# Patient Record
Sex: FEMALE | Race: WHITE | Employment: OTHER | URBAN - METROPOLITAN AREA
[De-identification: names, ages, dates, MRNs, and addresses within clinical notes are randomized per-mention and may not be internally consistent; named-entity substitution may affect disease eponyms.]

---

## 2021-02-22 RX ORDER — MULTIVIT-MIN/IRON/FOLIC ACID/K 18-600-40
CAPSULE ORAL
COMMUNITY

## 2021-02-22 RX ORDER — SERTRALINE HYDROCHLORIDE 100 MG/1
100 TABLET, FILM COATED ORAL DAILY
COMMUNITY

## 2021-02-22 RX ORDER — OXYBUTYNIN CHLORIDE 5 MG/1
5 TABLET ORAL NIGHTLY
COMMUNITY

## 2021-02-22 RX ORDER — ALPRAZOLAM 0.25 MG/1
0.25 TABLET ORAL NIGHTLY PRN
COMMUNITY

## 2021-02-22 RX ORDER — ATORVASTATIN CALCIUM 40 MG/1
40 TABLET, FILM COATED ORAL DAILY
COMMUNITY

## 2021-02-22 RX ORDER — BENAZEPRIL HYDROCHLORIDE 40 MG/1
40 TABLET, FILM COATED ORAL DAILY
COMMUNITY

## 2021-02-22 RX ORDER — BUPROPION HYDROCHLORIDE 300 MG/1
300 TABLET ORAL EVERY MORNING
COMMUNITY

## 2021-02-22 RX ORDER — AMITRIPTYLINE HYDROCHLORIDE 25 MG/1
25 TABLET, FILM COATED ORAL NIGHTLY
COMMUNITY

## 2021-02-22 RX ORDER — LAMOTRIGINE 25 MG/1
25 TABLET ORAL 2 TIMES DAILY
COMMUNITY

## 2021-02-22 RX ORDER — LANSOPRAZOLE 30 MG/1
30 CAPSULE, DELAYED RELEASE ORAL DAILY
COMMUNITY

## 2021-02-22 RX ORDER — TOPIRAMATE 25 MG/1
25 TABLET ORAL 2 TIMES DAILY
COMMUNITY

## 2021-02-22 RX ORDER — WARFARIN SODIUM 5 MG/1
5 TABLET ORAL
COMMUNITY

## 2021-02-22 NOTE — PROGRESS NOTES
[address] [facility] time___0720_________        Surgery time____0850________    Take the following medications with a sip of water: Benazepril, Prevacid     Do not eat or drink anything after 12:00 midnight prior to your surgery. This includes water chewing gum, mints and ice chips. You may brush your teeth and gargle the morning of your surgery, but do not swallow the water     Please see your family doctor/pediatrician for a history and physical and/or concerning medications. H&P Dr. Malorie Calero 2/25   Bring any test results/reports from your physicians office. If you are under the care of a heart doctor or specialist doctor, please be aware that you may be asked to them for clearance    You may be asked to stop blood thinners such as Coumadin, Plavix, Fragmin, Lovenox, etc., or any anti-inflammatories such as:  Aspirin, Ibuprofen, Advil, Naproxen prior to your surgery. We also ask that you stop any OTC medications such as fish oil, vitamin E, glucosamine, garlic, Multivitamins, COQ 10, etc.    We ask that you do not smoke 24 hours prior to surgery  We ask that you do not  drink any alcoholic beverages 24 hours prior to surgery     You must make arrangements for a responsible adult to take you home after your surgery. For your safety you will not be allowed to leave alone or drive yourself home. Your surgery will be cancelled if you do not have a ride home. Also for your safety, it is strongly suggested that someone stay with you the first 24 hours after your surgery. A parent or legal guardian must accompany a child scheduled for surgery and plan to stay at the hospital until the child is discharged. Please do not bring other children with you. For your comfort, please wear simple loose fitting clothing to the hospital.  Please do not bring valuables. Wear short sleeve button down shirt or loose fitting shirt. Bring eye drops or ointment. Do not wear any make-up or nail polish on your fingers or toes      For your safety, please do not wear any jewelry or body piercing's on the day of surgery. All jewelry must be removed. If you have dentures, they will be removed before going to operating room. For your convenience, we will provide you with a container. If you wear contact lenses or glasses, they will be removed, please bring a case for them. If you have a living will and a durable power of  for healthcare, please bring in a copy. As part of our patient safety program to minimize surgical site infections, we ask you to do the following:    · Please notify your surgeon if you develop any illness between         now and the  day of your surgery. · This includes a cough, cold, fever, sore throat, nausea,         or vomiting, and diarrhea, etc.  ·  Please notify your surgeon if you experience dizziness, shortness         of breath or blurred vision between now and the time of your surgery. Do not shave your operative site 96 hours prior to surgery. For face and neck surgery, men may use an electric razor 48 hours   prior to surgery. You may shower the night before surgery or the morning of   your surgery with an antibacterial soap. You will need to bring a photo ID and insurance card    [facility] has an onsite pharmacy, would you like to utilize our pharmacy     If you will be staying overnight and use a C-pap machine, please bring   your C-pap to hospital     Our goal is to provide you with excellent care, therefore, visitors will be limited to two(2) in the room at a time so that we may focus on providing this care for you. Please contact pre-admission testing if you have any further questions. [facility] phone number:  [phone]    Please note these are generalized instructions for all surgical cases, you may be provided with more specific instructions according to your surgery.

## 2021-02-26 ENCOUNTER — ANESTHESIA EVENT (OUTPATIENT)
Dept: SURGERY | Age: 76
End: 2021-02-26
Payer: MEDICARE

## 2021-02-26 NOTE — PROGRESS NOTES
Dr. Clarance Canavan aware of INR 4.5 and PT 42.6 states surgery should be okay as long as patient stops coumadin today. I spoke to the patient she has stopped the coumadin today.

## 2021-02-26 NOTE — PROGRESS NOTES
After reviewing patient's history  states it is okay for patient to have surgery at the surgery center. Dr. Steve Bernal made aware of INR 4.5 and protime 42.6 results.

## 2021-03-01 ENCOUNTER — ANESTHESIA (OUTPATIENT)
Dept: SURGERY | Age: 76
End: 2021-03-01
Payer: MEDICARE

## 2021-03-01 ENCOUNTER — HOSPITAL ENCOUNTER (OUTPATIENT)
Age: 76
Setting detail: OUTPATIENT SURGERY
Discharge: HOME | End: 2021-03-01
Attending: OPHTHALMOLOGY | Admitting: OPHTHALMOLOGY
Payer: MEDICARE

## 2021-03-01 VITALS — DIASTOLIC BLOOD PRESSURE: 64 MMHG | OXYGEN SATURATION: 93 % | SYSTOLIC BLOOD PRESSURE: 112 MMHG

## 2021-03-01 VITALS
WEIGHT: 178 LBS | DIASTOLIC BLOOD PRESSURE: 65 MMHG | SYSTOLIC BLOOD PRESSURE: 137 MMHG | RESPIRATION RATE: 19 BRPM | HEART RATE: 78 BPM | OXYGEN SATURATION: 98 % | TEMPERATURE: 96.4 F | HEIGHT: 67 IN | BODY MASS INDEX: 27.94 KG/M2

## 2021-03-01 LAB
GLUCOSE BLD-MCNC: 109 MG/DL (ref 70–99)
GLUCOSE BLD-MCNC: 128 MG/DL (ref 70–99)
PERFORMED ON: ABNORMAL
PERFORMED ON: ABNORMAL

## 2021-03-01 PROCEDURE — 3700000001 HC ADD 15 MINUTES (ANESTHESIA): Performed by: OPHTHALMOLOGY

## 2021-03-01 PROCEDURE — 2500000003 HC RX 250 WO HCPCS: Performed by: OPHTHALMOLOGY

## 2021-03-01 PROCEDURE — 6370000000 HC RX 637 (ALT 250 FOR IP): Performed by: OPHTHALMOLOGY

## 2021-03-01 PROCEDURE — 3700000000 HC ANESTHESIA ATTENDED CARE: Performed by: OPHTHALMOLOGY

## 2021-03-01 PROCEDURE — 3600000002 HC SURGERY LEVEL 2 BASE: Performed by: OPHTHALMOLOGY

## 2021-03-01 PROCEDURE — 2580000003 HC RX 258: Performed by: ANESTHESIOLOGY

## 2021-03-01 PROCEDURE — 3600000012 HC SURGERY LEVEL 2 ADDTL 15MIN: Performed by: OPHTHALMOLOGY

## 2021-03-01 PROCEDURE — 2709999900 HC NON-CHARGEABLE SUPPLY: Performed by: OPHTHALMOLOGY

## 2021-03-01 PROCEDURE — 6360000002 HC RX W HCPCS: Performed by: NURSE ANESTHETIST, CERTIFIED REGISTERED

## 2021-03-01 PROCEDURE — 7100000010 HC PHASE II RECOVERY - FIRST 15 MIN: Performed by: OPHTHALMOLOGY

## 2021-03-01 RX ORDER — SODIUM CHLORIDE 9 MG/ML
INJECTION, SOLUTION INTRAVENOUS CONTINUOUS
Status: CANCELLED | OUTPATIENT
Start: 2021-03-01

## 2021-03-01 RX ORDER — PROPOFOL 10 MG/ML
INJECTION, EMULSION INTRAVENOUS PRN
Status: DISCONTINUED | OUTPATIENT
Start: 2021-03-01 | End: 2021-03-01 | Stop reason: SDUPTHER

## 2021-03-01 RX ORDER — SODIUM CHLORIDE 0.9 % (FLUSH) 0.9 %
10 SYRINGE (ML) INJECTION EVERY 12 HOURS SCHEDULED
Status: CANCELLED | OUTPATIENT
Start: 2021-03-01

## 2021-03-01 RX ORDER — MIDAZOLAM HYDROCHLORIDE 1 MG/ML
INJECTION INTRAMUSCULAR; INTRAVENOUS PRN
Status: DISCONTINUED | OUTPATIENT
Start: 2021-03-01 | End: 2021-03-01 | Stop reason: SDUPTHER

## 2021-03-01 RX ORDER — SODIUM CHLORIDE 9 MG/ML
INJECTION, SOLUTION INTRAVENOUS CONTINUOUS
Status: DISCONTINUED | OUTPATIENT
Start: 2021-03-01 | End: 2021-03-01 | Stop reason: HOSPADM

## 2021-03-01 RX ORDER — SODIUM CHLORIDE 0.9 % (FLUSH) 0.9 %
10 SYRINGE (ML) INJECTION EVERY 12 HOURS SCHEDULED
Status: DISCONTINUED | OUTPATIENT
Start: 2021-03-01 | End: 2021-03-01 | Stop reason: HOSPADM

## 2021-03-01 RX ORDER — SODIUM CHLORIDE 0.9 % (FLUSH) 0.9 %
10 SYRINGE (ML) INJECTION PRN
Status: CANCELLED | OUTPATIENT
Start: 2021-03-01

## 2021-03-01 RX ORDER — TETRACAINE HYDROCHLORIDE 5 MG/ML
SOLUTION OPHTHALMIC
Status: COMPLETED | OUTPATIENT
Start: 2021-03-01 | End: 2021-03-01

## 2021-03-01 RX ORDER — SODIUM CHLORIDE 0.9 % (FLUSH) 0.9 %
10 SYRINGE (ML) INJECTION PRN
Status: DISCONTINUED | OUTPATIENT
Start: 2021-03-01 | End: 2021-03-01 | Stop reason: HOSPADM

## 2021-03-01 RX ADMIN — SODIUM CHLORIDE: 9 INJECTION, SOLUTION INTRAVENOUS at 07:48

## 2021-03-01 RX ADMIN — PROPOFOL 60 MG: 10 INJECTION, EMULSION INTRAVENOUS at 08:50

## 2021-03-01 RX ADMIN — PROPOFOL 20 MG: 10 INJECTION, EMULSION INTRAVENOUS at 09:11

## 2021-03-01 RX ADMIN — MIDAZOLAM 1 MG: 1 INJECTION INTRAMUSCULAR; INTRAVENOUS at 08:46

## 2021-03-01 RX ADMIN — PROPOFOL 20 MG: 10 INJECTION, EMULSION INTRAVENOUS at 09:06

## 2021-03-01 RX ADMIN — PROPOFOL 20 MG: 10 INJECTION, EMULSION INTRAVENOUS at 08:52

## 2021-03-01 RX ADMIN — PROPOFOL 40 MG: 10 INJECTION, EMULSION INTRAVENOUS at 09:01

## 2021-03-01 RX ADMIN — MIDAZOLAM 1 MG: 1 INJECTION INTRAMUSCULAR; INTRAVENOUS at 08:53

## 2021-03-01 ASSESSMENT — PAIN SCALES - GENERAL: PAINLEVEL_OUTOF10: 0

## 2021-03-01 ASSESSMENT — LIFESTYLE VARIABLES: SMOKING_STATUS: 0

## 2021-03-01 NOTE — ANESTHESIA POSTPROCEDURE EVALUATION
Department of Anesthesiology  Postprocedure Note    Patient: Mark John  MRN: [id number]  [place]: 1945  Date of evaluation: 3/1/2021  Time:  10:49 AM     Procedure Summary     Date: 03/01/21 Room / Location: [facility]    Anesthesia Start: 7211 Anesthesia Stop: 6622    Procedure: BILATERAL UPPER LID BLEPHAROPLASTY (Bilateral Eye) Diagnosis:       Dermatochalasis of both upper eyelids      (Dermatochalasis of both upper eyelids)    Surgeons: Ricky Dorado MD Responsible Provider: Jody Matt MD    Anesthesia Type: MAC ASA Status: 3          Anesthesia Type: MAC    Gretchen Phase I: Gretchen Score: 10    Gretchen Phase II: Gretchen Score: 10    Last vitals: Reviewed and per EMR flowsheets.        Anesthesia Post Evaluation    Patient location during evaluation: PACU  Patient participation: complete - patient participated  Level of consciousness: awake and alert  Pain score: 0  Airway patency: patent  Nausea & Vomiting: no nausea and no vomiting  Complications: no  Cardiovascular status: blood pressure returned to baseline  Respiratory status: acceptable  Hydration status: euvolemic

## 2021-03-01 NOTE — ANESTHESIA PRE PROCEDURE
[facility] Department of Anesthesiology  Pre-Anesthesia Evaluation/Consultation       Name:  Paula Lacey  :   Age:  77 y.o. MRN:  [id number]  Date: 3/1/2021           Surgeon: Surgeon(s):  Dot Red MD    Procedure: Procedure(s):  BILATERAL UPPER LID BLEPHAROPLASTY     Allergies   Allergen Reactions    Cymbalta [Duloxetine Hcl] Nausea Only    Escitalopram      Acts goofy    Fioricet [Butalbital-Apap-Caffeine]     Fluoxetine     Hydrochlorothiazide      sweat    Hydrocodone      goofy    Iron      Upset stomach    Lyrica [Pregabalin]      goofy    Metoprolol Nausea Only    Tramadol      Confusion     Valacyclovir      fever    Wasp Venom Swelling    Abilify [Aripiprazole] Nausea And Vomiting    Neosporin Original [Bacitracin-Neomycin-Polymyxin] Rash     There is no problem list on file for this patient. Past Medical History:   Diagnosis Date    Bell's palsy     CKD (chronic kidney disease)     Claustrophobia     Diabetes mellitus (HCC)     Esophageal reflux     Hx of blood clots     X 14    Hyperlipidemia     Hypertension     Kidney stone     Nervous     Pulmonary embolism ([facility] [address]     Stuttering      Past Surgical History:   Procedure Laterality Date    APPENDECTOMY      CARDIAC CATHETERIZATION      CHOLECYSTECTOMY      COLONOSCOPY      HYSTERECTOMY      KNEE SURGERY Right     TONSILLECTOMY       Social History     Tobacco Use    Smoking status: Former Smoker    Smokeless tobacco: Never Used    Tobacco comment: 10 yrs ago quit   Substance Use Topics    Alcohol use: Yes    Drug use: Never     Medications  No current facility-administered medications on file prior to encounter. Current Outpatient Medications on File Prior to Encounter   Medication Sig Dispense Refill    ALPRAZolam (XANAX) 0.25 MG tablet Take 0.25 mg by mouth nightly as needed for Sleep.  buPROPion (WELLBUTRIN XL) 300 MG extended release tablet Take 300 mg by mouth every morning      lamoTRIgine (LAMICTAL) 25 MG tablet Take 25 mg by mouth 2 times daily      sertraline (ZOLOFT) 100 MG tablet Take 100 mg by mouth daily      atorvastatin (LIPITOR) 40 MG tablet Take 40 mg by mouth daily      lansoprazole (PREVACID) 30 MG delayed release capsule Take 30 mg by mouth daily      amitriptyline (ELAVIL) 25 MG tablet Take 25 mg by mouth nightly Indications: 2 tablets at bedtime      topiramate (TOPAMAX) 25 MG tablet Take 25 mg by mouth 2 times daily      warfarin (COUMADIN) 5 MG tablet Take 5 mg by mouth Indications: 1.t tablets  5 days a week 2 tablets 2 days a week per INR      benazepril (LOTENSIN) 40 MG tablet Take 40 mg by mouth daily      oxybutynin (DITROPAN) 5 MG tablet Take 5 mg by mouth nightly      Cholecalciferol (VITAMIN D) 50 MCG ( UT) CAPS capsule Take by mouth       Current Facility-Administered Medications   Medication Dose Route Frequency Provider Last Rate Last Admin    0.9 % sodium chloride infusion   Intravenous Continuous Gerardo Pacheco MD        sodium chloride flush 0.9 % injection 10 mL  10 mL Intravenous 2 times per day Gerardo Pacheco MD        sodium chloride flush 0.9 % injection 10 mL  10 mL Intravenous PRN Gerardo Pacheco MD         Vital Signs (Current)   Vitals:    21 1528 21   BP:  (!) 150/68   Pulse:  80   Resp:  17   Temp:  98.3 °F (36.8 °C)   TempSrc:  Temporal   SpO2:  97%   Weight: 178 lb (80.7 kg) 178 lb (80.7 kg)   Height: 5' 7\" (1.702 m) 5' 7\" (1.702 m)                                          BP Readings from Last 3 Encounters:   21 (!) 150/68     Vital Signs Statistics (for past 48 hrs)     Temp  Av.3 °F (36.8 °C)  Min: 98.3 °F (36.8 °C)   Min taken time: 21  Max: 98.3 °F (36.8 °C)   Max taken time: 21  Pulse  Av  Min: 80   Min taken time: 21  Max: 80   Max taken time: 21 0078 Resp  Av  Min: 16   Min taken time: 21  Max: 16   Max taken time: 21  BP  Min: 150/68   Min taken time: 21  Max: 150/68   Max taken time: 21  SpO2  Av %  Min: 97 %   Min taken time: 21  Max: 97 %   Max taken time: 21  BP Readings from Last 3 Encounters:   21 (!) 150/68       BMI  Body mass index is 27.88 kg/m². Estimated body mass index is 27.88 kg/m² as calculated from the following:    Height as of this encounter: 5' 7\" (1.702 m). Weight as of this encounter: 178 lb (80.7 kg). CBC   Lab Results   Component Value Date    WBC 9.5 2010    RBC 4.75 2010    HGB 14.4 2010    HCT 43.0 2010    MCV 90.4 2010    RDW 14.2 2010     2010     CMP    Lab Results   Component Value Date     2010    K 4.3 2010     2010    CO2 29 2010    BUN 18 2010    CREATININE 0.9 2010    GFRAA >60 2010    AGRATIO 1.6 2010    GLUCOSE 114 2010    PROT 7.1 2010    CALCIUM 9.5 2010    BILITOT 0.50 2010    ALKPHOS 120 2010    AST 29 2010    ALT 39 2010     BMP    Lab Results   Component Value Date     2010    K 4.3 2010     2010    CO2 29 2010    BUN 18 2010    CREATININE 0.9 2010    CALCIUM 9.5 2010    GFRAA >60 2010    GLUCOSE 114 2010     POCGlucose  No results for input(s): GLUCOSE in the last 72 hours.    Coags    Lab Results   Component Value Date    PROTIME 21.7 2011    INR 1.94 2011    APTT 33.0      HCG (If Applicable) No results found for: PREGTESTUR, PREGSERUM, HCG, HCGQUANT   ABGs No results found for: PHART, PO2ART, DDC1KIO, LHQ1DYM, BEART, W0IWTSLP   Type & Screen (If Applicable)  No results found for: LABABO, LABRH                         BMI: Wt Readings from Last 3 Encounters:       NPO Status:  >8h Anesthesia Evaluation  Patient summary reviewed no history of anesthetic complications:   Airway: Mallampati: II  TM distance: >3 FB   Neck ROM: full  Mouth opening: > = 3 FB Dental:    (+) upper dentures      Pulmonary: breath sounds clear to auscultation      (-) COPD, asthma and not a current smoker                           Cardiovascular:  Exercise tolerance: good (>4 METS),   (+) hypertension:, hyperlipidemia    (-) past MI, CABG/stent and  angina        Rate: normal                    Neuro/Psych:   (+) neuromuscular disease:,    (-) seizures and psychiatric history           GI/Hepatic/Renal:   (+) GERD:, renal disease: CRI,      (-) liver disease and no morbid obesity       Endo/Other:    (+) DiabetesType II DM, , blood dyscrasia::., .    (-) hypothyroidism               Abdominal:           Vascular:   + DVT, PE.  - PVD. Anesthesia Plan      MAC     ASA 3     (Stopped coumadin 3 days ago per surgeon guidelines)  Induction: intravenous. Anesthetic plan and risks discussed with patient. Plan discussed with CRNA. This pre-anesthesia assessment may be used as a history and physical.    DOS STAFF ADDENDUM:    Pt seen and examined, chart reviewed (including anesthesia, drug and allergy history). No interval changes to history and physical examination. Anesthetic plan, risks, benefits, alternatives, and personnel involved discussed with patient. Patient verbalized an understanding and agrees to proceed.       Ludy Watson MD  March 1, 2021  7:43 AM

## 2021-03-01 NOTE — OP NOTE
Title of Operation:  Bilateral upper lid functional blepharoplasty, CPT code 93783-27  Indications for Surgery:  77year-old female who presented visually significant bilateral upper lid dermatochalasis, for which medically necessary blepharoplasty was indicated. After benefits, risks and alternatives were discussed, the patient elected to proceed with surgical repair. Preoperative Diagnosis:  Bilateral upper lid dermatochalasis  Postoperative Diagnosis:  Bilateral upper lid dermatochalasis  Anesthesia:   Monitored anesthesia care (MAC) and Local.  Specimen (Bacteriological, Pathological or other):  None  Prosthetic Device/Implant:  None  Surgeon:  Barbara Carrillo MD  Assistant:  None  Estimated blood loss:  Less than 5mL  Surgeons Narrative: The patient was greeted in the preoperative area. The correct place for surgery was identified and marked. The patient was taken back to the operating room and placed in supine position. Time-out for safety was held. The upper eyelid crease and an ellipse of upper lid skin were measured and marked on each side with a marking pen. Intravenous sedation was administered. A 50:50 mix of 2% lidocaine with 1:100,000 epinephrine and 0.75% marcaine was injected in these areas for local anesthesia. The patient was then prepped and draped in the usual sterile fashion. The right upper lid was addressed first. The skin was incised with a #15 blade. The strip of excess skin was removed with Winter scissors. Careful hemostasis was achieved with bipolar cautery. The orbital septum was incised, the preaponeurotic fat pads were identified and trimmed as needed, with a clamp/cut/cautery technique. The skin was then closed with a running 6-0 Prolene suture. The exact same procedure was performed in the left upper lid. Antibiotic ointment was applied in the eyes and on the incision sites. The patient tolerated the procedure very well. There were no complications.

## 2025-06-09 ENCOUNTER — APPOINTMENT (OUTPATIENT)
Dept: CT IMAGING | Age: 80
DRG: 064 | End: 2025-06-09
Payer: MEDICARE

## 2025-06-09 ENCOUNTER — APPOINTMENT (OUTPATIENT)
Dept: GENERAL RADIOLOGY | Age: 80
DRG: 064 | End: 2025-06-09
Payer: MEDICARE

## 2025-06-09 ENCOUNTER — HOSPITAL ENCOUNTER (INPATIENT)
Age: 80
LOS: 9 days | Discharge: PSYCHIATRIC OR SUBSTANCE USE FACILITY | DRG: 064 | End: 2025-06-18
Attending: STUDENT IN AN ORGANIZED HEALTH CARE EDUCATION/TRAINING PROGRAM | Admitting: INTERNAL MEDICINE
Payer: MEDICARE

## 2025-06-09 DIAGNOSIS — R41.82 ALTERED MENTAL STATUS, UNSPECIFIED ALTERED MENTAL STATUS TYPE: ICD-10-CM

## 2025-06-09 DIAGNOSIS — I63.9 CEREBROVASCULAR ACCIDENT (CVA), UNSPECIFIED MECHANISM (HCC): Primary | ICD-10-CM

## 2025-06-09 DIAGNOSIS — R29.90 STROKE-LIKE SYMPTOMS: ICD-10-CM

## 2025-06-09 LAB
ALBUMIN SERPL-MCNC: 4.3 G/DL (ref 3.4–5)
ALBUMIN/GLOB SERPL: 1.7 {RATIO} (ref 1.1–2.2)
ALP SERPL-CCNC: 112 U/L (ref 40–129)
ALT SERPL-CCNC: 15 U/L (ref 10–40)
ANION GAP SERPL CALCULATED.3IONS-SCNC: 11 MMOL/L (ref 3–16)
AST SERPL-CCNC: 20 U/L (ref 15–37)
BACTERIA URNS QL MICRO: NORMAL /HPF
BASOPHILS # BLD: 0.1 K/UL (ref 0–0.2)
BASOPHILS NFR BLD: 0.8 %
BILIRUB SERPL-MCNC: 0.7 MG/DL (ref 0–1)
BILIRUB UR QL STRIP.AUTO: NEGATIVE
BUN SERPL-MCNC: 14 MG/DL (ref 7–20)
CALCIUM SERPL-MCNC: 9.6 MG/DL (ref 8.3–10.6)
CHLORIDE SERPL-SCNC: 105 MMOL/L (ref 99–110)
CHP ED QC CHECK: 111
CLARITY UR: CLEAR
CO2 SERPL-SCNC: 26 MMOL/L (ref 21–32)
COLOR UR: YELLOW
CREAT SERPL-MCNC: 0.9 MG/DL (ref 0.6–1.2)
DEPRECATED RDW RBC AUTO: 13.5 % (ref 12.4–15.4)
EKG ATRIAL RATE: 87 BPM
EKG DIAGNOSIS: NORMAL
EKG P AXIS: 56 DEGREES
EKG P-R INTERVAL: 186 MS
EKG Q-T INTERVAL: 412 MS
EKG QRS DURATION: 136 MS
EKG QTC CALCULATION (BAZETT): 495 MS
EKG R AXIS: -60 DEGREES
EKG T AXIS: 19 DEGREES
EKG VENTRICULAR RATE: 87 BPM
EOSINOPHIL # BLD: 0 K/UL (ref 0–0.6)
EOSINOPHIL NFR BLD: 0 %
EPI CELLS #/AREA URNS AUTO: 1 /HPF (ref 0–5)
GFR SERPLBLD CREATININE-BSD FMLA CKD-EPI: 65 ML/MIN/{1.73_M2}
GLUCOSE BLD-MCNC: 111 MG/DL (ref 70–99)
GLUCOSE SERPL-MCNC: 113 MG/DL (ref 70–99)
GLUCOSE UR STRIP.AUTO-MCNC: NEGATIVE MG/DL
HCT VFR BLD AUTO: 45.4 % (ref 36–48)
HGB BLD-MCNC: 15.5 G/DL (ref 12–16)
HGB UR QL STRIP.AUTO: NEGATIVE
HYALINE CASTS #/AREA URNS AUTO: 0 /LPF (ref 0–8)
INR PPP: 0.92 (ref 0.85–1.15)
KETONES UR STRIP.AUTO-MCNC: NEGATIVE MG/DL
LEUKOCYTE ESTERASE UR QL STRIP.AUTO: ABNORMAL
LYMPHOCYTES # BLD: 1.3 K/UL (ref 1–5.1)
LYMPHOCYTES NFR BLD: 17 %
MCH RBC QN AUTO: 31.2 PG (ref 26–34)
MCHC RBC AUTO-ENTMCNC: 34.1 G/DL (ref 31–36)
MCV RBC AUTO: 91.4 FL (ref 80–100)
MONOCYTES # BLD: 0.4 K/UL (ref 0–1.3)
MONOCYTES NFR BLD: 5.6 %
NEUTROPHILS # BLD: 5.9 K/UL (ref 1.7–7.7)
NEUTROPHILS NFR BLD: 76.6 %
NITRITE UR QL STRIP.AUTO: NEGATIVE
PERFORMED ON: ABNORMAL
PH UR STRIP.AUTO: 8 [PH] (ref 5–8)
PLATELET # BLD AUTO: 203 K/UL (ref 135–450)
PMV BLD AUTO: 7.9 FL (ref 5–10.5)
POTASSIUM SERPL-SCNC: 3.9 MMOL/L (ref 3.5–5.1)
PROT SERPL-MCNC: 6.8 G/DL (ref 6.4–8.2)
PROT UR STRIP.AUTO-MCNC: NEGATIVE MG/DL
PROTHROMBIN TIME: 12.6 SEC (ref 11.9–14.9)
RBC # BLD AUTO: 4.97 M/UL (ref 4–5.2)
RBC CLUMPS #/AREA URNS AUTO: 0 /HPF (ref 0–4)
SODIUM SERPL-SCNC: 142 MMOL/L (ref 136–145)
SP GR UR STRIP.AUTO: 1.02 (ref 1–1.03)
TROPONIN, HIGH SENSITIVITY: 11 NG/L (ref 0–14)
UA COMPLETE W REFLEX CULTURE PNL UR: ABNORMAL
UA DIPSTICK W REFLEX MICRO PNL UR: YES
URN SPEC COLLECT METH UR: ABNORMAL
UROBILINOGEN UR STRIP-ACNC: 0.2 E.U./DL
WBC # BLD AUTO: 7.7 K/UL (ref 4–11)
WBC #/AREA URNS AUTO: 5 /HPF (ref 0–5)

## 2025-06-09 PROCEDURE — 70450 CT HEAD/BRAIN W/O DYE: CPT

## 2025-06-09 PROCEDURE — 2000000000 HC ICU R&B

## 2025-06-09 PROCEDURE — 6360000004 HC RX CONTRAST MEDICATION: Performed by: STUDENT IN AN ORGANIZED HEALTH CARE EDUCATION/TRAINING PROGRAM

## 2025-06-09 PROCEDURE — 84484 ASSAY OF TROPONIN QUANT: CPT

## 2025-06-09 PROCEDURE — 94761 N-INVAS EAR/PLS OXIMETRY MLT: CPT

## 2025-06-09 PROCEDURE — 81001 URINALYSIS AUTO W/SCOPE: CPT

## 2025-06-09 PROCEDURE — 6360000004 HC RX CONTRAST MEDICATION: Performed by: PSYCHIATRY & NEUROLOGY

## 2025-06-09 PROCEDURE — 71045 X-RAY EXAM CHEST 1 VIEW: CPT

## 2025-06-09 PROCEDURE — 70496 CT ANGIOGRAPHY HEAD: CPT

## 2025-06-09 PROCEDURE — 93010 ELECTROCARDIOGRAM REPORT: CPT | Performed by: STUDENT IN AN ORGANIZED HEALTH CARE EDUCATION/TRAINING PROGRAM

## 2025-06-09 PROCEDURE — 80053 COMPREHEN METABOLIC PANEL: CPT

## 2025-06-09 PROCEDURE — 99285 EMERGENCY DEPT VISIT HI MDM: CPT

## 2025-06-09 PROCEDURE — 6370000000 HC RX 637 (ALT 250 FOR IP): Performed by: STUDENT IN AN ORGANIZED HEALTH CARE EDUCATION/TRAINING PROGRAM

## 2025-06-09 PROCEDURE — 6360000002 HC RX W HCPCS: Performed by: INTERNAL MEDICINE

## 2025-06-09 PROCEDURE — 70498 CT ANGIOGRAPHY NECK: CPT

## 2025-06-09 PROCEDURE — 6370000000 HC RX 637 (ALT 250 FOR IP): Performed by: INTERNAL MEDICINE

## 2025-06-09 PROCEDURE — 74018 RADEX ABDOMEN 1 VIEW: CPT

## 2025-06-09 PROCEDURE — 93005 ELECTROCARDIOGRAM TRACING: CPT | Performed by: STUDENT IN AN ORGANIZED HEALTH CARE EDUCATION/TRAINING PROGRAM

## 2025-06-09 PROCEDURE — 85025 COMPLETE CBC W/AUTO DIFF WBC: CPT

## 2025-06-09 PROCEDURE — 94760 N-INVAS EAR/PLS OXIMETRY 1: CPT

## 2025-06-09 PROCEDURE — 85610 PROTHROMBIN TIME: CPT

## 2025-06-09 RX ORDER — POLYETHYLENE GLYCOL 3350 17 G/17G
17 POWDER, FOR SOLUTION ORAL DAILY PRN
Status: DISCONTINUED | OUTPATIENT
Start: 2025-06-09 | End: 2025-06-18 | Stop reason: HOSPADM

## 2025-06-09 RX ORDER — ENOXAPARIN SODIUM 100 MG/ML
40 INJECTION SUBCUTANEOUS NIGHTLY
Status: DISCONTINUED | OUTPATIENT
Start: 2025-06-09 | End: 2025-06-18 | Stop reason: HOSPADM

## 2025-06-09 RX ORDER — OMEPRAZOLE 40 MG/1
40 CAPSULE, DELAYED RELEASE ORAL DAILY
COMMUNITY

## 2025-06-09 RX ORDER — IOPAMIDOL 755 MG/ML
75 INJECTION, SOLUTION INTRAVASCULAR
Status: COMPLETED | OUTPATIENT
Start: 2025-06-09 | End: 2025-06-09

## 2025-06-09 RX ORDER — ONDANSETRON 2 MG/ML
4 INJECTION INTRAMUSCULAR; INTRAVENOUS EVERY 6 HOURS PRN
Status: DISCONTINUED | OUTPATIENT
Start: 2025-06-09 | End: 2025-06-18 | Stop reason: HOSPADM

## 2025-06-09 RX ORDER — SODIUM CHLORIDE 9 MG/ML
INJECTION, SOLUTION INTRAVENOUS PRN
Status: DISCONTINUED | OUTPATIENT
Start: 2025-06-09 | End: 2025-06-18 | Stop reason: HOSPADM

## 2025-06-09 RX ORDER — ASPIRIN 81 MG/1
324 TABLET, CHEWABLE ORAL ONCE
Status: COMPLETED | OUTPATIENT
Start: 2025-06-09 | End: 2025-06-09

## 2025-06-09 RX ORDER — SODIUM CHLORIDE 0.9 % (FLUSH) 0.9 %
5-40 SYRINGE (ML) INJECTION EVERY 12 HOURS SCHEDULED
Status: DISCONTINUED | OUTPATIENT
Start: 2025-06-09 | End: 2025-06-18 | Stop reason: HOSPADM

## 2025-06-09 RX ORDER — BACLOFEN 10 MG/1
10 TABLET ORAL NIGHTLY
COMMUNITY

## 2025-06-09 RX ORDER — ONDANSETRON 4 MG/1
4 TABLET, ORALLY DISINTEGRATING ORAL EVERY 8 HOURS PRN
Status: DISCONTINUED | OUTPATIENT
Start: 2025-06-09 | End: 2025-06-18 | Stop reason: HOSPADM

## 2025-06-09 RX ORDER — ATORVASTATIN CALCIUM 80 MG/1
80 TABLET, FILM COATED ORAL NIGHTLY
Status: DISCONTINUED | OUTPATIENT
Start: 2025-06-09 | End: 2025-06-18 | Stop reason: HOSPADM

## 2025-06-09 RX ORDER — ASPIRIN 81 MG/1
81 TABLET, CHEWABLE ORAL DAILY
Status: DISCONTINUED | OUTPATIENT
Start: 2025-06-10 | End: 2025-06-18 | Stop reason: HOSPADM

## 2025-06-09 RX ORDER — SODIUM CHLORIDE 0.9 % (FLUSH) 0.9 %
5-40 SYRINGE (ML) INJECTION PRN
Status: DISCONTINUED | OUTPATIENT
Start: 2025-06-09 | End: 2025-06-18 | Stop reason: HOSPADM

## 2025-06-09 RX ORDER — DICYCLOMINE HYDROCHLORIDE 10 MG/1
10 CAPSULE ORAL 3 TIMES DAILY PRN
Status: ON HOLD | COMMUNITY
End: 2025-06-13 | Stop reason: HOSPADM

## 2025-06-09 RX ORDER — ASPIRIN 300 MG/1
300 SUPPOSITORY RECTAL DAILY
Status: DISCONTINUED | OUTPATIENT
Start: 2025-06-10 | End: 2025-06-18 | Stop reason: HOSPADM

## 2025-06-09 RX ADMIN — IOPAMIDOL 75 ML: 755 INJECTION, SOLUTION INTRAVENOUS at 12:18

## 2025-06-09 RX ADMIN — IOPAMIDOL 75 ML: 755 INJECTION, SOLUTION INTRAVENOUS at 22:15

## 2025-06-09 RX ADMIN — ASPIRIN 324 MG: 81 TABLET, CHEWABLE ORAL at 14:06

## 2025-06-09 RX ADMIN — ATORVASTATIN CALCIUM 80 MG: 80 TABLET, FILM COATED ORAL at 20:14

## 2025-06-09 RX ADMIN — ENOXAPARIN SODIUM 40 MG: 100 INJECTION SUBCUTANEOUS at 20:14

## 2025-06-09 ASSESSMENT — PAIN SCALES - GENERAL
PAINLEVEL_OUTOF10: 0
PAINLEVEL_OUTOF10: 0

## 2025-06-09 ASSESSMENT — PAIN - FUNCTIONAL ASSESSMENT: PAIN_FUNCTIONAL_ASSESSMENT: 0-10

## 2025-06-09 NOTE — ED NOTES
Pt adamant about going home today, does not cite any certain reason for wanting to go home other than \"I'm leaving today\" when admission conversation is brought up. Pt shouts and swats hands at staff when it appear she does not like something. Reports wanting to go home to \"Speedy\", her dog. Visitors at bedside trying to assist in convincing patient what is best for her.     Unclear what all of patients current home medications are but does report stopping all of them about a week ago because she felt she no longer needed them.

## 2025-06-09 NOTE — ED PROVIDER NOTES
[facility]N PROGRESSIVE CARE    CHIEF COMPLAINT  Altered Mental Status (In via EMS from home. Pt did not show up for work as scheduled today, employees went to go check on patient and found her at home in an \"altered\" mental state. Pt arrives with symptoms including garbled speech, right sided facial droop and agitation. )       HISTORY OF PRESENT ILLNESS  Darshana Grace is a 79 y.o. female presenting to the ED for altered mental status.  Patient denies show to work today therefore a welfare check was done.  Patient was found disoriented with inability to follow commands and answer questions appropriately.  Patient has a past medical history of Bell's palsy and stroke with residual lower face droop.  Patient does have dysarthria at baseline however family states her dysarthria is worse compared to her baseline.  Patient denies chest pain or shortness of breath.  Patient is agitated and noncompliant with examination and interview.  Last well-known was potentially sometime yesterday.    - History obtained from: Patient  - Limitations to history: none    I have reviewed the following from the nursing documentation:    Past Medical History:   Diagnosis Date    Arthritis     Bell's palsy     Cerebral artery occlusion with cerebral infarction (HCC)     CKD (chronic kidney disease)     Claustrophobia     Diabetes mellitus (HCC)     Esophageal reflux     Hx of blood clots     X 14    Hyperlipidemia     Hypertension     Kidney stone     Nervous     Pulmonary embolism (HCC)     Stuttering      Past Surgical History:   Procedure Laterality Date    APPENDECTOMY      BLEPHAROPLASTY Bilateral 3/1/2021    BILATERAL UPPER LID BLEPHAROPLASTY performed by Flakita Hull MD at [facility]    CARDIAC CATHETERIZATION      CHOLECYSTECTOMY      COLONOSCOPY      HYSTERECTOMY (CERVIX STATUS UNKNOWN)      KNEE SURGERY Right     TONSILLECTOMY       Family History   Problem Relation Age of Onset    Lung Cancer Mother     High Blood  derangements.  Urinalysis negative for UTI.  CT showed occlusion of left posterior cerebral artery at the level of P1 segment, severe focal stenosis of the right P2 segment, questionable age-indeterminate infarct involving left occipital lobe.  Patient was loaded with 324 mg of aspirin.  I did talk to neurostroke team and at this time patient is not a candidate for thrombectomy or acute intervention with TNK based on clinical presentation and timeframe.  Patient will be admitted to [facility] for further stroke workup.    - Chronic Conditions: none    - Records reviewed: Patient    - I independently interpreted: CT head    - Consults: IP CONSULT TO STROKE TEAM  IP CONSULT TO NEUROLOGY  IP CONSULT TO CASE MANAGEMENT     - Pertinent social determinants: None    - Tests considered/Risk stratification tools: None  -  I discussed the patient/results with: Neurostroke team and hospital team    - Disposition consideration: Inpatient management      Is this patient to be included in the SEP-1 Core Measure?  No Exclusion criteria - the patient is NOT to be included for SEP-1 Core Measure due to: 2+ SIRS criteria are not met    IThor MD, am the primary clinician of record.     During the patient's ED course, the patient was given:  Medications   sodium chloride flush 0.9 % injection 5-40 mL (has no administration in time range)   sodium chloride flush 0.9 % injection 5-40 mL (has no administration in time range)   0.9 % sodium chloride infusion (has no administration in time range)   ondansetron (ZOFRAN-ODT) disintegrating tablet 4 mg (has no administration in time range)     Or   ondansetron (ZOFRAN) injection 4 mg (has no administration in time range)   polyethylene glycol (GLYCOLAX) packet 17 g (has no administration in time range)   enoxaparin (LOVENOX) injection 40 mg (has no administration in time range)   atorvastatin (LIPITOR) tablet 80 mg (has no administration in time range)   aspirin chewable tablet

## 2025-06-09 NOTE — H&P
V2.0  History and Physical      Name:  Darshana Grace /Age/Sex: 1945  (79 y.o. female)   MRN & CSN:  [id number] & [id number] Encounter Date/Time: 2025 2:53 PM EDT   Location:  015/A-15 PCP: Eugenia Lamb MD       Hospital Day: 1    Assessment and Plan:   Darshana Grace is a 79 y.o. female with a pmh of CVA, bells palsy, dysarthria brought to hospital with strokelike symptoms.     Hospital Problems           Last Modified POA    * (Principal) Stroke-like symptoms 2025 Yes       Plan:  Strokelike symptoms, patient presenting with confusion, dysarthria, agitation.  CT head with age-indeterminate infarct involving left occipital lobe.  Continue aspirin, statin.  Consult neurology.  MRI brain.  PT, OT, speech swallow therapy.  History of Bell's palsy and prior stroke  Depression, resume home medications  GERD, resume PPI  Hyperlipidemia, resume Lipitor  Hypertension, resume blood pressure medications gradually  Overactive bladder, resume oxybutynin  History of DVT, continue Coumadin      Disposition:   Current Living situation: From home  Expected Disposition: To be Determined  Estimated D/C: To be determined    Diet Diet NPO   DVT Prophylaxis [x] Lovenox, []  Heparin, [] SCDs, [] Ambulation,  [] Eliquis, [] Xarelto, [] Coumadin   Code Status Full Code         Personally reviewed Lab Studies and Imaging     History from:     patient, friends at bedside    History of Present Illness:     Chief Complaint: Strokelike symptoms  Darshana Grace is a 79 y.o. female with a pmh of CVA, bells palsy, dysarthria brought to hospital with strokelike symptoms.  Patient apparently did not show to the OR today, coworkers checked on her at home and he found to be confused, noted worsening speech compared to baseline and agitation.  Brought to hospital for further care. In ER blood pressure noted to be 178/83, workup in the hospital included CT head without contrast and CTA head and neck, showed  occlusion of the left posterior cerebral artery at the level of the P1 segment, severe focal stenosis of the right P2 segment otherwise no significant stenosis.  Questionable age-indeterminate infarct involving the left occipital lobe noted.  Patient in the early noted to be resistant to admission and detailed workup but finally agreed to be admitted.  ED physician discussed with neurostroke team, not a candidate for thrombectomy based on clinical presentation and onset of the symptoms.  Patient to be admitted for further care and further CVA workup.     Review of Systems:        Pertinent positives and negatives discussed in HPI     Objective:   No intake or output data in the 24 hours ending 06/09/25 1453   Vitals:   Vitals:    06/09/25 1250 06/09/25 1325 06/09/25 1359   BP: (!) 169/94 (!) 178/83    Pulse: 87 83 89   Resp: 17 16 13   Temp: 98.3 °F (36.8 °C)     TempSrc: Oral     SpO2: 100% 99% 100%   Weight: 72 kg (158 lb 11.7 oz)         Personally Reviewed Medications Prior to Admission     Prior to Admission medications    Medication Sig Start Date End Date Taking? Authorizing Provider   baclofen (LIORESAL) 10 MG tablet Take 1 tablet by mouth at bedtime   Yes Ronald Landaverde MD   dicyclomine (BENTYL) 10 MG capsule Take 1 capsule by mouth 3 times daily as needed (spasms)   Yes Ronald Landaverde MD   omeprazole (PRILOSEC) 40 MG delayed release capsule Take 1 capsule by mouth daily  Patient not taking: Reported on 6/9/2025    Ronald Landaverde MD   lamoTRIgine (LAMICTAL) 25 MG tablet Take 25 mg by mouth 2 times daily  Patient not taking: Reported on 6/9/2025    Ronald Landaverde MD   sertraline (ZOLOFT) 50 MG tablet Take 1 tablet by mouth daily  Patient not taking: Reported on 6/9/2025    Ronald Landaverde MD   atorvastatin (LIPITOR) 20 MG tablet Take 1 tablet by mouth daily  Patient not taking: Reported on 6/9/2025    Ronald Landaverde MD   benazepril (LOTENSIN) 40 MG tablet Take 40 mg

## 2025-06-09 NOTE — PROGRESS NOTES
Medication Reconciliation    List of medications patient is currently taking is complete.     Source of information: 1. Conversation with patient and family at bedside                                      2. EPIC records      Allergies  Cymbalta [duloxetine hcl], Escitalopram, Fioricet [butalbital-apap-caffeine], Fluoxetine, Hydrochlorothiazide, Hydrocodone, Iron, Lyrica [pregabalin], Metoprolol, Tramadol, Valacyclovir, Wasp venom, Abilify [aripiprazole], and Neosporin original [bacitracin-neomycin-polymyxin]     Notes regarding home medications:   1. Patient did not receive any of her home medications prior to arrival to the emergency department this morning.  2. Patient medication list updated per patients most recent PCP visit and medication fills.   Contacted pharmacy - most recent prescription fills were in January.   3. Patient states she does not wish to take any medications and has not been taking her home meds.     Erna Guy, Pharmacy Intern  6/9/2025 2:03 PM

## 2025-06-09 NOTE — ED NOTES
ED TO INPATIENT SBAR HANDOFF    Patient Name: Darshana Grace   Preferred Name: Darshana  : 1945  79 y.o.   Family/Caregiver Present: no   Code Status Order: Full Code  PO Status: NPO:No  Telemetry Order: No  C-SSRS: Risk of Suicide: No Risk  Sitter no   Restraints:     Sepsis Risk Score      Situation  Chief Complaint   Patient presents with    Altered Mental Status     In via EMS from home. Pt did not show up for work as scheduled today, employees went to go check on patient and found her at home in an \"altered\" mental state. Pt arrives with symptoms including garbled speech, right sided facial droop and agitation.      Brief Description of Patient's Condition: Speech is difficult to understand at baseline. NIH 5, difficult to distinguish new vs old symptoms. Pt admits speech is worse today as far as dysarthria. Lives home alone. Has 2 medical POA's. Pt is oriented x4. Wants to go home.   Mental Status: oriented, alert, coherent, and able to concentrate and follow conversation  Arrived from:Home  Imaging: SEE CHART, RESULTS NOT POPULATING IN THIS NOTE    Abnormal labs:   Abnormal Labs Reviewed   COMPREHENSIVE METABOLIC PANEL W/ REFLEX TO MG FOR LOW K - Abnormal; Notable for the following components:       Result Value    Glucose 113 (*)     All other components within normal limits   POCT GLUCOSE - Abnormal; Notable for the following components:    POC Glucose 111 (*)     All other components within normal limits       Background  Allergies:   Allergies   Allergen Reactions    Cymbalta [Duloxetine Hcl] Nausea Only    Escitalopram      Acts goofy    Fioricet [Butalbital-Apap-Caffeine]     Fluoxetine     Hydrochlorothiazide      sweat    Hydrocodone      goofy    Iron      Upset stomach    Lyrica [Pregabalin]      goofy    Metoprolol Nausea Only    Tramadol      Confusion     Valacyclovir      fever    Wasp Venom Swelling    Abilify [Aripiprazole] Nausea And Vomiting    Neosporin Original

## 2025-06-09 NOTE — ED NOTES
Pre hospital report includes pt not showing up for work as scheduled this morning, coworker went to check on her and reports pt was \"startled\" and not acting right. Specifics unclear. Coworker called EMS. Pt arrives in excited state, does not want anything done, refusing all forms of care. MD in to see patient, patient's power of attorneys present at bedside to diffuse patient's anxiety with some success. Due to inconsistent story and other unclear symptom onset, stroke alert called. EMS did report last known well of 0900 today but unsure how reliable this information is at this time. Pt is alert, oriented, moving all extremities purposefully. Taken to CT as code stroke.

## 2025-06-10 ENCOUNTER — APPOINTMENT (OUTPATIENT)
Dept: MRI IMAGING | Age: 80
DRG: 064 | End: 2025-06-10
Payer: MEDICARE

## 2025-06-10 LAB
ANION GAP SERPL CALCULATED.3IONS-SCNC: 11 MMOL/L (ref 3–16)
BUN SERPL-MCNC: 17 MG/DL (ref 7–20)
CALCIUM SERPL-MCNC: 9.5 MG/DL (ref 8.3–10.6)
CHLORIDE SERPL-SCNC: 106 MMOL/L (ref 99–110)
CHOLEST SERPL-MCNC: 199 MG/DL (ref 0–199)
CO2 SERPL-SCNC: 24 MMOL/L (ref 21–32)
CREAT SERPL-MCNC: 0.9 MG/DL (ref 0.6–1.2)
DEPRECATED RDW RBC AUTO: 13.6 % (ref 12.4–15.4)
EST. AVERAGE GLUCOSE BLD GHB EST-MCNC: 102.5 MG/DL
GFR SERPLBLD CREATININE-BSD FMLA CKD-EPI: 65 ML/MIN/{1.73_M2}
GLUCOSE BLD-MCNC: 101 MG/DL (ref 70–99)
GLUCOSE SERPL-MCNC: 118 MG/DL (ref 70–99)
HBA1C MFR BLD: 5.2 %
HCT VFR BLD AUTO: 46.1 % (ref 36–48)
HDLC SERPL-MCNC: 72 MG/DL (ref 40–60)
HGB BLD-MCNC: 15.5 G/DL (ref 12–16)
LDLC SERPL CALC-MCNC: 108 MG/DL
MCH RBC QN AUTO: 31.4 PG (ref 26–34)
MCHC RBC AUTO-ENTMCNC: 33.7 G/DL (ref 31–36)
MCV RBC AUTO: 93 FL (ref 80–100)
PERFORMED ON: ABNORMAL
PLATELET # BLD AUTO: 216 K/UL (ref 135–450)
PMV BLD AUTO: 8.3 FL (ref 5–10.5)
POTASSIUM SERPL-SCNC: 3.8 MMOL/L (ref 3.5–5.1)
RBC # BLD AUTO: 4.95 M/UL (ref 4–5.2)
SODIUM SERPL-SCNC: 141 MMOL/L (ref 136–145)
TRIGL SERPL-MCNC: 94 MG/DL (ref 0–150)
VLDLC SERPL CALC-MCNC: 19 MG/DL
WBC # BLD AUTO: 7.5 K/UL (ref 4–11)

## 2025-06-10 PROCEDURE — 70551 MRI BRAIN STEM W/O DYE: CPT

## 2025-06-10 PROCEDURE — 36415 COLL VENOUS BLD VENIPUNCTURE: CPT

## 2025-06-10 PROCEDURE — 92610 EVALUATE SWALLOWING FUNCTION: CPT

## 2025-06-10 PROCEDURE — 97161 PT EVAL LOW COMPLEX 20 MIN: CPT

## 2025-06-10 PROCEDURE — 97166 OT EVAL MOD COMPLEX 45 MIN: CPT

## 2025-06-10 PROCEDURE — 97530 THERAPEUTIC ACTIVITIES: CPT

## 2025-06-10 PROCEDURE — 94761 N-INVAS EAR/PLS OXIMETRY MLT: CPT

## 2025-06-10 PROCEDURE — 6360000002 HC RX W HCPCS: Performed by: NURSE PRACTITIONER

## 2025-06-10 PROCEDURE — 80048 BASIC METABOLIC PNL TOTAL CA: CPT

## 2025-06-10 PROCEDURE — 85027 COMPLETE CBC AUTOMATED: CPT

## 2025-06-10 PROCEDURE — 83036 HEMOGLOBIN GLYCOSYLATED A1C: CPT

## 2025-06-10 PROCEDURE — 2500000003 HC RX 250 WO HCPCS: Performed by: INTERNAL MEDICINE

## 2025-06-10 PROCEDURE — 6360000002 HC RX W HCPCS: Performed by: INTERNAL MEDICINE

## 2025-06-10 PROCEDURE — 80061 LIPID PANEL: CPT

## 2025-06-10 PROCEDURE — 6370000000 HC RX 637 (ALT 250 FOR IP): Performed by: INTERNAL MEDICINE

## 2025-06-10 PROCEDURE — 92523 SPEECH SOUND LANG COMPREHEN: CPT

## 2025-06-10 PROCEDURE — 94760 N-INVAS EAR/PLS OXIMETRY 1: CPT

## 2025-06-10 PROCEDURE — 2000000000 HC ICU R&B

## 2025-06-10 RX ORDER — PANTOPRAZOLE SODIUM 40 MG/1
40 TABLET, DELAYED RELEASE ORAL DAILY
Status: DISCONTINUED | OUTPATIENT
Start: 2025-06-11 | End: 2025-06-18 | Stop reason: HOSPADM

## 2025-06-10 RX ORDER — BACLOFEN 10 MG/1
10 TABLET ORAL NIGHTLY
Status: DISCONTINUED | OUTPATIENT
Start: 2025-06-10 | End: 2025-06-18 | Stop reason: HOSPADM

## 2025-06-10 RX ORDER — LORAZEPAM 2 MG/ML
0.5 INJECTION INTRAMUSCULAR ONCE
Status: COMPLETED | OUTPATIENT
Start: 2025-06-10 | End: 2025-06-10

## 2025-06-10 RX ADMIN — ENOXAPARIN SODIUM 40 MG: 100 INJECTION SUBCUTANEOUS at 21:02

## 2025-06-10 RX ADMIN — SODIUM CHLORIDE, PRESERVATIVE FREE 10 ML: 5 INJECTION INTRAVENOUS at 08:44

## 2025-06-10 RX ADMIN — SODIUM CHLORIDE, PRESERVATIVE FREE 10 ML: 5 INJECTION INTRAVENOUS at 21:04

## 2025-06-10 RX ADMIN — ASPIRIN 300 MG: 300 SUPPOSITORY RECTAL at 10:43

## 2025-06-10 RX ADMIN — BACLOFEN 10 MG: 10 TABLET ORAL at 21:02

## 2025-06-10 RX ADMIN — LORAZEPAM 0.5 MG: 2 INJECTION INTRAMUSCULAR; INTRAVENOUS at 13:57

## 2025-06-10 RX ADMIN — ATORVASTATIN CALCIUM 80 MG: 80 TABLET, FILM COATED ORAL at 21:02

## 2025-06-10 ASSESSMENT — PAIN SCALES - GENERAL
PAINLEVEL_OUTOF10: 0

## 2025-06-10 NOTE — PLAN OF CARE
Problem: Pain  Goal: Verbalizes/displays adequate comfort level or baseline comfort level  Outcome: Progressing     Problem: Chronic Conditions and Co-morbidities  Goal: Patient's chronic conditions and co-morbidity symptoms are monitored and maintained or improved  Outcome: Progressing  Flowsheets (Taken 6/10/2025 0800)  Care Plan - Patient's Chronic Conditions and Co-Morbidity Symptoms are Monitored and Maintained or Improved:   Monitor and assess patient's chronic conditions and comorbid symptoms for stability, deterioration, or improvement   Collaborate with multidisciplinary team to address chronic and comorbid conditions and prevent exacerbation or deterioration     Problem: Discharge Planning  Goal: Discharge to home or other facility with appropriate resources  Outcome: Progressing  Flowsheets (Taken 6/10/2025 0800)  Discharge to home or other facility with appropriate resources: Identify barriers to discharge with patient and caregiver     Problem: Safety - Adult  Goal: Free from fall injury  Outcome: Progressing     Problem: Skin/Tissue Integrity  Goal: Skin integrity remains intact  Description: 1.  Monitor for areas of redness and/or skin breakdown2.  Assess vascular access sites hourly3.  Every 4-6 hours minimum:  Change oxygen saturation probe site4.  Every 4-6 hours:  If on nasal continuous positive airway pressure, respiratory therapy assess nares and determine need for appliance change or resting period  Outcome: Progressing  Flowsheets (Taken 6/10/2025 0800)  Skin Integrity Remains Intact: Monitor for areas of redness and/or skin breakdown     Problem: Neurosensory - Adult  Goal: Achieves stable or improved neurological status  Outcome: Progressing  Flowsheets (Taken 6/10/2025 0800)  Achieves stable or improved neurological status:   Monitor temperature, glucose, and sodium. Initiate appropriate interventions as ordered   Maintain blood pressure and fluid volume within ordered parameters to

## 2025-06-10 NOTE — PLAN OF CARE
Brief Stroke Team Note  Called for worsening exam. Initial ED call fielded by day team. Darshana Grace is a 80 yo F with prior Bell's palsy, GERD, HTN who presented after being found down and agitated. No clear LKN, day prior estimated. HCT with L occipital hypodensity and L P1 occlusion, severe R P2 stenosis. Now with reported R sided neglect and worsening NIHSS. HCT planned, I recommended a repeat CTA as well to ensure there has not been any basilar extension of the P1 thrombosis. Unfortunately, she is not a candidate for thrombolytics and unlikely to be an EVT candidate, barring basilar extension. I would discuss any additional next steps with local neurology.

## 2025-06-10 NOTE — PROGRESS NOTES
[facility] - Physical Therapy   Phone: [phone]    Physical Therapy  Facility/Department:[address] ICU    [x] Initial Evaluation            [x] Daily Treatment Note         [] Discharge Summary      Patient: Darshana Grace   : 1945   MRN: [id number]   Date of Service:  6/10/2025  Staff Mobility Recommendation: RW x 1    AM-PAC score:   Discharge Recommendations: ARU vs home     Admitting Diagnosis: Stroke-like symptoms  Ordering Physician: Phuong Bains MD on 25  Current Admission Summary: \"Darshana Grace is a 79 y.o. female presenting to the ED for altered mental status.  Patient denies show to work today therefore a welfare check was done.  Patient was found disoriented with inability to follow commands and answer questions appropriately.  Patient has a past medical history of Bell's palsy and stroke with residual lower face droop.  Patient does have dysarthria at baseline however family states her dysarthria is worse compared to her baseline.  Patient denies chest pain or shortness of breath.  Patient is agitated and noncompliant with examination and interview.  Last well-known was potentially sometime yesterday.\"     Past Medical History:  has a past medical history of Arthritis, Bell's palsy, Cerebral artery occlusion with cerebral infarction (HCC), CKD (chronic kidney disease), Claustrophobia, Diabetes mellitus (HCC), Esophageal reflux, Hx of blood clots, Hyperlipidemia, Hypertension, Kidney stone, Nervous, Pulmonary embolism (HCC), and Stuttering.  Past Surgical History:  has a past surgical history that includes Hysterectomy; Appendectomy; Cardiac catheterization; Cholecystectomy; Colonoscopy; Tonsillectomy; knee surgery (Right); blepharoplasty (Bilateral, 2021); Abdomen surgery; and Endoscopy, colon, diagnostic.    Assessment  Activity Tolerance: Fair  Impairments Requiring Therapeutic Intervention: decreased functional mobility, decreased ADL status,

## 2025-06-10 NOTE — PROGRESS NOTES
4 Eyes Skin Assessment     NAME:  Darshana Grace  YOB: 1945  MEDICAL RECORD NUMBER:  [id number]    The patient is being assessed for  Admission    I agree that at least one RN has performed a thorough Head to Toe Skin Assessment on the patient. ALL assessment sites listed below have been assessed.      Areas assessed by both nurses:    Head, Face, Ears, Shoulders, Back, Chest, Arms, Elbows, Hands, Sacrum. Buttock, Coccyx, Ischium, Legs. Feet and Heels, and Under Medical Devices         Does the Patient have a Wound? No noted wound(s)   Blanchable redness noted to buttocks and bl heels  L heel callued area  Alhaji Prevention initiated by RN: Yes  Wound Care Orders initiated by RN: No    Pressure Injury (Stage 3,4, Unstageable, DTI, NWPT, and Complex wounds) if present, place Wound referral order by RN under : No    New Ostomies, if present place, Ostomy referral order under : No     Nurse 1 eSignature: Electronically signed by Erin Tolentino RN on 6/9/25 at 8:12 PM EDT    **SHARE this note so that the co-signing nurse can place an eSignature**    Nurse 2 eSignature: Electronically signed by Twila Mcghee RN on 6/9/25 at 8:16 PM EDT

## 2025-06-10 NOTE — PROGRESS NOTES
Call made to Dr. Lee Solano with Neurology regarding repeat CT results. No new orders at this time. Allow for permissive HTN & plan for MRI tomorrow.

## 2025-06-10 NOTE — CONSULTS
Neurology Consult Note  Reason for Consult: stroke like symptoms    Chief complaint: I don't know    Phuong Hernandez MD asked me to see Darshana Grace in consultation for evaluation of stroke l sheree symptoms    History of Present Illness:  Darshana Grace is a 79 y.o. female who presents with altered mental status.     I obtained my information via chart review.  The patient can provide some information but is unable to elaborate on what exactly brought her to the hospital.     The patient says that she lives in a trailer w/ a couple of family members.  She is unsure what occurred yesterday.  Apparently she had not shown up for work and so a welfare check was completed and she was found altered.  She was not following commands well.  She was not answering questions appropriately.  She reportedly has some sort of speech impairment at baseline though family felt this may be worse.  Last known well was not known.  She was subsequently transported to the ED just prior to Noon yesterday to be evaluated.      Initial BP was 169/94.  No fever.  CT head no acute hemorrhage.  CTA head/neck w/ L PCA occlusion.  No acute neurologic interventions were pursued.      NIH worsened overnight (8->14) so repeat CT/CTA were obtained which were essentially unchanged.     Currently she appears to have stabilized.      She does have a hx of [place] palsy (L) w/ chronic deficit.      She is not on antiplatelet therapy at home.  She does say that she has a hx of stroke from many years ago though previous brain MRIs did not show any evidence of this.      Medical History:  Past Medical History:   Diagnosis Date    Arthritis     Bell's palsy     Cerebral artery occlusion with cerebral infarction (HCC)     CKD (chronic kidney disease)     Claustrophobia     Diabetes mellitus (HCC)     Esophageal reflux     Hx of blood clots     X 14    Hyperlipidemia     Hypertension     Kidney stone     Nervous     Pulmonary embolism (HCC)

## 2025-06-10 NOTE — PROGRESS NOTES
Spoke with  stroke team and updated with pt condition and worsening exam.  stated he has no further recommendations at this time and to refer to neurology with needs.

## 2025-06-10 NOTE — PROGRESS NOTES
4 Eyes Skin Assessment     NAME:  Darshana Grace  YOB: 1945  MEDICAL RECORD NUMBER:  [id number]    The patient is being assessed for  Transfer to New Unit    I agree that at least one RN has performed a thorough Head to Toe Skin Assessment on the patient. ALL assessment sites listed below have been assessed.      Areas assessed by both nurses:    Head, Face, Ears, Shoulders, Back, Chest, Arms, Elbows, Hands, Sacrum. Buttock, Coccyx, Ischium, Legs. Feet and Heels, and Under Medical Devices         Does the Patient have a Wound? No noted wound(s)       Alhaji Prevention initiated by RN: Yes  Wound Care Orders initiated by RN: No    Pressure Injury (Stage 3,4, Unstageable, DTI, NWPT, and Complex wounds) if present, place Wound referral order by RN under : No    New Ostomies, if present place, Ostomy referral order under : No     Nurse 1 eSignature: Electronically signed by Lion Mendez RN on 6/9/25 at 10:56 PM EDT    **SHARE this note so that the co-signing nurse can place an eSignature**    Nurse 2 eSignature: Electronically signed by Shelia Grier RN on 6/9/25 at 11:33 PM EDT

## 2025-06-10 NOTE — PROGRESS NOTES
V2.0    [facility] [place] Progress Note      Name:  Darshana Grace /Age/Sex: 1945  (79 y.o. female)   MRN & CSN:  [id number] & [id number] Encounter Date/Time: 6/10/2025 12:30 PM EDT   Location:  [id number] PCP: Eugenia Lamb MD     Attending:Phuong Bains MD       Hospital Day: 2    Assessment and Recommendations   Darshana Grace is a 79 y.o. female with a pmh of CVA, bells palsy, dysarthria brought to hospital with strokelike symptoms.     Plan:   Strokelike symptoms, patient presenting with confusion, worsening dysarthria, and agitation. CT head with age-indeterminate infarct involving left occipital lobe.  CTA head and neck complete occlusion of the left posterior cerebral artery and focal stenosis of the right posterior cerebral artery.  Worsening neuroexam on  after admission, transferred to ICU for close monitoring. Repeat CT head and CTA head and neck no significant interval change.  MRI brain ordered.  2D echo pending.  Continue aspirin.  Continue statin.  Continue permissive hypertension.  Neurology on consult.  PT, OT, speech swallow therapy on consult.  Continue frequent neurochecks.  History of Bell's palsy with chronic deficit  Depression, patient reported not taking any of her depression medications.  Will consult telepsych  GERD, continue PPI  Hyperlipidemia, on Lipitor  Hypertension, hold BP medications for now  Overactive bladder, patient reported not taking oxybutynin anymore  History of DVT, treated with Coumadin in the past, currently not on Coumadin      Diet ADULT DIET; Dysphagia - Minced and Moist   DVT Prophylaxis [x] Lovenox, []  Heparin, [] SCDs, [] Ambulation,  [] Eliquis, [] Xarelto  [] Coumadin   Code Status Full Code             Personally reviewed Lab Studies and Imaging   EKG rhythm strip reviewed showing first-degree AV block  Medical Decision Making:  The following items were considered in medical decision making:  Discussion of patient care with other  adjustment of the mA/kV  was utilized to reduce the radiation dose to as low as reasonably achievable. Stenosis of the internal carotid arteries measured using NASCET criteria. COMPARISON: None available CLINICAL HISTORY: Stroke Symptoms. ORDERING SYSTEM PROVIDED HISTORY:Stroke Symptoms; TECHNOLOGIST PROVIDED HISTORY: Has a \"code stroke\" or \"stroke alert\" been called?->Yes; Reason for exam:->Stroke Symptoms; Decision Support Exception - unselect if not a suspected or confirmed emergency medical condition->Emergency Medical Condition (MA); Reason for exam: AMS, unsure of baseline, possible hx of stroke FINDINGS: CTA NECK: AORTIC ARCH AND ARCH VESSELS: Atherosclerosis of the aortic arch and arch vessels. No significant stenosis of the brachiocephalic or subclavian arteries. CAROTID ARTERIES: Atherosclerosis involving the carotid bulbs and cervical internal carotid arteries bilaterally. No flow-limiting stenosis involving the common carotid or cervical internal carotid arteries. VERTEBRAL ARTERIES: No significant stenosis of the vertebral arteries. SOFT TISSUES: The lung apices are clear. No cervical or superior mediastinal lymphadenopathy. BONES: No acute osseous abnormality. CTA HEAD: ANTERIOR CIRCULATION: Atherosclerosis of the intracranial internal carotid arteries bilaterally. No significant stenosis seen of the internal carotids. No significant stenosis of the anterior cerebral or middle cerebral arteries. No aneurysm identified. POSTERIOR CIRCULATION: There is occlusion of the left posterior cerebral artery at the level of the P1 segment. Severe stenosis of the right P2 segment. No significant stenosis seen of the basilar or vertebral arteries. No aneurysm identified. OTHER: No dural venous sinus thrombosis on this non-dedicated study. BRAIN: Questionable age indeterminate infarct  involving the left occipital lobe. No mass effect or midline shift. No evidence of an acute intracranial hemorrhage.     1.

## 2025-06-10 NOTE — PROGRESS NOTES
Facility/Department: [address] ICU  SLP Clinical Swallow Evaluation and Speech Language Cognitive Assessment     Patient: Darshana Grace   : 1945   MRN: [id number]      Evaluation Date: 6/10/2025      Admitting Dx:   Stroke-like symptoms [R29.90]   has a past medical history of Arthritis, Bell's palsy, Cerebral artery occlusion with cerebral infarction (HCC), CKD (chronic kidney disease), Claustrophobia, Diabetes mellitus (HCC), Esophageal reflux, blood clots, Hyperlipidemia, Hypertension, Kidney stone, Nervous, Pulmonary embolism (HCC), and Stuttering.   has a past surgical history that includes Hysterectomy; Appendectomy; Cardiac catheterization; Cholecystectomy; Colonoscopy; Tonsillectomy; knee surgery (Right); blepharoplasty (Bilateral, 2021); Abdomen surgery; and Endoscopy, colon, diagnostic.  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    CURRENT ENCOUNTER DIAGNOSTICS/COURSE OF ADMISSION     2025 admitted with c/o stroke-like symptoms. MD ADMISSION H&P HPI: \"Darshnaa Grace is a 79 y.o. female with a pmh of CVA, bells palsy, dysarthria brought to hospital with strokelike symptoms.  Patient apparently did not show to the OR today, coworkers checked on her at home and he found to be confused, noted worsening speech compared to baseline and agitation.  Brought to hospital for further care. In ER blood pressure noted to be 178/83, workup in the hospital

## 2025-06-10 NOTE — PROGRESS NOTES
Spoke with Dr Solano regarding pt change in NIH.  wants pt to transfer to ICU for more frequent neuro checks and monitoring.  also instructed staff to call  stroke team back for further recommendations.

## 2025-06-10 NOTE — PROGRESS NOTES
Occupational Therapy    [facility] - Occupational Therapy   Phone: [phone]    Occupational Therapy  Facility/Department:[address] ICU    [x] Initial Evaluation            [x] Daily Treatment Note         [] Discharge Summary      Patient: Darshana Grace   : 1945   MRN: [id number]   Date of Service:  6/10/2025    Staff Mobility Recommendation: RW x1 with gait belt    AM-PAC Score:   Discharge Recommendations: ARU, 5-7x/week    Admitting Diagnosis:  Stroke-like symptoms  Ordering Physician: Dr. Bains  Current Admission Summary: Per H&P note on , \"Darshana Grace is a 79 y.o. female with a pmh of CVA, bells palsy, dysarthria brought to hospital with strokelike symptoms.\"    Past Medical History:  has a past medical history of Arthritis, Bell's palsy, Cerebral artery occlusion with cerebral infarction (HCC), CKD (chronic kidney disease), Claustrophobia, Diabetes mellitus (HCC), Esophageal reflux, Hx of blood clots, Hyperlipidemia, Hypertension, Kidney stone, Nervous, Pulmonary embolism (HCC), and Stuttering.  Past Surgical History:  has a past surgical history that includes Hysterectomy; Appendectomy; Cardiac catheterization; Cholecystectomy; Colonoscopy; Tonsillectomy; knee surgery (Right); blepharoplasty (Bilateral, 2021); Abdomen surgery; and Endoscopy, colon, diagnostic.    Assessment  Activity Tolerance: Good  Impairments Requiring Therapeutic Intervention: decreased functional mobility, decreased ADL status, decreased strength, decreased safety awareness, decreased cognition, decreased endurance, decreased balance, decreased fine motor control, decreased coordination  Prognosis: good    Clinical Assessment: PTA, pt living at home alone where she is typically independent with ADLs and fxl mobility without device. This date, pt functioning below baseline mostly limited due to decreased strength, balance and coordination. Pt completes bed mobility SBA, fxl transfers CGA and

## 2025-06-10 NOTE — PLAN OF CARE
Problem: Pain  Goal: Verbalizes/displays adequate comfort level or baseline comfort level  Outcome: Progressing  Flowsheets (Taken 6/9/2025 2257)  Verbalizes/displays adequate comfort level or baseline comfort level:   Encourage patient to monitor pain and request assistance   Assess pain using appropriate pain scale   Administer analgesics based on type and severity of pain and evaluate response   Implement non-pharmacological measures as appropriate and evaluate response     Problem: Chronic Conditions and Co-morbidities  Goal: Patient's chronic conditions and co-morbidity symptoms are monitored and maintained or improved  Outcome: Progressing  Flowsheets (Taken 6/9/2025 2245)  Care Plan - Patient's Chronic Conditions and Co-Morbidity Symptoms are Monitored and Maintained or Improved:   Monitor and assess patient's chronic conditions and comorbid symptoms for stability, deterioration, or improvement   Collaborate with multidisciplinary team to address chronic and comorbid conditions and prevent exacerbation or deterioration   Update acute care plan with appropriate goals if chronic or comorbid symptoms are exacerbated and prevent overall improvement and discharge     Problem: Discharge Planning  Goal: Discharge to home or other facility with appropriate resources  Outcome: Progressing  Flowsheets (Taken 6/9/2025 2245)  Discharge to home or other facility with appropriate resources:   Identify barriers to discharge with patient and caregiver   Arrange for needed discharge resources and transportation as appropriate   Identify discharge learning needs (meds, wound care, etc)   Arrange for interpreters to assist at discharge as needed     Problem: Safety - Adult  Goal: Free from fall injury  Outcome: Progressing     Problem: Skin/Tissue Integrity  Goal: Skin integrity remains intact  Description: 1.  Monitor for areas of redness and/or skin breakdown2.  Assess vascular access sites hourly3.  Every 4-6 hours

## 2025-06-10 NOTE — PROGRESS NOTES
NAME:  Darshana Grace  YOB: 1945  MEDICAL RECORD NUMBER:  [id number]    Shift Summary: Pt tx from 5N to ICU for increased NIHSS scores. Q1hr NIHSS. Repeat head CT neg, neurology aware. Plan for MRI today.    Family updated: Yes:  Annabelle Galindo informed of transfer    Rhythm: Normal Sinus Rhythm     Most recent vitals:   Visit Vitals  /80   Pulse 74   Temp 98.6 °F (37 °C) (Oral)   Resp 17   Wt 70.1 kg (154 lb 8.7 oz)   SpO2 96%   BMI 24.20 kg/m²           No data found.    No data found.      Respiratory support needed (if any):  - RA    Admission weight Weight - Scale: 72 kg (158 lb 11.7 oz) (06/09/25 1250)    Today's weight    Wt Readings from Last 1 Encounters:   06/09/25 70.1 kg (154 lb 8.7 oz)        Gama need assessed each shift: N/A - no gama present  UOP >30ml/hr: YES  Last documented BM (in last 48 hrs):  No data found.             Restraints (in use currently or dc'd in last 12 hrs): No    Order current and documentation up to date? Yes    Lines/Drains reviewed @ bedside.  Peripheral IV 06/09/25 Right Antecubital (Active)   Number of days: 0         Drip rates at handoff:    sodium chloride         Lab Data:   CBC:   Recent Labs     06/09/25  1243   WBC 7.7   HGB 15.5   HCT 45.4   MCV 91.4        BMP:    Recent Labs     06/09/25  1243      K 3.9   CO2 26   BUN 14   CREATININE 0.9     ABG: No results for input(s): \"PHART\", \"[id number]\", \"PO2ART\" in the last 72 hours.    Any consults during the shift? No    Any signed and held orders to be released?  No        4 Eyes Skin Assessment       The patient is being assessed for  Shift Handoff    I agree that at least one RN has performed a thorough Head to Toe Skin Assessment on the patient. ALL assessment sites listed below have been assessed.      Areas assessed by both nurses: Head, Face, Ears, Shoulders, Back, Chest, Arms, Elbows, Hands, Sacrum. Buttock, Coccyx, Ischium, Legs. Feet and Heels, and Under Medical Devices          Does the Patient have a Wound? No noted wound(s)    Wound Care Orders initiated or active by RN: No       Alhaji Prevention initiated or active: Yes    Pressure Injury (Stage 3,4, Unstageable, DTI, NWPT, and Complex wounds): No  If present, place \"IP Wound Care/Ostomy Nurse Eval and Treat\" in : No    Ostomies present: No  If new Ostomy, place \"IP Wound Care/Ostomy Nurse Eval and Treat\" in : No     Nurse 1 eSignature: Electronically signed by Lion Mendez RN on 6/10/25 at 2:26 AM EDT    **SHARE this note so that the co-signing nurse can place an eSignature**    Nurse 2 eSignature: Electronically signed by Bridget Gore RN on 6/10/25 at 8:04 AM EDT

## 2025-06-10 NOTE — PROGRESS NOTES
SLP NOTE    Evaluation orders received, chart reviewed, and patient discussed with RN. Events noted. RN with reprot for improving status, but agitation at this time. ST to hold with plan to re-attempt (pending status) later this date as schedule permits unless otherwise notified.    Thank you.  Eusebia Kennedy MA, CCC-SLP, #[id number]  Speech-Language Pathologist  Portable phone: [phone]

## 2025-06-10 NOTE — ACP (ADVANCE CARE PLANNING)
Advance Care Planning   Healthcare Decision Maker:    Primary Decision Maker: ElmerAnnabelleMary - Friend - [phone]    Secondary Decision Maker: Maddy Fallon - Friend - [phone]      Today we patient reports Annabelle Hoyos and Maddy are her HC POA,         Paperwork needed.    Electronically signed by HERNANDEZ Arevalo on 6/10/2025 at 6:03 PM

## 2025-06-10 NOTE — CARE COORDINATION
Case Management Assessment  Initial Evaluation    Date/Time of Evaluation: 6/10/2025 6:09 PM  Assessment Completed by: HERNANDEZ Arevalo    If patient is discharged prior to next notation, then this note serves as note for discharge by case management.    Patient Name: Darshana Grace                   YOB: 1945  Diagnosis: Stroke-like symptoms [R29.90]                   Date / Time: 6/9/2025 11:58 AM    Patient Admission Status: Inpatient   Readmission Risk (Low < 19, Mod (19-27), High > 27): Readmission Risk Score: 14.4    Current PCP: Eugenia Lamb MD  PCP verified by CM? Yes    Chart Reviewed: Yes      History Provided by: Patient  Patient Orientation: Alert and Oriented    Patient Cognition: Alert    Hospitalization in the last 30 days (Readmission):  No    If yes, Readmission Assessment in  Navigator will be completed.    Advance Directives:      Code Status: Full Code   Patient's Primary Decision Maker is:  (pt identifed - Documents needed.)    Primary Decision Maker: Annabelle Payne - Friend - [phone]    Secondary Decision Maker: Maddy Fallon - Friend - [phone]    Discharge Planning:    Patient lives with: Alone Type of Home: Trailer/Mobile Home  Primary Care Giver: Self  Patient Support Systems include: Family Members, Friends/Neighbors   Current Financial resources: Medicare  Current community resources:    Current services prior to admission: None            Current DME:              Type of Home Care services:  None    ADLS  Prior functional level: Independent in ADLs/IADLs  Current functional level: Assistance with the following:, Housework, Cooking, Shopping, Mobility, Bathing, Toileting, Dressing    PT AM-PAC: 17 /24  OT AM-PAC: 17 /24    Family can provide assistance at DC: No  Would you like Case Management to discuss the discharge plan with any other family members/significant others, and if so, who? No  Plans to Return to Present Housing: Unknown at

## 2025-06-10 NOTE — PROGRESS NOTES
Pt transferred to ICU for closer monitoring. Receiving RN at bedside. All questions answered. Pt BHUMI, Annabelle Burns, called and informed of pt transfer to ICU and updated with POC and pt condition.

## 2025-06-10 NOTE — VIRTUAL HEALTH
Est, Glom Filt Rate 65 >60    Calcium 9.6 8.3 - 10.6 mg/dL    Total Protein 6.8 6.4 - 8.2 g/dL    Albumin 4.3 3.4 - 5.0 g/dL    Albumin/Globulin Ratio 1.7 1.1 - 2.2    Total Bilirubin 0.7 0.0 - 1.0 mg/dL    Alkaline Phosphatase 112 40 - 129 U/L    ALT 15 10 - 40 U/L    AST 20 15 - 37 U/L   Troponin    Collection Time: 06/09/25 12:43 PM   Result Value Ref Range    Troponin, High Sensitivity 11 0 - 14 ng/L   Protime-INR    Collection Time: 06/09/25 12:43 PM   Result Value Ref Range    Protime 12.6 11.9 - 14.9 sec    INR 0.92 0.85 - 1.15   EKG 12 Lead    Collection Time: 06/09/25  1:06 PM   Result Value Ref Range    Ventricular Rate 87 BPM    Atrial Rate 87 BPM    P-R Interval 186 ms    QRS Duration 136 ms    Q-T Interval 412 ms    QTc Calculation (Bazett) 495 ms    P Axis 56 degrees    R Axis -60 degrees    T Axis 19 degrees    Diagnosis       Normal sinus rhythmRight bundle branch blockLeft anterior fascicular blockMinimal voltage criteria for LVH, may be normal variant ( R in aVL )Abnormal ECGConfirmed by MARVA FRANCO ([id number]) on 6/9/2025 9:39:53 PM   POCT Glucose    Collection Time: 06/09/25  1:25 PM   Result Value Ref Range    QC OK? 111    Urinalysis with Reflex to Culture    Collection Time: 06/09/25  3:03 PM    Specimen: Urine   Result Value Ref Range    Color, UA Yellow Straw/Yellow    Clarity, UA Clear Clear    Glucose, Ur Negative Negative mg/dL    Bilirubin, Urine Negative Negative    Ketones, Urine Negative Negative mg/dL    Specific Gravity, UA 1.024 1.005 - 1.030    Blood, Urine Negative Negative    pH, Urine 8.0 5.0 - 8.0    Protein, UA Negative Negative mg/dL    Urobilinogen, Urine 0.2 <2.0 E.U./dL    Nitrite, Urine Negative Negative    Leukocyte Esterase, Urine TRACE (A) Negative    Microscopic Examination YES     Urine Type NotGiven     Urine Reflex to Culture Not Indicated    Microscopic Urinalysis    Collection Time: 06/09/25  3:03 PM   Result Value Ref Range    Bacteria, UA None Seen None  imminent harm to herself or others.  She is alert and oriented to person, place, and time.  She does not meet criteria for involuntary psychiatric hold or inpatient psychiatric admission at this time.    Dx:   Adjustment Disorder    Per History:  Depression  Anxiety  Conversion Disorder  Dysarthria    Plan:  The patient is cleared to be discharged from a psychiatric point of view, when medically appropriate.  Patient does not meet criteria for a psychiatric hold at this time and Ongoing medical management and stabilization per primary team   Medical co-morbidities: Management per medical providers, appreciate assistance.  Medication Recommendations:   -obtain TSH, vit D 25 hydroxy   -obtain baseline MOCA  -PT/OT with BENNETT  -Speech therapy  -case management/social work referrals  -neurology referral and monitoring of cognition  -lamotrigine 25 mg po HS - mood  -sertraline 25 mg po daily - mood  Reviewed treatment plan with patient including risks, benefits, alternatives, and side effects of medications, and any/all black box warnings. Patient verbalized understanding.  Patient had an opportunity to ask questions and address concerns. Obtained informed consent for treatment.  Medical records, labs, and diagnostic tests reviewed.   Psychiatry will sign-off at this time   Thank you for this consult.  Discussed recommendations with Phuong Bains MD at time of consult completion.    TelePsych recommendations:Medical Floor    Legal hold: No Involuntary Hold    Please send message or call via [employer] if anything more is required.     Electronically signed by RONN Wright CNP on 6/10/2025 at 12:59 PM.    END OF NOTE  -------------------------          Darshana Grace, was evaluated through a synchronous (real-time) audio-video encounter. The patient (and/or guardian if applicable) is aware that this is a billable service, which includes applicable co-pays. This virtual visit was conducted with

## 2025-06-10 NOTE — PROGRESS NOTES
Patient arrived to room 2107 via bed from 5N. Handoff NIHSS 14, see flowsheet. Pt A&Ox2 & tearful at this time. VSS.

## 2025-06-11 ENCOUNTER — APPOINTMENT (OUTPATIENT)
Age: 80
DRG: 064 | End: 2025-06-11
Attending: STUDENT IN AN ORGANIZED HEALTH CARE EDUCATION/TRAINING PROGRAM
Payer: MEDICARE

## 2025-06-11 LAB
ECHO AO ASC DIAM: 3 CM
ECHO AO ASCENDING AORTA INDEX: 1.66 CM/M2
ECHO AO ROOT DIAM: 3.5 CM
ECHO AO ROOT INDEX: 1.93 CM/M2
ECHO AV AREA PEAK VELOCITY: 2.2 CM2
ECHO AV AREA VTI: 2.1 CM2
ECHO AV AREA/BSA PEAK VELOCITY: 1.2 CM2/M2
ECHO AV AREA/BSA VTI: 1.2 CM2/M2
ECHO AV CUSP MM: 2.1 CM
ECHO AV MEAN GRADIENT: 4 MMHG
ECHO AV MEAN VELOCITY: 0.9 M/S
ECHO AV PEAK GRADIENT: 6 MMHG
ECHO AV PEAK VELOCITY: 1.3 M/S
ECHO AV VELOCITY RATIO: 0.69
ECHO AV VTI: 24.4 CM
ECHO BSA: 1.82 M2
ECHO EST RA PRESSURE: 3 MMHG
ECHO IVC PROX: 1.9 CM
ECHO LA AREA 2C: 17.9 CM2
ECHO LA AREA 4C: 14.4 CM2
ECHO LA MAJOR AXIS: 5.8 CM
ECHO LA MINOR AXIS: 5.4 CM
ECHO LA VOL BP: 39 ML (ref 22–52)
ECHO LA VOL MOD A2C: 49 ML (ref 22–52)
ECHO LA VOL MOD A4C: 29 ML (ref 22–52)
ECHO LA VOL/BSA BIPLANE: 22 ML/M2 (ref 16–34)
ECHO LA VOLUME INDEX MOD A2C: 27 ML/M2 (ref 16–34)
ECHO LA VOLUME INDEX MOD A4C: 16 ML/M2 (ref 16–34)
ECHO LV E' LATERAL VELOCITY: 6.64 CM/S
ECHO LV E' SEPTAL VELOCITY: 6.09 CM/S
ECHO LV EDV A2C: 81 ML
ECHO LV EDV A4C: 68 ML
ECHO LV EDV INDEX A4C: 38 ML/M2
ECHO LV EDV NDEX A2C: 45 ML/M2
ECHO LV EF PHYSICIAN: 58 %
ECHO LV EJECTION FRACTION A2C: 56 %
ECHO LV EJECTION FRACTION A4C: 61 %
ECHO LV EJECTION FRACTION BIPLANE: 58 % (ref 55–100)
ECHO LV ESV A2C: 36 ML
ECHO LV ESV A4C: 26 ML
ECHO LV ESV INDEX A2C: 20 ML/M2
ECHO LV ESV INDEX A4C: 14 ML/M2
ECHO LV FRACTIONAL SHORTENING: 31 % (ref 28–44)
ECHO LV INTERNAL DIMENSION DIASTOLE INDEX: 2.15 CM/M2
ECHO LV INTERNAL DIMENSION DIASTOLIC: 3.9 CM (ref 3.9–5.3)
ECHO LV INTERNAL DIMENSION SYSTOLIC INDEX: 1.49 CM/M2
ECHO LV INTERNAL DIMENSION SYSTOLIC: 2.7 CM
ECHO LV IVSD: 1.1 CM (ref 0.6–0.9)
ECHO LV MASS 2D: 149.5 G (ref 67–162)
ECHO LV MASS INDEX 2D: 82.6 G/M2 (ref 43–95)
ECHO LV POSTERIOR WALL DIASTOLIC: 1.2 CM (ref 0.6–0.9)
ECHO LV RELATIVE WALL THICKNESS RATIO: 0.62
ECHO LVOT AREA: 3.1 CM2
ECHO LVOT AV VTI INDEX: 0.65
ECHO LVOT DIAM: 2 CM
ECHO LVOT MEAN GRADIENT: 2 MMHG
ECHO LVOT PEAK GRADIENT: 3 MMHG
ECHO LVOT PEAK VELOCITY: 0.9 M/S
ECHO LVOT STROKE VOLUME INDEX: 27.6 ML/M2
ECHO LVOT SV: 49.9 ML
ECHO LVOT VTI: 15.9 CM
ECHO MV A VELOCITY: 1.06 M/S
ECHO MV E DECELERATION TIME (DT): 222 MS
ECHO MV E VELOCITY: 0.52 M/S
ECHO MV E/A RATIO: 0.49
ECHO MV E/E' LATERAL: 7.83
ECHO MV E/E' RATIO (AVERAGED): 8.18
ECHO MV E/E' SEPTAL: 8.54
ECHO RA AREA 4C: 11.9 CM2
ECHO RA END SYSTOLIC VOLUME APICAL 4 CHAMBER INDEX BSA: 11 ML/M2
ECHO RA VOLUME: 20 ML
ECHO RIGHT VENTRICULAR SYSTOLIC PRESSURE (RVSP): 15 MMHG
ECHO RV BASAL DIMENSION: 2.8 CM
ECHO RV LONGITUDINAL DIMENSION: 7.7 CM
ECHO RV MID DIMENSION: 2.9 CM
ECHO RV TAPSE: 2.3 CM (ref 1.7–?)
ECHO TV REGURGITANT MAX VELOCITY: 1.75 M/S
ECHO TV REGURGITANT PEAK GRADIENT: 12 MMHG
GI PATHOGENS PNL STL NAA+PROBE: NORMAL

## 2025-06-11 PROCEDURE — 93306 TTE W/DOPPLER COMPLETE: CPT

## 2025-06-11 PROCEDURE — 1200000000 HC SEMI PRIVATE

## 2025-06-11 PROCEDURE — 87506 IADNA-DNA/RNA PROBE TQ 6-11: CPT

## 2025-06-11 PROCEDURE — 94760 N-INVAS EAR/PLS OXIMETRY 1: CPT

## 2025-06-11 PROCEDURE — 97110 THERAPEUTIC EXERCISES: CPT

## 2025-06-11 PROCEDURE — 2500000003 HC RX 250 WO HCPCS: Performed by: INTERNAL MEDICINE

## 2025-06-11 PROCEDURE — 99222 1ST HOSP IP/OBS MODERATE 55: CPT | Performed by: NURSE PRACTITIONER

## 2025-06-11 PROCEDURE — 97130 THER IVNTJ EA ADDL 15 MIN: CPT

## 2025-06-11 PROCEDURE — 6370000000 HC RX 637 (ALT 250 FOR IP): Performed by: INTERNAL MEDICINE

## 2025-06-11 PROCEDURE — 97530 THERAPEUTIC ACTIVITIES: CPT

## 2025-06-11 PROCEDURE — 92507 TX SP LANG VOICE COMM INDIV: CPT

## 2025-06-11 PROCEDURE — 97129 THER IVNTJ 1ST 15 MIN: CPT

## 2025-06-11 PROCEDURE — 6360000002 HC RX W HCPCS: Performed by: INTERNAL MEDICINE

## 2025-06-11 PROCEDURE — 97116 GAIT TRAINING THERAPY: CPT

## 2025-06-11 PROCEDURE — 93306 TTE W/DOPPLER COMPLETE: CPT | Performed by: INTERNAL MEDICINE

## 2025-06-11 PROCEDURE — 94761 N-INVAS EAR/PLS OXIMETRY MLT: CPT

## 2025-06-11 PROCEDURE — 92526 ORAL FUNCTION THERAPY: CPT

## 2025-06-11 RX ORDER — LISINOPRIL 10 MG/1
10 TABLET ORAL DAILY
Status: DISCONTINUED | OUTPATIENT
Start: 2025-06-12 | End: 2025-06-12

## 2025-06-11 RX ADMIN — SODIUM CHLORIDE, PRESERVATIVE FREE 10 ML: 5 INJECTION INTRAVENOUS at 08:47

## 2025-06-11 RX ADMIN — ATORVASTATIN CALCIUM 80 MG: 80 TABLET, FILM COATED ORAL at 20:53

## 2025-06-11 RX ADMIN — BACLOFEN 10 MG: 10 TABLET ORAL at 20:53

## 2025-06-11 RX ADMIN — ASPIRIN 81 MG: 81 TABLET, CHEWABLE ORAL at 08:44

## 2025-06-11 RX ADMIN — PANTOPRAZOLE SODIUM 40 MG: 40 TABLET, DELAYED RELEASE ORAL at 08:43

## 2025-06-11 RX ADMIN — SODIUM CHLORIDE, PRESERVATIVE FREE 5 ML: 5 INJECTION INTRAVENOUS at 20:54

## 2025-06-11 RX ADMIN — ENOXAPARIN SODIUM 40 MG: 100 INJECTION SUBCUTANEOUS at 20:54

## 2025-06-11 ASSESSMENT — PAIN SCALES - GENERAL
PAINLEVEL_OUTOF10: 0

## 2025-06-11 NOTE — PROGRESS NOTES
Facility/Department: [address] ICU   DYSPHAGIA THERAPY and SPEECH / LANGUAGE / COGNITIVE-LINGUISTIC TREATMENT    Patient: Darshana Grace   : 1945   MRN: [id number]      Treatment Date: 2025      Admitting Dx:   Stroke-like symptoms [R29.90]   has a past medical history of Arthritis, Bell's palsy, Cerebral artery occlusion with cerebral infarction (HCC), CKD (chronic kidney disease), Claustrophobia, Diabetes mellitus (HCC), Esophageal reflux, blood clots, Hyperlipidemia, Hypertension, Kidney stone, Nervous, Pulmonary embolism (HCC), and Stuttering.   has a past surgical history that includes Hysterectomy; Appendectomy; Cardiac catheterization; Cholecystectomy; Colonoscopy; Tonsillectomy; knee surgery (Right); blepharoplasty (Bilateral, 2021); Abdomen surgery; and Endoscopy, colon, diagnostic.  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 MD ADMISSION H&P HPI: \"Darshana Grace is a 79 y.o. female with a pmh of CVA, bells palsy, dysarthria brought to hospital with strokelike symptoms.  Patient apparently did not show to the OR today, coworkers checked on her at home and he found to be confused, noted worsening speech compared to baseline and agitation.  Brought to hospital

## 2025-06-11 NOTE — PROGRESS NOTES
SLP NOTE    Treatment/follow-up attempted. Diagnostics at bedside. ST to re-attempt as schedule permits unless otherwise notified.    Thank you.  Eusebia Kennedy MA, CCC-SLP, #[id number]  Speech-Language Pathologist  Portable phone: [phone]

## 2025-06-11 NOTE — PLAN OF CARE
Problem: Pain  Goal: Verbalizes/displays adequate comfort level or baseline comfort level  6/10/2025 2334 by Lion Mendez RN  Outcome: Progressing  Flowsheets (Taken 6/10/2025 2000)  Verbalizes/displays adequate comfort level or baseline comfort level:   Encourage patient to monitor pain and request assistance   Assess pain using appropriate pain scale   Administer analgesics based on type and severity of pain and evaluate response   Implement non-pharmacological measures as appropriate and evaluate response  6/10/2025 1511 by Bridget Gore RN  Outcome: Progressing     Problem: Chronic Conditions and Co-morbidities  Goal: Patient's chronic conditions and co-morbidity symptoms are monitored and maintained or improved  6/10/2025 2334 by Lion Mendez RN  Outcome: Progressing  6/10/2025 1511 by Bridget Gore RN  Outcome: Progressing  Flowsheets (Taken 6/10/2025 0800)  Care Plan - Patient's Chronic Conditions and Co-Morbidity Symptoms are Monitored and Maintained or Improved:   Monitor and assess patient's chronic conditions and comorbid symptoms for stability, deterioration, or improvement   Collaborate with multidisciplinary team to address chronic and comorbid conditions and prevent exacerbation or deterioration     Problem: Discharge Planning  Goal: Discharge to home or other facility with appropriate resources  6/10/2025 2334 by Lion Mendez RN  Outcome: Progressing  6/10/2025 1511 by Bridget Gore RN  Outcome: Progressing  Flowsheets (Taken 6/10/2025 0800)  Discharge to home or other facility with appropriate resources: Identify barriers to discharge with patient and caregiver     Problem: Safety - Adult  Goal: Free from fall injury  6/10/2025 2334 by Lion Mendez RN  Outcome: Progressing  6/10/2025 1511 by Bridget Gore RN  Outcome: Progressing     Problem: Skin/Tissue Integrity  Goal: Skin integrity remains intact  Description: 1.  Monitor for areas of redness and/or skin breakdown2.   Assess vascular access sites hourly3.  Every 4-6 hours minimum:  Change oxygen saturation probe site4.  Every 4-6 hours:  If on nasal continuous positive airway pressure, respiratory therapy assess nares and determine need for appliance change or resting period  6/10/2025 2334 by Lion Mendez RN  Outcome: Progressing  6/10/2025 1511 by Bridget Gore RN  Outcome: Progressing  Flowsheets (Taken 6/10/2025 0800)  Skin Integrity Remains Intact: Monitor for areas of redness and/or skin breakdown     Problem: Neurosensory - Adult  Goal: Achieves stable or improved neurological status  6/10/2025 2334 by Lion Mendez RN  Outcome: Progressing  Flowsheets (Taken 6/10/2025 2000)  Achieves stable or improved neurological status:   Assess for and report changes in neurological status   Maintain blood pressure and fluid volume within ordered parameters to optimize cerebral perfusion and minimize risk of hemorrhage   Monitor temperature, glucose, and sodium. Initiate appropriate interventions as ordered  6/10/2025 1511 by Bridget Gore RN  Outcome: Progressing  Flowsheets (Taken 6/10/2025 0800)  Achieves stable or improved neurological status:   Monitor temperature, glucose, and sodium. Initiate appropriate interventions as ordered   Maintain blood pressure and fluid volume within ordered parameters to optimize cerebral perfusion and minimize risk of hemorrhage   Assess for and report changes in neurological status  Goal: Absence of seizures  6/10/2025 2334 by Lion Mendez RN  Outcome: Progressing  Flowsheets (Taken 6/10/2025 2000)  Absence of seizures:   Monitor for seizure activity.  If seizure occurs, document type and location of movements and any associated apnea   If seizure occurs, turn head to side and suction secretions as needed  6/10/2025 1511 by Bridget Gore RN  Outcome: Progressing  Flowsheets (Taken 6/10/2025 0800)  Absence of seizures: Monitor for seizure activity.  If seizure occurs, document type

## 2025-06-11 NOTE — PROGRESS NOTES
NAME:  Darshana Grace  YOB: 1945  MEDICAL RECORD NUMBER:  [id number]    Shift Summary: Patient tolerates soft bite sized diet per speech, downgraded to medsurge tele, patient ambulates with pt/ot with walk x1 assist, tearful at bedside requesting to leave AMA but kept for AMS, refuses purewich use, echo completed and ARU consulted, pending placement, psych consult completed    Family updated: Yes:  at bedside    Rhythm: Sinus Tachycardia  vs sinus     Most recent vitals:   Visit Vitals  BP (!) 140/85   Pulse (!) 105   Temp 98.8 °F (37.1 °C)   Resp 27   Ht 1.702 m (5' 7\")   Wt 69.9 kg (154 lb)   SpO2 97%   BMI 24.12 kg/m²           No data found.    No data found.      Respiratory support needed (if any):  - RA    Admission weight Weight - Scale: 72 kg (158 lb 11.7 oz) (06/09/25 1250)    Today's weight    Wt Readings from Last 1 Encounters:   06/11/25 69.9 kg (154 lb)        Gama need assessed each shift: N/A - no gama present  UOP >30ml/hr: YES  Last documented BM (in last 48 hrs):  Patient Vitals for the past 48 hrs:   Last BM (including prior to admit) Stool Occurrence   06/10/25 0759 06/10/25 1   06/10/25 1205 06/10/25 --   06/10/25 1330 06/10/25 1   06/10/25 1600 06/10/25 --   06/11/25 0630 06/11/25 1   06/11/25 1007 06/11/25 --                Restraints (in use currently or dc'd in last 12 hrs): No    Order current and documentation up to date? No    Lines/Drains reviewed @ bedside.  Peripheral IV 06/09/25 Right Antecubital (Active)   Number of days: 2         Drip rates at handoff:    sodium chloride         Lab Data:   CBC:   Recent Labs     06/09/25  1243 06/10/25  0439   WBC 7.7 7.5   HGB 15.5 15.5   HCT 45.4 46.1   MCV 91.4 93.0    216     BMP:    Recent Labs     06/09/25  1243 06/10/25  0439    141   K 3.9 3.8   CO2 26 24   BUN 14 17   CREATININE 0.9 0.9     ABG: No results for input(s): \"PHART\", \"[id number]\", \"PO2ART\" in the last 72 hours.    Any consults during the  shift? Yes: Consults received: : rehab    Any signed and held orders to be released?  No        4 Eyes Skin Assessment       The patient is being assessed for  Shift Handoff    I agree that at least one RN has performed a thorough Head to Toe Skin Assessment on the patient. ALL assessment sites listed below have been assessed.      Areas assessed by both nurses: Head, Face, Ears, Shoulders, Back, Chest, Arms, Elbows, Hands, Sacrum. Buttock, Coccyx, Ischium, Legs. Feet and Heels, and Under Medical Devices         Does the Patient have a Wound? No noted wound(s)    Wound Care Orders initiated or active by RN: No       Alhaji Prevention initiated or active: No    Pressure Injury (Stage 3,4, Unstageable, DTI, NWPT, and Complex wounds): No  If present, place \"IP Wound Care/Ostomy Nurse Eval and Treat\" in : No    Ostomies present: No  If new Ostomy, place \"IP Wound Care/Ostomy Nurse Eval and Treat\" in : No     Nurse 1 eSignature: Electronically signed by Eloisa Winter RN on 6/11/25 at 5:37 PM EDT    **SHARE this note so that the co-signing nurse can place an eSignature**    Nurse 2 eSignature: {Esignature:[id number]}

## 2025-06-11 NOTE — PROGRESS NOTES
V2.0    [facility] Progress Note      Name:  Darshana Grace /Age/Sex: 1945  (79 y.o. female)   MRN & CSN:  [id number] & [id number] Encounter Date/Time: 2025 12:30 PM EDT   Location:  [id number] PCP: Eugenia Lamb MD     Attending:Phuong Bains MD       Hospital Day: 3    Assessment and Recommendations   Darshana Grace is a 79 y.o. female with a pmh of bells palsy, chronic dysarthria brought to hospital with strokelike symptoms.     Plan:   L PCA stroke. Patient presenting with confusion, worsening dysarthria, and agitation. CT head with age-indeterminate infarct involving left occipital lobe.  CTA head and neck complete occlusion of the left posterior cerebral artery and focal stenosis of the right posterior cerebral artery.  MRI-brain confirms acute infarction in the left thalamus and left occipital lobe.  Continue aspirin, statin.  Patient was noncompliant with her medications prior to presenting hospital.  2D echo ordered will follow-up. Seen by PT, OT, waiting for ARU evaluation.    History of Bell's palsy with chronic speech deficit  Depression, patient reported not taking any of her depression medications.  Seen by telepsychiatry here.  Recommended to start on lamotrigine 25 mg at night and sertraline 25 mg daily but patient is refusing this starts medications.  GERD, continue PPI  Hyperlipidemia, on Lipitor  Hypertension, restart low dose lisinopril tomorrow morning  Overactive bladder, patient reported not taking oxybutynin anymore  History of DVT, treated with Coumadin in the past, currently not on Coumadin      Diet ADULT DIET; Dysphagia - Minced and Moist   DVT Prophylaxis [x] Lovenox, []  Heparin, [] SCDs, [] Ambulation,  [] Eliquis, [] Xarelto  [] Coumadin   Code Status Full Code             Personally reviewed Lab Studies and Imaging   EKG rhythm strip reviewed showing first-degree AV block  Medical Decision Making:  The following items were considered in medical  NECK: AORTIC ARCH/ARCH VESSELS: No dissection or arterial injury.  No significant stenosis of the brachiocephalic or subclavian arteries. CAROTID ARTERIES: No dissection, arterial injury, or hemodynamically significant stenosis by NASCET criteria. VERTEBRAL ARTERIES: No dissection, arterial injury, or significant stenosis. SOFT TISSUES: The lung apices are clear.  No cervical or superior mediastinal lymphadenopathy.  The larynx and pharynx are unremarkable.  No acute abnormality of the salivary and thyroid glands. BONES: No acute osseous abnormality. CTA HEAD: ANTERIOR CIRCULATION: No significant stenosis of the intracranial internal carotid, anterior cerebral, or middle cerebral arteries. No aneurysm. POSTERIOR CIRCULATION: Complete occlusion of left posterior cerebral artery at the P1/P2 junction.  Focal stenosis is present at the right posterior cerebral artery at the proximal P2 segment.  This is of questionable hemodynamic significance.  Otherwise, no significant stenosis of the basilar or posterior cerebral arteries. No aneurysm. OTHER: No dural venous sinus thrombosis on this non-dedicated study. Incidental note of a subcentimeter epidermal inclusion cyst at the high right posterolateral scalp.     1. No acute intracranial abnormality. 2. Complete occlusion of the left posterior cerebral artery at the P1/P2 junction. 3. Focal stenosis of the right posterior cerebral artery at the proximal P2 segment. This is of questionable hemodynamic significance. 4. No significant stenosis of the cervical carotid or vertebral arteries.     CTA HEAD NECK W CONTRAST  Result Date: 6/9/2025  EXAMINATION: CTA OF THE HEAD AND NECK WITH CONTRAST; CT OF THE HEAD WITHOUT CONTRAST 6/9/2025 10:14 pm: TECHNIQUE: CTA of the head and neck was performed with the administration of intravenous contrast. Multiplanar reformatted images are provided for review.  MIP images are provided for review. Stenosis of the internal carotid arteries

## 2025-06-11 NOTE — PROGRESS NOTES
Spiritual Health History and Assessment/Progress Note  [facility]    (P) Spiritual/Emotional Needs,  ,  ,      Name: Darshana Grace MRN: [id number]    Age: 79 y.o.     Sex: female   Language: English   Yazidi: Mandaen   Stroke-like symptoms     Date: 6/11/2025            Total Time Calculated: (P) 13 min              Spiritual Assessment began in [facility] ICU        Referral/Consult From: (P) Nurse   Encounter Overview/Reason: (P) Spiritual/Emotional Needs  Service Provided For: (P) Patient    Daisy, Belief, Meaning:   Patient is connected with a daisy tradition or spiritual practice  Family/Friends No family/friends present      Importance and Influence:  Patient has spiritual/personal beliefs that influence decisions regarding their health  Family/Friends No family/friends present    Community:  Patient feels well-supported. Support system includes: Spouse/Partner, Children, and Extended family  Family/Friends No family/friends present    Assessment and Plan of Care:     Patient Interventions include: Facilitated expression of thoughts and feelings, Engaged in theological reflection, and Other: provided blessing, patient is on communion list  Family/Friends Interventions include: No family/friends present    Patient Plan of Care: Spiritual Care available upon further referral  Family/Friends Plan of Care: No family/friends present    Electronically signed by Chaplain Marco on 6/11/2025 at 12:16 PM

## 2025-06-11 NOTE — PROGRESS NOTES
Physical Therapy  Second Session    Pt agreeable to additional activity.      Pt required Min A to move supine > sit.  Stood with CGA, then ambulated 30' x 3 in sotelo with CGA, slow speed, shuffling gait, mild instability with turns.  Pt practiced forward/backward walking 10' x 3, unsupported, experiencing consistent posterior LOB during backward walking, Min A to correct balance. Pt completed turn-in-place clockwise x 2, counter-clockwise x 2, several LOB, Min A to correct, cues to slow speed of movement.  She returned to room and took seated rest.  She practiced standing, ambulating to commode, completing toilet transfer, and washing hands at sink.  She was able to do so with verbal cues, CGA, no overt LOB.  She struggled to recall how to use soap dispenser, and to remember to rub her hands when completing hand washing.  She returned to bedside chair.      I educated her and her friends/family that I am concerned that she will struggle to manage her own care at home d/t her decreased balance, decreased coordination, and inability to complete self-care tasks on her own.  I recommend a bout of high-frequency therapy to address these deficits.  This afternoon, she is more intent upon returning directly home.  I encouraged her to find initial 24/7 assist if she plan on doing this.  She states that this level of assist is not available.      Pt positioned for comfort in bedside chair, alarm in place, call light in reach.     Time In: 1458   Time coded tx: 23 min.     Electronically signed by Tejas Boland, PT [id number] on 6/11/2025 at 3:25 PM

## 2025-06-11 NOTE — PLAN OF CARE
Problem: Pain  Goal: Verbalizes/displays adequate comfort level or baseline comfort level  6/11/2025 1111 by Madhuri Cintron RN  Outcome: Progressing  6/10/2025 2334 by Lion Mendez RN  Outcome: Progressing  Flowsheets (Taken 6/10/2025 2000)  Verbalizes/displays adequate comfort level or baseline comfort level:   Encourage patient to monitor pain and request assistance   Assess pain using appropriate pain scale   Administer analgesics based on type and severity of pain and evaluate response   Implement non-pharmacological measures as appropriate and evaluate response     Problem: Chronic Conditions and Co-morbidities  Goal: Patient's chronic conditions and co-morbidity symptoms are monitored and maintained or improved  6/11/2025 1111 by Madhuri Cintron RN  Outcome: Progressing  6/10/2025 2334 by Lion Mendez RN  Outcome: Progressing     Problem: Discharge Planning  Goal: Discharge to home or other facility with appropriate resources  6/11/2025 1111 by Madhuri Cintron RN  Outcome: Progressing  6/10/2025 2334 by Lion Mendez RN  Outcome: Progressing     Problem: Safety - Adult  Goal: Free from fall injury  6/11/2025 1111 by Madhuri Cintron RN  Outcome: Progressing  6/10/2025 2334 by Lion Mendez RN  Outcome: Progressing     Problem: Skin/Tissue Integrity  Goal: Skin integrity remains intact  Description: 1.  Monitor for areas of redness and/or skin breakdown2.  Assess vascular access sites hourly3.  Every 4-6 hours minimum:  Change oxygen saturation probe site4.  Every 4-6 hours:  If on nasal continuous positive airway pressure, respiratory therapy assess nares and determine need for appliance change or resting period  6/11/2025 1111 by Madhuri Cintron RN  Outcome: Progressing  6/10/2025 2334 by Lion Mendez RN  Outcome: Progressing     Problem: Neurosensory - Adult  Goal: Achieves stable or improved neurological status  6/11/2025 1111 by Madhuri Cintron RN  Outcome: Progressing  6/10/2025 2334 by

## 2025-06-11 NOTE — PROGRESS NOTES
NAME:  Darshana Grace  YOB: 1945  MEDICAL RECORD NUMBER:  [id number]    Shift Summary: Uneventful night. NIHSS unchanged 1 large loose BM, GI panel sent.    Family updated: Yes:  POA at bedside    Rhythm: Normal Sinus Rhythm  BBB    Most recent vitals:   Visit Vitals  BP (!) 152/85   Pulse 94   Temp 97.8 °F (36.6 °C) (Oral)   Resp 21   Ht 1.702 m (5' 7\")   Wt 70.1 kg (154 lb 8.7 oz)   SpO2 98%   BMI 24.20 kg/m²           No data found.    No data found.      Respiratory support needed (if any):  - RA    Admission weight Weight - Scale: 72 kg (158 lb 11.7 oz) (06/09/25 1250)    Today's weight    Wt Readings from Last 1 Encounters:   06/10/25 70.1 kg (154 lb 8.7 oz)        Gama need assessed each shift: N/A - no gama present  UOP >30ml/hr: YES  Last documented BM (in last 48 hrs):  Patient Vitals for the past 48 hrs:   Last BM (including prior to admit) Stool Occurrence   06/10/25 0759 06/10/25 1   06/10/25 1205 06/10/25 --   06/10/25 1330 06/10/25 1   06/10/25 1600 06/10/25 --   06/11/25 0630 06/11/25 1                Restraints (in use currently or dc'd in last 12 hrs): No    Order current and documentation up to date? Yes    Lines/Drains reviewed @ bedside.  Peripheral IV 06/09/25 Right Antecubital (Active)   Number of days: 1         Drip rates at handoff:    sodium chloride         Lab Data:   CBC:   Recent Labs     06/09/25  1243 06/10/25  0439   WBC 7.7 7.5   HGB 15.5 15.5   HCT 45.4 46.1   MCV 91.4 93.0    216     BMP:    Recent Labs     06/09/25  1243 06/10/25  0439    141   K 3.9 3.8   CO2 26 24   BUN 14 17   CREATININE 0.9 0.9     ABG: No results for input(s): \"PHART\", \"JMT1IAG\", \"PO2ART\" in the last 72 hours.    Any consults during the shift? No    Any signed and held orders to be released?  No        4 Eyes Skin Assessment       The patient is being assessed for  Shift Handoff    I agree that at least one RN has performed a thorough Head to Toe Skin Assessment on the

## 2025-06-11 NOTE — PROGRESS NOTES
Accepted for admission to ARU pending insurance approval and medical readiness. Prior auth initiated through [employer] for medical mutual for admission to ARU. Pending auth #[id number] . Clinicals uploaded to portal. Will monitor for changes and update CM and Md as needed.

## 2025-06-11 NOTE — PROGRESS NOTES
Neurology Progress Note    Updates  No acute events overnight.   She appears stable.      Medical History:  Past Medical History:   Diagnosis Date    Arthritis     Bell's palsy     Cerebral artery occlusion with cerebral infarction (HCC)     CKD (chronic kidney disease)     Claustrophobia     Diabetes mellitus (HCC)     Esophageal reflux     Hx of blood clots     X 14    Hyperlipidemia     Hypertension     Kidney stone     Nervous     Pulmonary embolism (HCC)     Stuttering      Past Surgical History:   Procedure Laterality Date    ABDOMEN SURGERY      APPENDECTOMY      BLEPHAROPLASTY Bilateral 03/01/2021    BILATERAL UPPER LID BLEPHAROPLASTY performed by Flakita Hull MD at [facility]    CARDIAC CATHETERIZATION      CHOLECYSTECTOMY      COLONOSCOPY      ENDOSCOPY, COLON, DIAGNOSTIC      HYSTERECTOMY (CERVIX STATUS UNKNOWN)      KNEE SURGERY Right     TONSILLECTOMY       Scheduled Meds:   [START ON 6/12/2025] lisinopril  10 mg Oral Daily    baclofen  10 mg Oral Nightly    pantoprazole  40 mg Oral Daily    sodium chloride flush  5-40 mL IntraVENous 2 times per day    enoxaparin  40 mg SubCUTAneous Nightly    atorvastatin  80 mg Oral Nightly    aspirin  81 mg Oral Daily    Or    aspirin  300 mg Rectal Daily     Medications Prior to Admission:   baclofen (LIORESAL) 10 MG tablet, Take 1 tablet by mouth at bedtime  dicyclomine (BENTYL) 10 MG capsule, Take 1 capsule by mouth 3 times daily as needed (spasms)  omeprazole (PRILOSEC) 40 MG delayed release capsule, Take 1 capsule by mouth daily (Patient not taking: Reported on 6/9/2025)  lamoTRIgine (LAMICTAL) 25 MG tablet, Take 25 mg by mouth 2 times daily (Patient not taking: Reported on 6/9/2025)  sertraline (ZOLOFT) 50 MG tablet, Take 1 tablet by mouth daily (Patient not taking: Reported on 6/9/2025)  atorvastatin (LIPITOR) 20 MG tablet, Take 1 tablet by mouth daily (Patient not taking: Reported on 6/9/2025)  benazepril (LOTENSIN) 40 MG tablet, Take 40 mg by

## 2025-06-11 NOTE — PROGRESS NOTES
[facility] - Physical Therapy   Phone: [phone]    Physical Therapy  Facility/Department:[address] ICU    [] Initial Evaluation            [x] Daily Treatment Note         [] Discharge Summary      Patient: Darshana Grace   : 1945   MRN: [id number]   Date of Service:  2025  Staff Mobility Recommendation: RW x 1    AM-PAC score:   Discharge Recommendations: ARU    Admitting Diagnosis: Stroke-like symptoms  Ordering Physician: Phuong Bains MD on 25  Current Admission Summary: \"Darshana Grace is a 79 y.o. female presenting to the ED for altered mental status.  Patient denies show to work today therefore a welfare check was done.  Patient was found disoriented with inability to follow commands and answer questions appropriately.  Patient has a past medical history of Bell's palsy and stroke with residual lower face droop.  Patient does have dysarthria at baseline however family states her dysarthria is worse compared to her baseline.  Patient denies chest pain or shortness of breath.  Patient is agitated and noncompliant with examination and interview.  Last well-known was potentially sometime yesterday.\"     Past Medical History:  has a past medical history of Arthritis, Bell's palsy, Cerebral artery occlusion with cerebral infarction (HCC), CKD (chronic kidney disease), Claustrophobia, Diabetes mellitus (HCC), Esophageal reflux, Hx of blood clots, Hyperlipidemia, Hypertension, Kidney stone, Nervous, Pulmonary embolism (HCC), and Stuttering.  Past Surgical History:  has a past surgical history that includes Hysterectomy; Appendectomy; Cardiac catheterization; Cholecystectomy; Colonoscopy; Tonsillectomy; knee surgery (Right); blepharoplasty (Bilateral, 2021); Abdomen surgery; and Endoscopy, colon, diagnostic.    Assessment  Activity Tolerance: Fair  Impairments Requiring Therapeutic Intervention: decreased functional mobility, decreased ADL status, decreased safety

## 2025-06-11 NOTE — PROGRESS NOTES
Received message from medical Bee that there is an intent to deny. Secure message sent to update MD. Dr. Bains confirmed she will attempt peer to peer for ARU admission. Message that was received: A Peer to Peer is available and would need to be completed by the current level of care provider, by the end of day 6/16/2025. A Peer to Peer can be requested by the current level of care provider calling [phone] option 1, option 2. A Peer to Peer beyond this date will not change the decision. Due to our scheduling process, we may not be able to accommodate scheduling same day peer to peers.  CM updated.  Electronically signed by Salud Lazaro RN on 6/11/2025 at 3:36 PM

## 2025-06-12 PROCEDURE — 97530 THERAPEUTIC ACTIVITIES: CPT

## 2025-06-12 PROCEDURE — 97535 SELF CARE MNGMENT TRAINING: CPT

## 2025-06-12 PROCEDURE — 94760 N-INVAS EAR/PLS OXIMETRY 1: CPT

## 2025-06-12 PROCEDURE — 6370000000 HC RX 637 (ALT 250 FOR IP): Performed by: INTERNAL MEDICINE

## 2025-06-12 PROCEDURE — 97130 THER IVNTJ EA ADDL 15 MIN: CPT

## 2025-06-12 PROCEDURE — 2500000003 HC RX 250 WO HCPCS: Performed by: INTERNAL MEDICINE

## 2025-06-12 PROCEDURE — 94761 N-INVAS EAR/PLS OXIMETRY MLT: CPT

## 2025-06-12 PROCEDURE — 99233 SBSQ HOSP IP/OBS HIGH 50: CPT | Performed by: NURSE PRACTITIONER

## 2025-06-12 PROCEDURE — 1200000000 HC SEMI PRIVATE

## 2025-06-12 PROCEDURE — 97129 THER IVNTJ 1ST 15 MIN: CPT

## 2025-06-12 PROCEDURE — 6360000002 HC RX W HCPCS: Performed by: INTERNAL MEDICINE

## 2025-06-12 RX ORDER — LAMOTRIGINE 25 MG/1
25 TABLET ORAL NIGHTLY
Status: DISCONTINUED | OUTPATIENT
Start: 2025-06-12 | End: 2025-06-18 | Stop reason: HOSPADM

## 2025-06-12 RX ORDER — SERTRALINE HYDROCHLORIDE 25 MG/1
25 TABLET, FILM COATED ORAL DAILY
Status: DISCONTINUED | OUTPATIENT
Start: 2025-06-12 | End: 2025-06-13

## 2025-06-12 RX ORDER — LISINOPRIL 5 MG/1
5 TABLET ORAL DAILY
Status: DISCONTINUED | OUTPATIENT
Start: 2025-06-13 | End: 2025-06-18 | Stop reason: HOSPADM

## 2025-06-12 RX ADMIN — BACLOFEN 10 MG: 10 TABLET ORAL at 22:04

## 2025-06-12 RX ADMIN — ATORVASTATIN CALCIUM 80 MG: 80 TABLET, FILM COATED ORAL at 22:04

## 2025-06-12 RX ADMIN — SERTRALINE 25 MG: 25 TABLET, FILM COATED ORAL at 12:23

## 2025-06-12 RX ADMIN — PANTOPRAZOLE SODIUM 40 MG: 40 TABLET, DELAYED RELEASE ORAL at 09:17

## 2025-06-12 RX ADMIN — SODIUM CHLORIDE, PRESERVATIVE FREE 10 ML: 5 INJECTION INTRAVENOUS at 09:21

## 2025-06-12 RX ADMIN — ASPIRIN 81 MG: 81 TABLET, CHEWABLE ORAL at 09:17

## 2025-06-12 RX ADMIN — SODIUM CHLORIDE, PRESERVATIVE FREE 10 ML: 5 INJECTION INTRAVENOUS at 22:08

## 2025-06-12 RX ADMIN — LAMOTRIGINE 25 MG: 25 TABLET ORAL at 22:04

## 2025-06-12 RX ADMIN — LISINOPRIL 10 MG: 10 TABLET ORAL at 09:17

## 2025-06-12 RX ADMIN — ENOXAPARIN SODIUM 40 MG: 100 INJECTION SUBCUTANEOUS at 22:04

## 2025-06-12 ASSESSMENT — PAIN SCALES - GENERAL: PAINLEVEL_OUTOF10: 0

## 2025-06-12 NOTE — PROGRESS NOTES
NAME:  Darshana Grace  YOB: 1945  MEDICAL RECORD NUMBER:  [id number]    Shift Summary: Patient verbalized suicidal ideation to speech therapist and this RN, provider Dr. Bains ordered for psych hold, patients POA updated, requires sitter for constant monitoring, psych consult completed again, patient refusing oral diet at times, advanced to easy to chew,     Family updated: Yes:  POA andre    Rhythm: Sinus Tachycardia  vs sinus    Most recent vitals:   Visit Vitals  /77   Pulse 98   Temp 98.6 °F (37 °C) (Axillary)   Resp 20   Ht 1.702 m (5' 7\")   Wt 68 kg (149 lb 14.6 oz)   SpO2 100%   BMI 23.48 kg/m²           No data found.    No data found.      Respiratory support needed (if any):  - RA    Admission weight Weight - Scale: 72 kg (158 lb 11.7 oz) (06/09/25 1250)    Today's weight    Wt Readings from Last 1 Encounters:   06/12/25 68 kg (149 lb 14.6 oz)        Gama need assessed each shift: N/A - no gama present  UOP >30ml/hr: NO - patient refused purewick but actively voiding  Last documented BM (in last 48 hrs):  Patient Vitals for the past 48 hrs:   Last BM (including prior to admit) Stool Occurrence   06/11/25 0630 06/11/25 1   06/11/25 1007 06/11/25 1   06/11/25 1600 -- 2   06/11/25 1959 06/11/25 --   06/11/25 2100 06/11/25 1                Restraints (in use currently or dc'd in last 12 hrs): No    Order current and documentation up to date? No    Lines/Drains reviewed @ bedside.  Peripheral IV 06/09/25 Right Antecubital (Active)   Number of days: 3         Drip rates at handoff:    sodium chloride         Lab Data:   CBC:   Recent Labs     06/10/25  0439   WBC 7.5   HGB 15.5   HCT 46.1   MCV 93.0        BMP:    Recent Labs     06/10/25  0439      K 3.8   CO2 24   BUN 17   CREATININE 0.9     ABG: No results for input(s): \"PHART\", \"[id number]\", \"PO2ART\" in the last 72 hours.    Any consults during the shift? Yes: Consults received: : psych    Any signed and held orders

## 2025-06-12 NOTE — PROGRESS NOTES
Department of Physical Medicine & Rehabilitation  Progress Note    Patient Identification:  Darshana Grace  [id number]  : 1945  Admit date: 2025    Chief Complaint: Stroke-like symptoms    Subjective:   No acute events overnight.   Patient seen this am resting in bed. Reports difficulty with speech. She denies weakness, numbness/tingling, vision impairment but does recognize that she is having difficulty with her functioning. She is in agreement with plan for rehab if approved.   Labs reviewed.     ROS: No f/c, n/v, cp     Objective:  Patient Vitals for the past 24 hrs:   BP Temp Temp src Pulse Resp SpO2 Height Weight   25 0609 -- -- -- -- -- -- -- 68 kg (149 lb 14.6 oz)   25 0400 121/73 99.1 °F (37.3 °C) Axillary 56 14 98 % -- --   25 0031 (!) 149/78 98.2 °F (36.8 °C) Oral 67 16 97 % -- --   25 2319 -- -- -- 74 21 99 % -- --   25 -- -- -- (!) 106 23 96 % -- --   25 -- -- -- (!) 106 -- -- -- --   25 1959 (!) 148/93 97.6 °F (36.4 °C) Oral (!) 103 -- 97 % -- --   25 1716 (!) 140/85 98.8 °F (37.1 °C) -- (!) 105 27 97 % -- --   25 1514 (!) 152/85 -- -- -- -- -- 1.702 m (5' 7\") 69.9 kg (154 lb)   25 1400 (!) 154/86 -- -- (!) 108 22 97 % -- --   25 1200 137/83 98.7 °F (37.1 °C) -- 95 21 98 % -- --   25 1112 -- -- Oral (!) 101 20 97 % -- --   25 1100 135/79 -- -- 99 22 97 % -- --   25 1007 (!) 117/105 -- -- (!) 123 -- 99 % -- --   25 0900 (!) 159/86 -- -- 98 -- 100 % -- --     Const: Alert. No distress, pleasant.   HEENT: Normocephalic, atraumatic. Normal sclera/conjunctiva. MMM.   CV: Regular rate and rhythm.   Resp: No respiratory distress. Lungs CTAB.   Abd: Soft, nontender, nondistended, NABS+   Ext: No edema.   Neuro: Alert, oriented to person, place. Speech dysarthric and effortful. Right VF deficit, Left facial droop. Strength overall intact, ? Right inattention/coordination deficit.   Psych:  8.18     E/E' Lateral 7.83     E/E' Septal 8.54     LA Volume Index BP 22 16 - 34 ml/m2    LVOT Stroke Volume Index 27.6 mL/m2    LA Volume Index MOD A2C 27 16 - 34 ml/m2    LA Volume Index MOD A4C 16 16 - 34 ml/m2    RA Volume Index A4C 11 mL/m2    Ao Root Index 1.93 cm/m2    Ascending Aorta Index 1.66 cm/m2    AV Velocity Ratio 0.69     LVOT:AV VTI Index 0.65     JOSE CARLOS/BSA VTI 1.2 cm2/m2    JOSE CARLOS/BSA Peak Velocity 1.2 cm2/m2    RVSP 15 mmHg    EF Physician 58 %       Therapy progress:  Physical therapy:  Bed Mobility:     Sit>supine:     Supine>sit:     Transfers:     Sit>stand:     Stand>sit:     Bed<>chair     Stand Pivot:     Lateral transfer:     Car transfer:     Ambulation:     Stairs:     Curb:     Wheelchair:       Occupational therapy:   Feeding     Grooming/Oral Hygiene     UE Bathing     LE Bathing     UE Dressing     LE Dressing     Putting On/Taking Off Footwear     Toileting       Speech therapy:    ADULT DIET; Dysphagia - Soft and Bite Sized        Body mass index is 23.48 kg/m².    Assessment:   Acute ischemic left PCA stroke  Left PCA occlusion  Right PCA stenosis  Dysphagia  HTN  HLD  CKD  H/o PE/DVT -previously completed warfarin  GERD  H/o Bell's palsy (Left)  Depression  Overactive bladder    Impairments: Right VF deficit, Right neglect, balance, cognition, dysphagia, dysarthria, baseline left facial droop    Recommendations:    Updated therapy notes reviewed.  Overall min assist with physical therapy.  Multiple losses of balance noted.    Patient remains candidate for ARU, pending [id number] with insurance.     Thank you for this consult.  Please contact me with any questions.      Paty Fowler MD 6/12/2025, 8:35 AM

## 2025-06-12 NOTE — PROGRESS NOTES
Facility/Department: [address] ICU   DYSPHAGIA THERAPY and SPEECH / LANGUAGE / COGNITIVE-LINGUISTIC TREATMENT    Patient: Darshana Grace   : 1945   MRN: [id number]      Treatment Date: 2025      Admitting Dx:   Stroke-like symptoms [R29.90]   has a past medical history of Arthritis, Bell's palsy, Cerebral artery occlusion with cerebral infarction (HCC), CKD (chronic kidney disease), Claustrophobia, Diabetes mellitus (HCC), Esophageal reflux, blood clots, Hyperlipidemia, Hypertension, Kidney stone, Nervous, Pulmonary embolism (HCC), and Stuttering.   has a past surgical history that includes Hysterectomy; Appendectomy; Cardiac catheterization; Cholecystectomy; Colonoscopy; Tonsillectomy; knee surgery (Right); blepharoplasty (Bilateral, 2021); Abdomen surgery; and Endoscopy, colon, diagnostic.  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 MD ADMISSION H&P HPI: \"Darshana Grace is a 79 y.o. female with a pmh of CVA, bells palsy, dysarthria brought to hospital with strokelike symptoms.  Patient apparently did not show to the OR today, coworkers checked on her at home and he found to be confused, noted worsening speech compared to baseline and agitation.  Brought to hospital  co-morbidities, severity of impairments, medical dx, psych comorbidities      RECOMMENDATIONS     Recommended Diet and Intervention:  Diet Solids Recommendation:  Easy to Chew texture Liquid Consistency Recommendation:  Thin liquids   Recommended form of Meds:   PO per patient preference      Compensatory Swallowing Strategies:  Eat or Feed Slowly, Remain Upright 30-45 min , Small Bites and Sips , Upright as possible with all PO intake     Eating Assistance Recommendation: Setup or clean-up assistance    Continued Treatment recommendations: Receptive/Expressive Language, Cognitive-Linguistic, Dysphagia  Frequency of treatment: 2-5 times/week    Continued Dysphagia Therapeutic Intervention: Bolus Control Exercise, Diet Tolerance Monitoring , Patient/Family Education , Therapeutic Trials with SLP   Continued SLP Therapeutic Intervention: Cognitive-Linguistic, Cognitive-communication    Anticipated Discharge Recommendations:  Recommend ongoing SLP for speech and dysphagia therapy upon discharge from hospital       GOALS / PLAN     GOALS:  SHORT TERM DYSPHAGIA GOALS  Pt will functionally tolerate recommended diet with no overt clinical s/s of aspiration continue  Pt will advance to least restrictive diet as indicated  continue  Patient will demonstrate carryover of specific compensatory swallow strategies (ie, postures, techniques,...) with set-up continue  The patient will improve oral preparation phase via bolus control/manipulation exercises 15/15 trials continue  SHORT TERM SPEECH/LANGUAGE/COGNITIVE GOALS  Patient will demonstrate improved intelligibility of multi-syllable utterances, via oral motor and oral motor speech coordination exercises, with intermittent clarification cues dc goal; patient at baseline  Patient will demonstrate functional   processing for graded yes/no questions, wh?, and/or commands with mild cues continue  Patient will demonstrate improved communicative effectiveness for daily needs

## 2025-06-12 NOTE — CONSULTS
Received request for Telepsychiatry evaluation.  HS unavailable to move cart for 1/2 hour, due to other duties

## 2025-06-12 NOTE — VIRTUAL HEALTH
Darshana Grace  [id number]  1945       INPATIENT TELEPSYCHIATRY REASSESSMENT    06/12/25    History  From: patient, chart, nursing staff    Chief Complaint:  Jimmy Amin RN, patient now reporting she stopped taking her psychotropic medications with intent to die prior to admission to the hospital and is again reporting desire for death with no known plan/intent    HPI:   This is a 79 y.o. female patient who is being seen for a reassessment.    The agrees to an interview.  She presents much differently today in blue paper scrubs, sitting hunched in a chair and wearing glasses.  She presents with significant cognitive deficits, which she did not demonstrate yesterday.  She accurately identifies herself and her date of birth.  When asked what month it is, she responds \"February\".  Initially, she identifies the day of the week as \"the seventh\".  When again asked to identify which day of the week it was and given examples, such as Saturday, Tuesday, Friday, she responds \"Thursday\".  She reports that the year is 1967.  When asked what her location is, she responds \"the office\".  She is unable to name the city that she is in.    Today, she reports suicidal ideation.  She does not describe a plan, but she does voice intent.  Yesterday, the patient denied that she stopped taking her medications prior to hospitalization.  Today, she admits to doing so with the hope that she would stop breathing and die.  When asked if she would take her medication moving forward, she responds \"if it helps\".  When asked if her medications were previously helpful for her, she reports that her blood pressure medications were.  She reports that she saw \"no difference\" with her psychiatric medications.  She reports that she stopped taking her medications when she ran out of them and decided to see how she would do without them.  Eventually, however, she tells me she ultimately took them so that she would stop breathing.  When the  vertebral arteries.         XR ABDOMEN (KUB) (SINGLE AP VIEW)   Final Result   1. No acute findings.            XR CHEST PORTABLE   Final Result   No acute process.         CT HEAD WO CONTRAST   Final Result   Addendum (preliminary) 1 of 1   ADDENDUM #1    ADDENDUM:      Findings communicated by a member of the core team to  Dr Valente on    06/09/2025 at 12:32 pm.   ----------------------------------------------------         Final   1. No acute intracranial abnormality.   2. Chronic microvascular ischemic changes.   3. Minimal global parenchymal volume loss.   4. Atherosclerosis of the intracranial vasculature.   These results were sent to the core team on 06/09/2025 by 12:29 pm to be communicated to the referring/covering healthcare provider/office.            CTA HEAD NECK W CONTRAST   Final Result   1. Occlusion of the left posterior cerebral artery at the level of the P1 segment.    2. Severe focal stenosis of the right P2 segment.   3. Otherwise, no significant stenosis of the intracranial vasculature.    4. No flow limiting  stenosis of the cervical carotids/vertebral arteries.    5. Questionable age indeterminate infarct involving the left occipital lobe. No mass effect or midline shift. No evidence of an acute intracranial hemorrhage.      Findings were discussed with Dr. Valente on 06/09/2025 at 1:05PM              Radiology:  Echo (TTE) complete (PRN contrast/bubble/strain/3D)  Result Date: 6/11/2025    Left Ventricle: Normal left ventricular systolic function with a visually estimated EF of 55 - 60%. Left ventricle size is normal. Normal wall thickness. Normal wall motion. Grade I diastolic dysfunction with normal LAP.   Right Ventricle: Right ventricle size is normal. Normal systolic function.   Mitral Valve: Thickened leaflets. Calcified leaflets. No regurgitation. No stenosis noted.   Image quality is technically difficult. Procedure performed with the patient in a supine position.     MRI brain

## 2025-06-12 NOTE — PROGRESS NOTES
Occupational Therapy    [facility] - Occupational Therapy   Phone: [phone]    Occupational Therapy  Facility/Department:[address] ICU    [] Initial Evaluation            [x] Daily Treatment Note         [] Discharge Summary      Patient: Darshana Grace   : 1945   MRN: [id number]   Date of Service:  2025    Staff Mobility Recommendation: RW x1 with gait belt    AM-PAC Score:   Discharge Recommendations: ARU, 5-7x/week    Admitting Diagnosis:  Stroke-like symptoms  Ordering Physician: Dr. Bains  Current Admission Summary: Per H&P note on , \"Darshana Grace is a 79 y.o. female with a pmh of CVA, bells palsy, dysarthria brought to hospital with strokelike symptoms.\"    Past Medical History:  has a past medical history of Arthritis, Bell's palsy, Cerebral artery occlusion with cerebral infarction (HCC), CKD (chronic kidney disease), Claustrophobia, Diabetes mellitus (HCC), Esophageal reflux, Hx of blood clots, Hyperlipidemia, Hypertension, Kidney stone, Nervous, Pulmonary embolism (HCC), and Stuttering.  Past Surgical History:  has a past surgical history that includes Hysterectomy; Appendectomy; Cardiac catheterization; Cholecystectomy; Colonoscopy; Tonsillectomy; knee surgery (Right); blepharoplasty (Bilateral, 2021); Abdomen surgery; and Endoscopy, colon, diagnostic.    Assessment  Activity Tolerance: Good  Impairments Requiring Therapeutic Intervention: decreased functional mobility, decreased ADL status, decreased strength, decreased safety awareness, decreased cognition, decreased endurance, decreased balance, decreased fine motor control, decreased coordination  Prognosis: good    Clinical Assessment: PTA, pt living at home alone where she is typically independent with ADLs and fxl mobility without device. This date, pt functioning below baseline mostly limited due to decreased strength, balance and coordination. Pt completes fxl transfers CGA and fxl mobility

## 2025-06-12 NOTE — PROGRESS NOTES
V2.0    [facility] [place]      Name:  Darshana Grace /Age/Sex: 1945  (79 y.o. female)   MRN & CSN:  [id number] & [id number] Encounter Date/Time: 2025 12:30 PM EDT   Location:  [id number] PCP: Eugenia Lamb MD     Attending:Phuong Bains MD       Hospital Day: 4    Assessment and Recommendations   Darshana Grace is a 79 y.o. female with a pmh of bells palsy, chronic dysarthria brought to hospital with strokelike symptoms.  Diagnosed with left PCA stroke.  Undergoing evaluation for ARU placement.    Plan:   L PCA stroke. Patient presenting with confusion, worsening dysarthria, and agitation. CT head with age-indeterminate infarct involving left occipital lobe.  CTA head and neck complete occlusion of the left posterior cerebral artery and focal stenosis of the right posterior cerebral artery.  MRI-brain confirms acute infarction in the left thalamus and left occipital lobe.  Continue aspirin, statin.  Patient was noncompliant with her medications prior to presenting hospital.  Cardiogram reviewed showing normal LV function.  Seen by PT, OT, accepted from ARU but requiring peer to peer for insurance purposes.  Peer to peer to be scheduled for tomorrow.  History of Bell's palsy with chronic speech deficit, continue supportive care  Depression, patient reported not taking any of her depression medications.  Seen by telepsychiatry here.  Recommended to start on lamotrigine 25 mg at night and sertraline 25 mg daily but patient is refusing this starts medications.  Will attempt to started today if patient agrees due to mood fluctuations and overall concern for ongoing depression.  Fluctuating blood pressure, started on lisinopril 10 mg, continue close monitoring blood pressure and adjust medication as needed.  GERD, continue PPI  Hyperlipidemia, on Lipitor  Overactive bladder, patient reported not taking oxybutynin anymore  History of DVT, treated with Coumadin in the past,  Value Date/Time    CHOL 199 06/10/2025 04:39 AM    HDL 72 06/10/2025 04:39 AM    TRIG 94 06/10/2025 04:39 AM     Hemoglobin A1C:   Lab Results   Component Value Date/Time    LABA1C 5.2 06/10/2025 04:39 AM     TSH: No results found for: \"TSH\"  Troponin: No results found for: \"TROPONINT\"  Lactic Acid: No results for input(s): \"LACTA\" in the last 72 hours.  BNP: No results for input(s): \"PROBNP\" in the last 72 hours.  UA:  Lab Results   Component Value Date/Time    NITRU Negative 06/09/2025 03:03 PM    COLORU Yellow 06/09/2025 03:03 PM    PHUR 8.0 06/09/2025 03:03 PM    WBCUA 5 06/09/2025 03:03 PM    RBCUA 0 06/09/2025 03:03 PM    BACTERIA None Seen 06/09/2025 03:03 PM    CLARITYU Clear 06/09/2025 03:03 PM    LEUKOCYTESUR TRACE 06/09/2025 03:03 PM    UROBILINOGEN 0.2 06/09/2025 03:03 PM    BILIRUBINUR Negative 06/09/2025 03:03 PM    BLOODU Negative 06/09/2025 03:03 PM    GLUCOSEU Negative 06/09/2025 03:03 PM    KETUA Negative 06/09/2025 03:03 PM     Urine Cultures: No results found for: \"LABURIN\"  Blood Cultures: No results found for: \"BC\"  No results found for: \"BLOODCULT2\"  Organism: No results found for: \"ORG\"      Electronically signed by Phuong Bains MD on 6/12/2025 at 11:56 AM  Comment: Please note this report has been produced using speech recognition software and may contain errors related to that system including errors in grammar, punctuation, and spelling, as well as words and phrases that may be inappropriate. If there are any questions or concerns, please feel free to contact the dictating provider for clarification.

## 2025-06-12 NOTE — PROGRESS NOTES
Physical Therapy  Attempt    Pt working with SLP.     Electronically signed by Tejas Boland, PT [id number] on 6/12/2025 at 2:38 PM

## 2025-06-12 NOTE — PROGRESS NOTES
Shift Summary    VSS. NIHSS unchanged. Patient denies any needs at this time. Call light within reach.     Admission Diagnosis: Stroke-like symptoms    Shift Events:    Vitals:  Vitals:    06/11/25 2319 06/12/25 0031 06/12/25 0400 06/12/25 0609   BP:  (!) 149/78 121/73    Pulse: 74 67 56    Resp: 21 16 14    Temp:  98.2 °F (36.8 °C) 99.1 °F (37.3 °C)    TempSrc:  Oral Axillary    SpO2: 99% 97% 98%    Weight:    68 kg (149 lb 14.6 oz)   Height:            room air    WEIGHT: Admit Weight - Scale: 72 kg (158 lb 11.7 oz)      Today  Weight - Scale: 68 kg (149 lb 14.6 oz)    Tele:  normal sinus     IV/Line:  Peripheral IV 06/09/25 Right Antecubital (Active)   Site Assessment Clean, dry & intact 06/12/25 0605   Line Status Normal saline locked;Capped 06/12/25 0605   Line Care Connections checked and tightened 06/12/25 0605   Phlebitis Assessment No symptoms 06/12/25 0605   Infiltration Assessment 0 06/12/25 0605   Alcohol Cap Used Yes 06/12/25 0605   Dressing Status Clean, dry & intact 06/12/25 0605   Dressing Type Transparent 06/12/25 0605          Drains/Haq:  External Urinary Catheter (Active)   Site Assessment Clean,dry & intact 06/11/25 2100   Placement Replaced 06/11/25 2100   Securement Method Securing device (Describe) 06/11/25 2100   Catheter Care Catheter/Wick replaced;Suction Canister/Tubing changed 06/11/25 2100   Perineal Care Yes 06/11/25 2100   Suction 40 mmgHg continuous 06/11/25 2100   Urine Color Yellow 06/11/25 1600   Urine Appearance Clear 06/11/25 1600   Urine Odor Other (Comment) 06/10/25 1601   Output (mL) 100 mL 06/11/25 0630       Output by Drain (mL) 06/10/25 0701 - 06/10/25 1900 06/10/25 1901 - 06/11/25 0700 06/11/25 0701 - 06/11/25 1900 06/11/25 1901 - 06/12/25 0636   Requested LDAs do not have output data documented.       Neuro:   disoriented and only aware of  place and person    I/O:   No intake/output data recorded.

## 2025-06-12 NOTE — PROGRESS NOTES
This RN informed by speech therapist Ethel of patients suicidal ideation without a plan, patient's friend and POA Mary states \"this is an ongoing issue, she's told me that before too\" Patient placed in suicidal precautions, provider Dr. Bains and supervisor Nika notified.

## 2025-06-12 NOTE — PLAN OF CARE
Problem: Pain  Goal: Verbalizes/displays adequate comfort level or baseline comfort level  Outcome: Progressing  Flowsheets (Taken 6/12/2025 0417)  Verbalizes/displays adequate comfort level or baseline comfort level:   Encourage patient to monitor pain and request assistance   Assess pain using appropriate pain scale     Problem: Safety - Adult  Goal: Free from fall injury  Outcome: Progressing  Flowsheets (Taken 6/12/2025 0417)  Free From Fall Injury: Instruct family/caregiver on patient safety     Problem: Skin/Tissue Integrity  Goal: Skin integrity remains intact  Description: 1.  Monitor for areas of redness and/or skin breakdown2.  Assess vascular access sites hourly3.  Every 4-6 hours minimum:  Change oxygen saturation probe site4.  Every 4-6 hours:  If on nasal continuous positive airway pressure, respiratory therapy assess nares and determine need for appliance change or resting period  Outcome: Progressing  Flowsheets (Taken 6/12/2025 0417)  Skin Integrity Remains Intact: Monitor for areas of redness and/or skin breakdown     Problem: Neurosensory - Adult  Goal: Achieves stable or improved neurological status  Outcome: Progressing  Flowsheets (Taken 6/12/2025 0417)  Achieves stable or improved neurological status: Assess for and report changes in neurological status

## 2025-06-12 NOTE — PROGRESS NOTES
Neurology Progress Note    Updates  No acute events overnight.   No specific neurologic complaints.   Want to go home.     Medical History:  Past Medical History:   Diagnosis Date    Arthritis     Bell's palsy     Cerebral artery occlusion with cerebral infarction (HCC)     CKD (chronic kidney disease)     Claustrophobia     Diabetes mellitus (HCC)     Esophageal reflux     Hx of blood clots     X 14    Hyperlipidemia     Hypertension     Kidney stone     Nervous     Pulmonary embolism (HCC)     Stuttering      Past Surgical History:   Procedure Laterality Date    ABDOMEN SURGERY      APPENDECTOMY      BLEPHAROPLASTY Bilateral 03/01/2021    BILATERAL UPPER LID BLEPHAROPLASTY performed by Flakita Hull MD at [facility]    CARDIAC CATHETERIZATION      CHOLECYSTECTOMY      COLONOSCOPY      ENDOSCOPY, COLON, DIAGNOSTIC      HYSTERECTOMY (CERVIX STATUS UNKNOWN)      KNEE SURGERY Right     TONSILLECTOMY       Scheduled Meds:   lisinopril  10 mg Oral Daily    baclofen  10 mg Oral Nightly    pantoprazole  40 mg Oral Daily    sodium chloride flush  5-40 mL IntraVENous 2 times per day    enoxaparin  40 mg SubCUTAneous Nightly    atorvastatin  80 mg Oral Nightly    aspirin  81 mg Oral Daily    Or    aspirin  300 mg Rectal Daily     Medications Prior to Admission:   baclofen (LIORESAL) 10 MG tablet, Take 1 tablet by mouth at bedtime  dicyclomine (BENTYL) 10 MG capsule, Take 1 capsule by mouth 3 times daily as needed (spasms)  omeprazole (PRILOSEC) 40 MG delayed release capsule, Take 1 capsule by mouth daily (Patient not taking: Reported on 6/9/2025)  lamoTRIgine (LAMICTAL) 25 MG tablet, Take 25 mg by mouth 2 times daily (Patient not taking: Reported on 6/9/2025)  sertraline (ZOLOFT) 50 MG tablet, Take 1 tablet by mouth daily (Patient not taking: Reported on 6/9/2025)  atorvastatin (LIPITOR) 20 MG tablet, Take 1 tablet by mouth daily (Patient not taking: Reported on 6/9/2025)  benazepril (LOTENSIN) 40 MG tablet, Take

## 2025-06-13 LAB
25(OH)D3 SERPL-MCNC: 29.2 NG/ML
TSH SERPL DL<=0.005 MIU/L-ACNC: 1.39 UIU/ML (ref 0.27–4.2)

## 2025-06-13 PROCEDURE — 92526 ORAL FUNCTION THERAPY: CPT

## 2025-06-13 PROCEDURE — 36415 COLL VENOUS BLD VENIPUNCTURE: CPT

## 2025-06-13 PROCEDURE — 1200000000 HC SEMI PRIVATE

## 2025-06-13 PROCEDURE — 2500000003 HC RX 250 WO HCPCS: Performed by: INTERNAL MEDICINE

## 2025-06-13 PROCEDURE — 6360000002 HC RX W HCPCS: Performed by: INTERNAL MEDICINE

## 2025-06-13 PROCEDURE — 84443 ASSAY THYROID STIM HORMONE: CPT

## 2025-06-13 PROCEDURE — 6370000000 HC RX 637 (ALT 250 FOR IP): Performed by: INTERNAL MEDICINE

## 2025-06-13 PROCEDURE — 97129 THER IVNTJ 1ST 15 MIN: CPT

## 2025-06-13 PROCEDURE — 82306 VITAMIN D 25 HYDROXY: CPT

## 2025-06-13 PROCEDURE — 94760 N-INVAS EAR/PLS OXIMETRY 1: CPT

## 2025-06-13 PROCEDURE — 97116 GAIT TRAINING THERAPY: CPT

## 2025-06-13 PROCEDURE — 97130 THER IVNTJ EA ADDL 15 MIN: CPT

## 2025-06-13 PROCEDURE — 9990000010 HC NO CHARGE VISIT

## 2025-06-13 PROCEDURE — 99233 SBSQ HOSP IP/OBS HIGH 50: CPT | Performed by: NURSE PRACTITIONER

## 2025-06-13 RX ORDER — LAMOTRIGINE 25 MG/1
25 TABLET ORAL NIGHTLY
Qty: 30 TABLET | Refills: 3 | Status: SHIPPED | OUTPATIENT
Start: 2025-06-13

## 2025-06-13 RX ORDER — ASPIRIN 81 MG/1
81 TABLET, CHEWABLE ORAL DAILY
Qty: 30 TABLET | Refills: 3 | Status: SHIPPED | OUTPATIENT
Start: 2025-06-14

## 2025-06-13 RX ORDER — LISINOPRIL 5 MG/1
5 TABLET ORAL DAILY
Qty: 30 TABLET | Refills: 3 | Status: SHIPPED | OUTPATIENT
Start: 2025-06-14

## 2025-06-13 RX ORDER — SERTRALINE HYDROCHLORIDE 25 MG/1
25 TABLET, FILM COATED ORAL ONCE
Status: COMPLETED | OUTPATIENT
Start: 2025-06-13 | End: 2025-06-13

## 2025-06-13 RX ORDER — ATORVASTATIN CALCIUM 80 MG/1
80 TABLET, FILM COATED ORAL NIGHTLY
Qty: 30 TABLET | Refills: 3 | Status: SHIPPED | OUTPATIENT
Start: 2025-06-13

## 2025-06-13 RX ADMIN — SERTRALINE 25 MG: 25 TABLET, FILM COATED ORAL at 09:03

## 2025-06-13 RX ADMIN — SODIUM CHLORIDE, PRESERVATIVE FREE 10 ML: 5 INJECTION INTRAVENOUS at 21:43

## 2025-06-13 RX ADMIN — BACLOFEN 10 MG: 10 TABLET ORAL at 21:44

## 2025-06-13 RX ADMIN — ASPIRIN 81 MG: 81 TABLET, CHEWABLE ORAL at 09:03

## 2025-06-13 RX ADMIN — ATORVASTATIN CALCIUM 80 MG: 80 TABLET, FILM COATED ORAL at 21:44

## 2025-06-13 RX ADMIN — LAMOTRIGINE 25 MG: 25 TABLET ORAL at 21:44

## 2025-06-13 RX ADMIN — PANTOPRAZOLE SODIUM 40 MG: 40 TABLET, DELAYED RELEASE ORAL at 09:03

## 2025-06-13 RX ADMIN — LISINOPRIL 5 MG: 5 TABLET ORAL at 09:03

## 2025-06-13 RX ADMIN — SERTRALINE 25 MG: 25 TABLET, FILM COATED ORAL at 14:17

## 2025-06-13 RX ADMIN — ENOXAPARIN SODIUM 40 MG: 100 INJECTION SUBCUTANEOUS at 21:43

## 2025-06-13 ASSESSMENT — PAIN SCALES - GENERAL: PAINLEVEL_OUTOF10: 0

## 2025-06-13 NOTE — DISCHARGE INSTR - COC
Continuity of Care Form    Patient Name: Darshana Grace   :  1945  MRN:  [id number]    Admit date:  2025  Discharge date:  ***    Code Status Order: Full Code   Advance Directives:     Admitting Physician:  Phuong Bains MD  PCP: Eugenia Lamb MD    Discharging Nurse: ***  Discharging Hospital Unit/Room#: E8U-0749/5256-01  Discharging Unit Phone Number: ***    Emergency Contact:   Extended Emergency Contact Information  Primary Emergency Contact: Annabelle Payne  Mobile Phone: [phone]  Relation: Friend  Secondary Emergency Contact: Maddy Fallon  Mobile Phone: [phone]  Relation: Friend    Past Surgical History:  Past Surgical History:   Procedure Laterality Date    ABDOMEN SURGERY      APPENDECTOMY      BLEPHAROPLASTY Bilateral 2021    BILATERAL UPPER LID BLEPHAROPLASTY performed by Flakita Hull MD at [facility]    CARDIAC CATHETERIZATION      CHOLECYSTECTOMY      COLONOSCOPY      ENDOSCOPY, COLON, DIAGNOSTIC      HYSTERECTOMY (CERVIX STATUS UNKNOWN)      KNEE SURGERY Right     TONSILLECTOMY         Immunization History:   Immunization History   Administered Date(s) Administered    COVID-19, MODERNA BLUE border, Primary or Immunocompromised, (age 12y+), IM, 100 mcg/0.5mL 2021, 2021       Active Problems:  Patient Active Problem List   Diagnosis Code    Stroke-like symptoms R29.90       Isolation/Infection:   Isolation            No Isolation          Patient Infection Status    None to display         Nurse Assessment:  Last Vital Signs: /71   Pulse 68   Temp 97.2 °F (36.2 °C) (Oral)   Resp 16   Ht 1.702 m (5' 7\")   Wt 69.5 kg (153 lb 3.5 oz)   SpO2 95%   BMI 24.00 kg/m²     Last documented pain score (0-10 scale): Pain Level: 0  Last Weight:   Wt Readings from Last 1 Encounters:   25 69.5 kg (153 lb 3.5 oz)     Mental Status:  {IP PT MENTAL STATUS:}    IV Access:  { ELLE IV ACCESS:[id number]}    Nursing Mobility/ADLs:  Walking

## 2025-06-13 NOTE — PROGRESS NOTES
[facility] - Physical Therapy   Phone: [phone]    Physical Therapy  Facility/Department:[address] ICU    [] Initial Evaluation            [x] Daily Treatment Note         [] Discharge Summary      Patient: Darshana Grace   : 1945   MRN: [id number]   Date of Service:  2025  Staff Mobility Recommendation: RW x 1 c belt    AM-PAC score:   Discharge Recommendations: ARU    Admitting Diagnosis: Stroke-like symptoms  Ordering Physician: Phuong Bains MD on 25  Current Admission Summary: \"Darshana Grace is a 79 y.o. female presenting to the ED for altered mental status.  Patient denies show to work today therefore a welfare check was done.  Patient was found disoriented with inability to follow commands and answer questions appropriately.  Patient has a past medical history of Bell's palsy and stroke with residual lower face droop.  Patient does have dysarthria at baseline however family states her dysarthria is worse compared to her baseline.  Patient denies chest pain or shortness of breath.  Patient is agitated and noncompliant with examination and interview.  Last well-known was potentially sometime yesterday.\"     Past Medical History:  has a past medical history of Arthritis, Bell's palsy, Cerebral artery occlusion with cerebral infarction (HCC), CKD (chronic kidney disease), Claustrophobia, Diabetes mellitus (HCC), Esophageal reflux, Hx of blood clots, Hyperlipidemia, Hypertension, Kidney stone, Nervous, Pulmonary embolism (HCC), and Stuttering.  Past Surgical History:  has a past surgical history that includes Hysterectomy; Appendectomy; Cardiac catheterization; Cholecystectomy; Colonoscopy; Tonsillectomy; knee surgery (Right); blepharoplasty (Bilateral, 2021); Abdomen surgery; and Endoscopy, colon, diagnostic.    Assessment  Activity Tolerance: Fair  Impairments Requiring Therapeutic Intervention: decreased functional mobility, decreased ADL status, decreased

## 2025-06-13 NOTE — PROGRESS NOTES
NAME:  Darshana Grace  YOB: 1945  MEDICAL RECORD NUMBER:  [id number]    Shift Summary: NIHSS unchanged. Patient downgraded. Admits to suicide ideation- reconsult psych. Psych hold placed, sitter at bedside. Hold off on acute rehab consult and recommending inpatient psych placement.     Family updated: Yes:  friend at bedside    Rhythm: Normal Sinus Rhythm     Most recent vitals:   Visit Vitals  BP (!) 147/85   Pulse 76   Temp 97.8 °F (36.6 °C) (Oral)   Resp 20   Ht 1.702 m (5' 7\")   Wt 68 kg (149 lb 14.6 oz)   SpO2 94%   BMI 23.48 kg/m²           No data found.    No data found.      Respiratory support needed (if any):  - RA    Admission weight Weight - Scale: 72 kg (158 lb 11.7 oz) (06/09/25 1250)    Today's weight    Wt Readings from Last 1 Encounters:   06/12/25 68 kg (149 lb 14.6 oz)        Gama need assessed each shift: N/A - no gama present  UOP >30ml/hr: YES  Last documented BM (in last 48 hrs):  Patient Vitals for the past 48 hrs:   Last BM (including prior to admit) Stool Occurrence   06/11/25 0630 06/11/25 1   06/11/25 1007 06/11/25 1   06/11/25 1600 -- 2   06/11/25 1959 06/11/25 --   06/11/25 2100 06/11/25 1   06/12/25 1000 -- 1                Restraints (in use currently or dc'd in last 12 hrs): No    Order current and documentation up to date? No    Lines/Drains reviewed @ bedside.  Peripheral IV 06/09/25 Right Antecubital (Active)   Number of days: 3         Drip rates at handoff:    sodium chloride         Lab Data:   CBC:   Recent Labs     06/10/25  0439   WBC 7.5   HGB 15.5   HCT 46.1   MCV 93.0        BMP:    Recent Labs     06/10/25  0439      K 3.8   CO2 24   BUN 17   CREATININE 0.9     ABG: No results for input(s): \"PHART\", \"[id number]\", \"PO2ART\" in the last 72 hours.    Any consults during the shift? Yes: Consults received: : re consult psych    Any signed and held orders to be released?  No        4 Eyes Skin Assessment       The patient is being assessed

## 2025-06-13 NOTE — PLAN OF CARE
Problem: Pain  Goal: Verbalizes/displays adequate comfort level or baseline comfort level  Outcome: Progressing     Problem: Chronic Conditions and Co-morbidities  Goal: Patient's chronic conditions and co-morbidity symptoms are monitored and maintained or improved  Outcome: Progressing     Problem: Discharge Planning  Goal: Discharge to home or other facility with appropriate resources  Outcome: Progressing     Problem: Safety - Adult  Goal: Free from fall injury  Outcome: Progressing     Problem: Skin/Tissue Integrity  Goal: Skin integrity remains intact  Description: 1.  Monitor for areas of redness and/or skin breakdown2.  Assess vascular access sites hourly3.  Every 4-6 hours minimum:  Change oxygen saturation probe site4.  Every 4-6 hours:  If on nasal continuous positive airway pressure, respiratory therapy assess nares and determine need for appliance change or resting period  Outcome: Progressing     Problem: Neurosensory - Adult  Goal: Achieves stable or improved neurological status  Outcome: Progressing  Flowsheets (Taken 6/12/2025 2000 by Genoveva Triana RN)  Achieves stable or improved neurological status: Assess for and report changes in neurological status  Goal: Absence of seizures  Outcome: Progressing

## 2025-06-13 NOTE — PROGRESS NOTES
Patient with downgrade order. To be transferred to 5th floor. Patient and visitor updated. Sitter at bedside. Report called to Dontae Mc. All questions answered.

## 2025-06-13 NOTE — CARE COORDINATION
[facility] called and [facility] cannot accept this patient due to her needs.     [facility] is going to check other facilities.     Electronically signed by Jasmyne Jaimes RN on 6/13/2025 at 2:09 PM

## 2025-06-13 NOTE — PROGRESS NOTES
Spoke with [facility] for Psychiatry who notified they were willing to accept patient into their facility. Patient is on an involuntary hold and awaiting placement. BHUMI Hoyos is concerned that the facility is in [place] and is requesting to refuse the transfer. Nursing supervisor notified. BHUMI Hoyos is on her way to the hospital to visit patient and notes she will make a decision after discussing with another family friend. This RN has notified [facility] that a decision will be made tomorrow morning. Placement held for patient until morning. Involuntary hold paperwork faxed to facility. Phone number for facility is [phone] option 1 for admissions. Access center to arrange transportation is [phone] option 4. Will notify nightshift RN. Electronically signed by Alisson Grullon RN on 6/13/2025 at 6:47 PM

## 2025-06-13 NOTE — DISCHARGE SUMMARY
V2.0  Discharge Summary    Name:  Darshana Grace /Age/Sex: 1945 (79 y.o. female)   Admit Date: 2025  Discharge Date: 25    MRN & CSN:  [id number] & [id number] Encounter Date and Time 25 11:42 AM EDT    Attending:  Phuong Bains MD Discharging Provider: Phuong Bains MD       Hospital Course:     Brief HPI: Darshana Grace is a 79 y.o. female with a pmh of bells palsy, associated with chronic dysarthria, depression brought to hospital with strokelike symptoms, patient found to have confusion, worsening dysarthria and agitation on presentation. Diagnosed with left PCA stroke.  In hospital patient reported suicidal ideation, she reported that she stopped taking all of her medication 2 weeks ago to die.  Evaluated by psychiatrist recommended geriatric inpatient psychiatry placement.  Medically stable for transfer to inpatient psychiatry.    Brief Problem Based Course:   L PCA stroke. Patient presenting with confusion, worsening dysarthria, and agitation. CT head with age-indeterminate infarct involving left occipital lobe.  CTA head and neck complete occlusion of the left posterior cerebral artery and focal stenosis of the right posterior cerebral artery.  MRI-brain confirms acute infarction in the left thalamus and left occipital lobe.  Started on aspirin and statin.  Patient noted that she was not taking any of her medication prior to hospitalization.  Echocardiogram completed with normal LV function.  Initially patient thought to be a good candidate for ARU but later patient reported suicidal ideation.  Underwent evaluation by psychiatry who recommended inpatient geriatric psychiatric placement.   History of Bell's palsy with chronic speech deficit, continue supportive care  Depression, started on sertraline 25 mg later increased to 50 mg.  Started on lamotrigine 25 mg per psychiatry recommendation.    Fluctuating blood pressure, started on lisinopril 10 mg resulted with low blood

## 2025-06-13 NOTE — PROGRESS NOTES
Department of Physical Medicine & Rehabilitation  Progress Note    Patient Identification:  Darshana Grace  [id number]  : 1945  Admit date: 2025    Chief Complaint: Stroke-like symptoms    Subjective:   No acute events overnight.   Patient seen this afternoon sitting up in room. Reports ongoing difficulty with speech. Denies weakness, numbness, vision changes, headache.   Labs reviewed.     ROS: No f/c, n/v, cp     Objective:  Patient Vitals for the past 24 hrs:   BP Temp Temp src Pulse Resp SpO2 Weight   25 1220 -- -- -- 89 15 95 % --   25 1213 126/71 98.1 °F (36.7 °C) Oral 83 18 95 % --   25 0900 135/71 -- -- 68 16 95 % --   25 0325 126/66 97.2 °F (36.2 °C) Oral 65 20 95 % 69.5 kg (153 lb 3.5 oz)   25 0008 (!) 122/56 97.5 °F (36.4 °C) Oral 70 20 94 % --   25 2145 (!) 147/85 97.8 °F (36.6 °C) Oral 76 20 94 % --   25 2100 (!) 149/82 -- -- 71 17 -- --   25 2000 (!) 142/101 -- -- 74 19 -- --   25 1935 -- -- -- 59 14 -- --   25 1900 132/71 -- -- 71 18 -- --   25 1800 128/71 -- -- 81 17 -- --   25 1724 -- -- -- 88 15 -- --   25 1708 -- -- -- 98 20 -- --   25 1700 -- -- -- 96 17 -- --     Const: Alert. No distress, pleasant.   HEENT: Normocephalic, atraumatic. Normal sclera/conjunctiva. MMM.   CV: Regular rate and rhythm.   Resp: No respiratory distress. Lungs CTAB.   Abd: Soft, nontender, nondistended, NABS+   Ext: No edema.   Neuro: Alert, oriented to person, place. Speech dysarthric and effortful.  Left facial droop.   Psych: Cooperative, appropriate mood and affect    Laboratory data: Available via EMR.   Last 24 hour lab  Recent Results (from the past 24 hours)   Vitamin D 25 Hydroxy    Collection Time: 25  8:23 AM   Result Value Ref Range    Vit D, 25-Hydroxy 29.2 (L) >=30 ng/mL   TSH reflex to FT4,FT3    Collection Time: 25  8:23 AM   Result Value Ref Range    TSH 1.39 0.27 - 4.20 uIU/mL       Therapy

## 2025-06-13 NOTE — PROGRESS NOTES
Facility/Department: [address] ICU   DYSPHAGIA THERAPY and SPEECH / LANGUAGE / COGNITIVE-LINGUISTIC TREATMENT    Patient: Darshana Grace   : 1945   MRN: [id number]      Treatment Date: 2025      Admitting Dx:   Stroke-like symptoms [R29.90]   has a past medical history of Arthritis, Bell's palsy, Cerebral artery occlusion with cerebral infarction (HCC), CKD (chronic kidney disease), Claustrophobia, Diabetes mellitus (HCC), Esophageal reflux, blood clots, Hyperlipidemia, Hypertension, Kidney stone, Nervous, Pulmonary embolism (HCC), and Stuttering.   has a past surgical history that includes Hysterectomy; Appendectomy; Cardiac catheterization; Cholecystectomy; Colonoscopy; Tonsillectomy; knee surgery (Right); blepharoplasty (Bilateral, 2021); Abdomen surgery; and Endoscopy, colon, diagnostic.  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 MD ADMISSION H&P HPI: \"Darshana Grace is a 79 y.o. female with a pmh of CVA, bells palsy, dysarthria brought to hospital with strokelike symptoms.  Patient apparently did not show to the OR today, coworkers checked on her at home and he found to be confused, noted worsening speech compared to baseline and agitation.  Brought to hospital  comorbidities; medical dx    Dysphagia Prognosis: good, guarded  Barriers to Progress: co-morbidities,    SLP treatment Prognosis: guarded  Barriers to Progress:  co-morbidities, severity of impairments, medical dx, psych comorbidities      RECOMMENDATIONS     Recommended Diet and Intervention:  Diet Solids Recommendation:  Easy to Chew texture Liquid Consistency Recommendation:  Thin liquids   Recommended form of Meds:   PO per patient preference      Compensatory Swallowing Strategies:  Eat or Feed Slowly, Remain Upright 30-45 min , Small Bites and Sips , Upright as possible with all PO intake     Eating Assistance Recommendation: Setup or clean-up assistance    Continued Treatment recommendations: Receptive/Expressive Language, Cognitive-Linguistic, Dysphagia  Frequency of treatment: 2-5 times/week    Continued Dysphagia Therapeutic Intervention: Bolus Control Exercise, Diet Tolerance Monitoring , Patient/Family Education , Therapeutic Trials with SLP   Continued SLP Therapeutic Intervention: Cognitive-Linguistic, Cognitive-communication    Anticipated Discharge Recommendations:  Recommend ongoing SLP for speech and dysphagia therapy upon discharge from hospital       GOALS / PLAN     GOALS:  SHORT TERM DYSPHAGIA GOALS  Pt will functionally tolerate recommended diet with no overt clinical s/s of aspiration continue  Pt will advance to least restrictive diet as indicated  continue  Patient will demonstrate carryover of specific compensatory swallow strategies (ie, postures, techniques,...) with set-up continue  The patient will improve oral preparation phase via bolus control/manipulation exercises 15/15 trials continue  SHORT TERM SPEECH/LANGUAGE/COGNITIVE GOALS  Patient will demonstrate improved intelligibility of multi-syllable utterances, via oral motor and oral motor speech coordination exercises, with intermittent clarification cues dc goal; patient at baseline  Patient will demonstrate functional

## 2025-06-13 NOTE — PROGRESS NOTES
Retail pharmacy has been notified that the patient is either going to a SNF, ARU, or other inpatient facility.     All prescriptions sent to the retail pharmacy for this patient will be kept on profile unless told otherwise.    06/13/25 11:58 AM

## 2025-06-13 NOTE — CARE COORDINATION
Transfer Center Handoff for Behavioral Health Transfers      Patient's Current Location: [address] PROGRESSIVE CARE     Chief Complaint   Patient presents with    Altered Mental Status     In via EMS from home. Pt did not show up for work as scheduled today, employees went to go check on patient and found her at home in an \"altered\" mental state. Pt arrives with symptoms including garbled speech, right sided facial droop and agitation.        Current or History of Violent Behavior: No    Currently in Restraints Now or During this Encounter: No  (Specify if Agitation or self harm is noted in ED?)  If yes, please describe behaviors requiring restraint:             Medical Clearance Documented and Verified in the Chart: Yes    Allergies   Allergen Reactions    Fioricet [Butalbital-Apap-Caffeine]     Fluoxetine     Lyrica [Pregabalin]      goofy    Valacyclovir      fever    Wasp Venom Swelling    Chocolate Other (See Comments)     Pt. Pulls hair out.    Neosporin Original [Bacitracin-Neomycin-Polymyxin] Rash        Can Patient Tolerate Lying Flat:     Able to Perform ADLs:    (Specify if able to ambulate or uses any mobility devices such as cane or walker)  Activity: In bed  Level of Assistance:    Assistive Device: Front wheel walker  Miscellaneous Devices:      LABS    CBC:   Lab Results   Component Value Date/Time    WBC 7.5 06/10/2025 04:39 AM    RBC 4.95 06/10/2025 04:39 AM    HGB 15.5 06/10/2025 04:39 AM    HCT 46.1 06/10/2025 04:39 AM    MCV 93.0 06/10/2025 04:39 AM    MCH 31.4 06/10/2025 04:39 AM    MCHC 33.7 06/10/2025 04:39 AM    RDW 13.6 06/10/2025 04:39 AM     06/10/2025 04:39 AM    MPV 8.3 06/10/2025 04:39 AM     CMP:   Lab Results   Component Value Date/Time     06/10/2025 04:39 AM    K 3.8 06/10/2025 04:39 AM     06/10/2025 04:39 AM    CO2 24 06/10/2025 04:39 AM    BUN 17 06/10/2025 04:39 AM    CREATININE 0.9 06/10/2025 04:39 AM    GFRAA >60 05/03/2010 10:50 AM    AGRATIO 1.7 06/09/2025      Home Medications  Does Patient Have Medications to Bring to the Facility: No  Medications Prior to Admission:   Prior to Admission Medications   Prescriptions Last Dose Informant Patient Reported? Taking?   ALPRAZolam (XANAX) 0.25 MG tablet   Yes No   Sig: Take 0.25 mg by mouth nightly as needed for Sleep.   Cholecalciferol (VITAMIN D) 50 MCG (2000 UT) CAPS capsule Not Taking  Yes No   Sig: Take by mouth   Patient not taking: Reported on 6/9/2025   amitriptyline (ELAVIL) 25 MG tablet   Yes No   Sig: Take 25 mg by mouth nightly Indications: 2 tablets at bedtime   atorvastatin (LIPITOR) 20 MG tablet Not Taking  Yes No   Sig: Take 1 tablet by mouth daily   Patient not taking: Reported on 6/9/2025   baclofen (LIORESAL) 10 MG tablet   Yes Yes   Sig: Take 1 tablet by mouth at bedtime   benazepril (LOTENSIN) 40 MG tablet Not Taking  Yes No   Sig: Take 40 mg by mouth daily   Patient not taking: Reported on 6/9/2025   buPROPion (WELLBUTRIN XL) 300 MG extended release tablet   Yes No   Sig: Take 300 mg by mouth every morning   dicyclomine (BENTYL) 10 MG capsule   Yes No   Sig: Take 1 capsule by mouth 3 times daily as needed (spasms)   lamoTRIgine (LAMICTAL) 25 MG tablet Not Taking  Yes No   Sig: Take 25 mg by mouth 2 times daily   Patient not taking: Reported on 6/9/2025   lansoprazole (PREVACID) 30 MG delayed release capsule   Yes No   Sig: Take 30 mg by mouth daily   omeprazole (PRILOSEC) 40 MG delayed release capsule Not Taking  Yes No   Sig: Take 1 capsule by mouth daily   Patient not taking: Reported on 6/9/2025   oxybutynin (DITROPAN) 5 MG tablet   Yes No   Sig: Take 5 mg by mouth nightly   sertraline (ZOLOFT) 50 MG tablet Not Taking  Yes No   Sig: Take 1 tablet by mouth daily   Patient not taking: Reported on 6/9/2025   topiramate (TOPAMAX) 25 MG tablet   Yes No   Sig: Take 25 mg by mouth 2 times daily   warfarin (COUMADIN) 5 MG tablet   Yes No   Sig: Take 5 mg by mouth Indications: 1.t tablets  5 days a week 2

## 2025-06-13 NOTE — PROGRESS NOTES
Physical Therapy  Daily Treatment Attempt    25    Name: Darshana Grace  : 1945   MRN: [id number]    PT follow-up attempt. Pt currently participating in virtual psychiatry assessment. Will continue to follow and re-attempt as able.     Electronically signed by Priya Ray PT on 2025 at 9:38 AM

## 2025-06-13 NOTE — VIRTUAL HEALTH
monitor    Past Medical History:  Active Ambulatory Problems     Diagnosis Date Noted    No Active Ambulatory Problems     Resolved Ambulatory Problems     Diagnosis Date Noted    No Resolved Ambulatory Problems     Past Medical History:   Diagnosis Date    Arthritis     Bell's palsy     Cerebral artery occlusion with cerebral infarction (HCC)     CKD (chronic kidney disease)     Claustrophobia     Diabetes mellitus (HCC)     Esophageal reflux     Hx of blood clots     Hyperlipidemia     Hypertension     Kidney stone     Nervous     Pulmonary embolism (HCC)     Stuttering        Problem List:   Principal Problem:    Stroke-like symptoms  Resolved Problems:    * No resolved hospital problems. *       Allergies:  Allergies   Allergen Reactions    Cymbalta [Duloxetine Hcl] Nausea Only    Escitalopram      Acts goofy    Fioricet [Butalbital-Apap-Caffeine]     Fluoxetine     Hydrochlorothiazide      sweat    Hydrocodone      goofy    Iron      Upset stomach    Lyrica [Pregabalin]      goofy    Metoprolol Nausea Only    Tramadol      Confusion     Valacyclovir      fever    Wasp Venom Swelling    Abilify [Aripiprazole] Nausea And Vomiting    Chocolate Other (See Comments)     Pt. Pulls hair out.    Neosporin Original [Bacitracin-Neomycin-Polymyxin] Rash        Current medications:    Current Facility-Administered Medications:     lamoTRIgine (LAMICTAL) tablet 25 mg, 25 mg, Oral, Nightly, Phuong Bains MD, 25 mg at 06/12/25 2204    sertraline (ZOLOFT) tablet 25 mg, 25 mg, Oral, Daily, Phuong Bains MD, 25 mg at 06/13/25 0903    lisinopril (PRINIVIL;ZESTRIL) tablet 5 mg, 5 mg, Oral, Daily, Phuong Bains MD, 5 mg at 06/13/25 0903    baclofen (LIORESAL) tablet 10 mg, 10 mg, Oral, Nightly, Phuong Bains MD, 10 mg at 06/12/25 2204    pantoprazole (PROTONIX) tablet 40 mg, 40 mg, Oral, Daily, Phuong Bains MD, 40 mg at 06/13/25 0903    sodium chloride flush 0.9 % injection 5-40 mL, 5-40 mL, IntraVENous, 2 times per day,  arterial injury, or hemodynamically significant stenosis by NASCET criteria. VERTEBRAL ARTERIES: No dissection, arterial injury, or significant stenosis. SOFT TISSUES: The lung apices are clear.  No cervical or superior mediastinal lymphadenopathy.  The larynx and pharynx are unremarkable.  No acute abnormality of the salivary and thyroid glands. BONES: No acute osseous abnormality. CTA HEAD: ANTERIOR CIRCULATION: No significant stenosis of the intracranial internal carotid, anterior cerebral, or middle cerebral arteries. No aneurysm. POSTERIOR CIRCULATION: Complete occlusion of left posterior cerebral artery at the P1/P2 junction.  Focal stenosis is present at the right posterior cerebral artery at the proximal P2 segment.  This is of questionable hemodynamic significance.  Otherwise, no significant stenosis of the basilar or posterior cerebral arteries. No aneurysm. OTHER: No dural venous sinus thrombosis on this non-dedicated study. Incidental note of a subcentimeter epidermal inclusion cyst at the high right posterolateral scalp.     1. No acute intracranial abnormality. 2. Complete occlusion of the left posterior cerebral artery at the P1/P2 junction. 3. Focal stenosis of the right posterior cerebral artery at the proximal P2 segment. This is of questionable hemodynamic significance. 4. No significant stenosis of the cervical carotid or vertebral arteries.     XR ABDOMEN (KUB) (SINGLE AP VIEW)  Result Date: 6/9/2025  EXAM: 1 VIEW XRAY OF THE ABDOMEN SUPINE 06/09/2025 03:20:33 PM COMPARISON: None available. CLINICAL HISTORY: Abdominal dyscomfort. FINDINGS: BOWEL: Nonobstructive bowel gas pattern. No significant stool. PERITONEUM AND SOFT TISSUES: No abnormal calcifications. BONES: No acute osseous abnormality. BLADDER: Contrast within the bladder.     1. No acute findings.     CT HEAD WO CONTRAST  Addendum Date: 6/9/2025  ADDENDUM #1  ADDENDUM: Findings communicated by a member of the core team to  Dr Valente  PAST SURGICAL HISTORY:  Pacemaker     S/P hip replacement     S/P hysterectomy

## 2025-06-14 ENCOUNTER — APPOINTMENT (OUTPATIENT)
Dept: CT IMAGING | Age: 80
DRG: 064 | End: 2025-06-14
Payer: MEDICARE

## 2025-06-14 LAB
ANION GAP SERPL CALCULATED.3IONS-SCNC: 13 MMOL/L (ref 3–16)
BACTERIA URNS QL MICRO: ABNORMAL /HPF
BASE EXCESS BLDV CALC-SCNC: -1.8 MMOL/L
BASE EXCESS BLDV CALC-SCNC: -1.8 MMOL/L
BASOPHILS # BLD: 0.1 K/UL (ref 0–0.2)
BASOPHILS NFR BLD: 0.8 %
BILIRUB UR QL STRIP.AUTO: NEGATIVE
BUN SERPL-MCNC: 45 MG/DL (ref 7–20)
CALCIUM SERPL-MCNC: 9.5 MG/DL (ref 8.3–10.6)
CHLORIDE SERPL-SCNC: 103 MMOL/L (ref 99–110)
CLARITY UR: CLEAR
CO2 BLDV-SCNC: 25 MMOL/L
CO2 BLDV-SCNC: 25 MMOL/L
CO2 SERPL-SCNC: 21 MMOL/L (ref 21–32)
COHGB MFR BLDV: 1.6 %
COHGB MFR BLDV: 1.7 %
COLOR UR: YELLOW
CREAT SERPL-MCNC: 1.5 MG/DL (ref 0.6–1.2)
DEPRECATED RDW RBC AUTO: 13.3 % (ref 12.4–15.4)
EOSINOPHIL # BLD: 0 K/UL (ref 0–0.6)
EOSINOPHIL NFR BLD: 0.1 %
EPI CELLS #/AREA URNS AUTO: 8 /HPF (ref 0–5)
GFR SERPLBLD CREATININE-BSD FMLA CKD-EPI: 35 ML/MIN/{1.73_M2}
GLUCOSE SERPL-MCNC: 109 MG/DL (ref 70–99)
GLUCOSE UR STRIP.AUTO-MCNC: NEGATIVE MG/DL
HCO3 BLDV-SCNC: 24 MMOL/L (ref 23–29)
HCO3 BLDV-SCNC: 24 MMOL/L (ref 23–29)
HCT VFR BLD AUTO: 44.9 % (ref 36–48)
HGB BLD-MCNC: 15.1 G/DL (ref 12–16)
HGB UR QL STRIP.AUTO: NEGATIVE
HYALINE CASTS #/AREA URNS AUTO: 0 /LPF (ref 0–8)
KETONES UR STRIP.AUTO-MCNC: ABNORMAL MG/DL
LEUKOCYTE ESTERASE UR QL STRIP.AUTO: ABNORMAL
LYMPHOCYTES # BLD: 1.1 K/UL (ref 1–5.1)
LYMPHOCYTES NFR BLD: 12.1 %
MAGNESIUM SERPL-MCNC: 2.19 MG/DL (ref 1.8–2.4)
MCH RBC QN AUTO: 30.7 PG (ref 26–34)
MCHC RBC AUTO-ENTMCNC: 33.6 G/DL (ref 31–36)
MCV RBC AUTO: 91.5 FL (ref 80–100)
METHGB MFR BLDV: 0.5 %
METHGB MFR BLDV: 0.7 %
MONOCYTES # BLD: 0.7 K/UL (ref 0–1.3)
MONOCYTES NFR BLD: 7.4 %
NEUTROPHILS # BLD: 7.5 K/UL (ref 1.7–7.7)
NEUTROPHILS NFR BLD: 79.6 %
NITRITE UR QL STRIP.AUTO: NEGATIVE
O2 THERAPY: ABNORMAL
O2 THERAPY: NORMAL
PCO2 BLDV: 43.2 MMHG (ref 40–50)
PCO2 BLDV: 43.8 MMHG (ref 40–50)
PH BLDV: 7.35 [PH] (ref 7.35–7.45)
PH BLDV: 7.35 [PH] (ref 7.35–7.45)
PH UR STRIP.AUTO: 5.5 [PH] (ref 5–8)
PLATELET # BLD AUTO: 235 K/UL (ref 135–450)
PMV BLD AUTO: 9 FL (ref 5–10.5)
PO2 BLDV: 56 MMHG
PO2 BLDV: 59 MMHG
POTASSIUM SERPL-SCNC: 3.2 MMOL/L (ref 3.5–5.1)
PROT UR STRIP.AUTO-MCNC: ABNORMAL MG/DL
RBC # BLD AUTO: 4.91 M/UL (ref 4–5.2)
RBC CLUMPS #/AREA URNS AUTO: 1 /HPF (ref 0–4)
SAO2 % BLDV: 90 %
SAO2 % BLDV: 91 %
SODIUM SERPL-SCNC: 137 MMOL/L (ref 136–145)
SP GR UR STRIP.AUTO: 1.02 (ref 1–1.03)
UA COMPLETE W REFLEX CULTURE PNL UR: YES
UA DIPSTICK W REFLEX MICRO PNL UR: YES
URN SPEC COLLECT METH UR: ABNORMAL
UROBILINOGEN UR STRIP-ACNC: 0.2 E.U./DL
WBC # BLD AUTO: 9.4 K/UL (ref 4–11)
WBC #/AREA URNS AUTO: 46 /HPF (ref 0–5)

## 2025-06-14 PROCEDURE — 81001 URINALYSIS AUTO W/SCOPE: CPT

## 2025-06-14 PROCEDURE — 36415 COLL VENOUS BLD VENIPUNCTURE: CPT

## 2025-06-14 PROCEDURE — 87077 CULTURE AEROBIC IDENTIFY: CPT

## 2025-06-14 PROCEDURE — 94761 N-INVAS EAR/PLS OXIMETRY MLT: CPT

## 2025-06-14 PROCEDURE — 1200000000 HC SEMI PRIVATE

## 2025-06-14 PROCEDURE — 94760 N-INVAS EAR/PLS OXIMETRY 1: CPT

## 2025-06-14 PROCEDURE — 87086 URINE CULTURE/COLONY COUNT: CPT

## 2025-06-14 PROCEDURE — 80048 BASIC METABOLIC PNL TOTAL CA: CPT

## 2025-06-14 PROCEDURE — 2500000003 HC RX 250 WO HCPCS: Performed by: INTERNAL MEDICINE

## 2025-06-14 PROCEDURE — 87186 SC STD MICRODIL/AGAR DIL: CPT

## 2025-06-14 PROCEDURE — 85025 COMPLETE CBC W/AUTO DIFF WBC: CPT

## 2025-06-14 PROCEDURE — 6360000002 HC RX W HCPCS: Performed by: STUDENT IN AN ORGANIZED HEALTH CARE EDUCATION/TRAINING PROGRAM

## 2025-06-14 PROCEDURE — 70450 CT HEAD/BRAIN W/O DYE: CPT

## 2025-06-14 PROCEDURE — 6360000002 HC RX W HCPCS: Performed by: INTERNAL MEDICINE

## 2025-06-14 PROCEDURE — 83735 ASSAY OF MAGNESIUM: CPT

## 2025-06-14 PROCEDURE — 6370000000 HC RX 637 (ALT 250 FOR IP): Performed by: INTERNAL MEDICINE

## 2025-06-14 PROCEDURE — 2580000003 HC RX 258: Performed by: STUDENT IN AN ORGANIZED HEALTH CARE EDUCATION/TRAINING PROGRAM

## 2025-06-14 PROCEDURE — 2500000003 HC RX 250 WO HCPCS: Performed by: STUDENT IN AN ORGANIZED HEALTH CARE EDUCATION/TRAINING PROGRAM

## 2025-06-14 PROCEDURE — 82803 BLOOD GASES ANY COMBINATION: CPT

## 2025-06-14 RX ORDER — SODIUM CHLORIDE, SODIUM LACTATE, POTASSIUM CHLORIDE, CALCIUM CHLORIDE 600; 310; 30; 20 MG/100ML; MG/100ML; MG/100ML; MG/100ML
INJECTION, SOLUTION INTRAVENOUS CONTINUOUS
Status: ACTIVE | OUTPATIENT
Start: 2025-06-14 | End: 2025-06-15

## 2025-06-14 RX ADMIN — SODIUM CHLORIDE, PRESERVATIVE FREE 10 ML: 5 INJECTION INTRAVENOUS at 23:32

## 2025-06-14 RX ADMIN — SODIUM CHLORIDE, SODIUM LACTATE, POTASSIUM CHLORIDE, AND CALCIUM CHLORIDE: .6; .31; .03; .02 INJECTION, SOLUTION INTRAVENOUS at 15:22

## 2025-06-14 RX ADMIN — ASPIRIN 300 MG: 300 SUPPOSITORY RECTAL at 12:08

## 2025-06-14 RX ADMIN — ENOXAPARIN SODIUM 40 MG: 100 INJECTION SUBCUTANEOUS at 23:32

## 2025-06-14 RX ADMIN — WATER 1000 MG: 1 INJECTION INTRAMUSCULAR; INTRAVENOUS; SUBCUTANEOUS at 18:34

## 2025-06-14 NOTE — PROGRESS NOTES
Limited head to toe assessment completed as patient sleeping this morning and not fully participating, see flowsheet for findings. VSS. Breathing even and unlarboed on room air. Standard safety and suicide precautions in place. Care on going.   Electronically signed by Norman Vaz RN on 6/14/2025 at 8:59 AM

## 2025-06-14 NOTE — PROGRESS NOTES
Patient remained lethargic this shift. Patient responds to sternal rub and verbalizes pain when stuck for new iv. Urine was sent this shift and uti was found, iv antibiotics and iv fluids started. Patient's head ct scan repeated this shift.  Brain mri and neuro consult placed. Md aware of patient's condition.   Electronically signed by Norman Vaz RN on 6/14/2025 at 7:22 PM

## 2025-06-14 NOTE — PROGRESS NOTES
Patient very sleepy this morning, unable to give morning meds. She responds to sternal rub and occasionally will nod appropriately. Message sent to Dr. Garcia, new orders received.   Electronically signed by Norman Vaz RN on 6/14/2025 at 10:45 AM

## 2025-06-14 NOTE — CARE COORDINATION
Discharge Planning:  SW spoke with RN- [facility] is still looking for inpatient Psych bed for patient.

## 2025-06-14 NOTE — PLAN OF CARE
Problem: Pain  Goal: Verbalizes/displays adequate comfort level or baseline comfort level  6/14/2025 0901 by Norman Vaz RN  Outcome: Progressing  6/13/2025 2355 by Evelyne Wagner RN  Outcome: Progressing  Flowsheets (Taken 6/13/2025 1954)  Verbalizes/displays adequate comfort level or baseline comfort level: Encourage patient to monitor pain and request assistance     Problem: Chronic Conditions and Co-morbidities  Goal: Patient's chronic conditions and co-morbidity symptoms are monitored and maintained or improved  6/14/2025 0901 by Norman Vaz RN  Outcome: Progressing  6/13/2025 2355 by Evelyne Wagner RN  Outcome: Progressing  Flowsheets (Taken 6/13/2025 1954)  Care Plan - Patient's Chronic Conditions and Co-Morbidity Symptoms are Monitored and Maintained or Improved: Monitor and assess patient's chronic conditions and comorbid symptoms for stability, deterioration, or improvement     Problem: Discharge Planning  Goal: Discharge to home or other facility with appropriate resources  6/14/2025 0901 by Norman Vaz RN  Outcome: Progressing  6/13/2025 2355 by Evelyne Wagner RN  Outcome: Progressing  Flowsheets (Taken 6/13/2025 1954)  Discharge to home or other facility with appropriate resources: Identify barriers to discharge with patient and caregiver     Problem: Safety - Adult  Goal: Free from fall injury  6/14/2025 0901 by Norman Vaz RN  Outcome: Progressing  6/13/2025 2355 by Evelyne Wagner RN  Outcome: Progressing     Problem: Skin/Tissue Integrity  Goal: Skin integrity remains intact  Description: 1.  Monitor for areas of redness and/or skin breakdown2.  Assess vascular access sites hourly3.  Every 4-6 hours minimum:  Change oxygen saturation probe site4.  Every 4-6 hours:  If on nasal continuous positive airway pressure, respiratory therapy assess nares and determine need for appliance change or resting period  6/14/2025 0901 by Norman Vaz RN  Outcome:

## 2025-06-14 NOTE — PROGRESS NOTES
pm: TECHNIQUE: CTA of the head and neck was performed with the administration of intravenous contrast. Multiplanar reformatted images are provided for review.  MIP images are provided for review. Stenosis of the internal carotid arteries measured using NASCET criteria. Automated exposure control, iterative reconstruction, and/or weight based adjustment of the mA/kV was utilized to reduce the radiation dose to as low as reasonably achievable.; CT of the head was performed without the administration of intravenous contrast. Automated exposure control, iterative reconstruction, and/or weight based adjustment of the mA/kV was utilized to reduce the radiation dose to as low as reasonably achievable. Noncontrast CT of the head with reconstructed 2-D images are also provided for review. COMPARISON: None. HISTORY: ORDERING SYSTEM PROVIDED HISTORY: change nih TECHNOLOGIST PROVIDED HISTORY: Reason for exam:->change nih Has a \"code stroke\" or \"stroke alert\" been called?->No; ORDERING SYSTEM PROVIDED HISTORY: change in NIH TECHNOLOGIST PROVIDED HISTORY: Reason for exam:->change in NIH Has a \"code stroke\" or \"stroke alert\" been called?->No FINDINGS: CT HEAD: BRAIN/VENTRICLES:  No acute intracranial hemorrhage or extraaxial fluid collection. Grey-white differentiation is maintained.  No evidence of mass, mass effect or midline shift.  No evidence of hydrocephalus.  Mild diffuse cerebral atrophy and chronic white matter ischemic change. ORBITS: The visualized portion of the orbits demonstrate no acute abnormality. SINUSES:  The visualized paranasal sinuses and mastoid air cells demonstrate no acute abnormality. SOFT TISSUES/SKULL: No acute abnormality of the visualized skull or soft tissues. CTA NECK: AORTIC ARCH/ARCH VESSELS: No dissection or arterial injury.  No significant stenosis of the brachiocephalic or subclavian arteries. CAROTID ARTERIES: No dissection, arterial injury, or hemodynamically significant stenosis by NASCET  segment. No significant stenosis seen of the basilar or vertebral arteries. No aneurysm identified. OTHER: No dural venous sinus thrombosis on this non-dedicated study. BRAIN: Questionable age indeterminate infarct  involving the left occipital lobe. No mass effect or midline shift. No evidence of an acute intracranial hemorrhage.     1. Occlusion of the left posterior cerebral artery at the level of the P1 segment. 2. Severe focal stenosis of the right P2 segment. 3. Otherwise, no significant stenosis of the intracranial vasculature. 4. No flow limiting  stenosis of the cervical carotids/vertebral arteries. 5. Questionable age indeterminate infarct involving the left occipital lobe. No mass effect or midline shift. No evidence of an acute intracranial hemorrhage. Findings were discussed with Dr. Valente on 06/09/2025 at 1:05PM     XR CHEST PORTABLE  Result Date: 6/9/2025  EXAMINATION: ONE XRAY VIEW OF THE CHEST 6/9/2025 12:38 pm COMPARISON: 05/03/2010 HISTORY: ORDERING SYSTEM PROVIDED HISTORY: Stroke eval TECHNOLOGIST PROVIDED HISTORY: Reason for exam:->Stroke eval Reason for Exam: Altered Mental Status/ Stroke Evaluation FINDINGS: The lungs are without acute focal process.  There is no effusion or pneumothorax. The cardiomediastinal silhouette is without acute process. The osseous structures are without acute process.     No acute process.       CBC: No results for input(s): \"WBC\", \"HGB\", \"PLT\" in the last 72 hours.  BMP:  No results for input(s): \"NA\", \"K\", \"CL\", \"CO2\", \"BUN\", \"CREATININE\", \"GLUCOSE\" in the last 72 hours.  Hepatic: No results for input(s): \"AST\", \"ALT\", \"BILITOT\", \"ALKPHOS\" in the last 72 hours.    Invalid input(s): \"ALB\"  Lipids:   Lab Results   Component Value Date/Time    CHOL 199 06/10/2025 04:39 AM    HDL 72 06/10/2025 04:39 AM    TRIG 94 06/10/2025 04:39 AM     Hemoglobin A1C:   Lab Results   Component Value Date/Time    LABA1C 5.2 06/10/2025 04:39 AM     TSH:   Lab Results   Component

## 2025-06-14 NOTE — PLAN OF CARE
Problem: Pain  Goal: Verbalizes/displays adequate comfort level or baseline comfort level  Outcome: Progressing  Flowsheets (Taken 6/13/2025 1954)  Verbalizes/displays adequate comfort level or baseline comfort level: Encourage patient to monitor pain and request assistance     Problem: Chronic Conditions and Co-morbidities  Goal: Patient's chronic conditions and co-morbidity symptoms are monitored and maintained or improved  Outcome: Progressing  Flowsheets (Taken 6/13/2025 1954)  Care Plan - Patient's Chronic Conditions and Co-Morbidity Symptoms are Monitored and Maintained or Improved: Monitor and assess patient's chronic conditions and comorbid symptoms for stability, deterioration, or improvement     Problem: Discharge Planning  Goal: Discharge to home or other facility with appropriate resources  Outcome: Progressing  Flowsheets (Taken 6/13/2025 1954)  Discharge to home or other facility with appropriate resources: Identify barriers to discharge with patient and caregiver     Problem: Safety - Adult  Goal: Free from fall injury  Outcome: Progressing     Problem: Skin/Tissue Integrity  Goal: Skin integrity remains intact  Description: 1.  Monitor for areas of redness and/or skin breakdown2.  Assess vascular access sites hourly3.  Every 4-6 hours minimum:  Change oxygen saturation probe site4.  Every 4-6 hours:  If on nasal continuous positive airway pressure, respiratory therapy assess nares and determine need for appliance change or resting period  Outcome: Progressing  Flowsheets (Taken 6/13/2025 1954)  Skin Integrity Remains Intact: Monitor for areas of redness and/or skin breakdown     Problem: Neurosensory - Adult  Goal: Achieves stable or improved neurological status  Outcome: Progressing  Flowsheets (Taken 6/13/2025 1954)  Achieves stable or improved neurological status: Assess for and report changes in neurological status     Problem: Neurosensory - Adult  Goal: Absence of seizures  Outcome:

## 2025-06-15 LAB
ANION GAP SERPL CALCULATED.3IONS-SCNC: 11 MMOL/L (ref 3–16)
BASOPHILS # BLD: 0.1 K/UL (ref 0–0.2)
BASOPHILS NFR BLD: 0.7 %
BUN SERPL-MCNC: 50 MG/DL (ref 7–20)
CALCIUM SERPL-MCNC: 9.2 MG/DL (ref 8.3–10.6)
CHLORIDE SERPL-SCNC: 108 MMOL/L (ref 99–110)
CO2 SERPL-SCNC: 23 MMOL/L (ref 21–32)
CREAT SERPL-MCNC: 1.2 MG/DL (ref 0.6–1.2)
DEPRECATED RDW RBC AUTO: 13.1 % (ref 12.4–15.4)
EOSINOPHIL # BLD: 0 K/UL (ref 0–0.6)
EOSINOPHIL NFR BLD: 0.1 %
GFR SERPLBLD CREATININE-BSD FMLA CKD-EPI: 46 ML/MIN/{1.73_M2}
GLUCOSE SERPL-MCNC: 92 MG/DL (ref 70–99)
HCT VFR BLD AUTO: 40.5 % (ref 36–48)
HGB BLD-MCNC: 13.8 G/DL (ref 12–16)
LYMPHOCYTES # BLD: 1.1 K/UL (ref 1–5.1)
LYMPHOCYTES NFR BLD: 13.4 %
MAGNESIUM SERPL-MCNC: 2.08 MG/DL (ref 1.8–2.4)
MCH RBC QN AUTO: 31.1 PG (ref 26–34)
MCHC RBC AUTO-ENTMCNC: 34 G/DL (ref 31–36)
MCV RBC AUTO: 91.4 FL (ref 80–100)
MONOCYTES # BLD: 0.6 K/UL (ref 0–1.3)
MONOCYTES NFR BLD: 6.8 %
NEUTROPHILS # BLD: 6.5 K/UL (ref 1.7–7.7)
NEUTROPHILS NFR BLD: 79 %
PLATELET # BLD AUTO: 198 K/UL (ref 135–450)
PMV BLD AUTO: 8.9 FL (ref 5–10.5)
POTASSIUM SERPL-SCNC: 3.4 MMOL/L (ref 3.5–5.1)
RBC # BLD AUTO: 4.44 M/UL (ref 4–5.2)
SODIUM SERPL-SCNC: 142 MMOL/L (ref 136–145)
WBC # BLD AUTO: 8.2 K/UL (ref 4–11)

## 2025-06-15 PROCEDURE — 80048 BASIC METABOLIC PNL TOTAL CA: CPT

## 2025-06-15 PROCEDURE — 6360000002 HC RX W HCPCS: Performed by: INTERNAL MEDICINE

## 2025-06-15 PROCEDURE — 6360000002 HC RX W HCPCS: Performed by: STUDENT IN AN ORGANIZED HEALTH CARE EDUCATION/TRAINING PROGRAM

## 2025-06-15 PROCEDURE — 94760 N-INVAS EAR/PLS OXIMETRY 1: CPT

## 2025-06-15 PROCEDURE — 2580000003 HC RX 258: Performed by: STUDENT IN AN ORGANIZED HEALTH CARE EDUCATION/TRAINING PROGRAM

## 2025-06-15 PROCEDURE — 99233 SBSQ HOSP IP/OBS HIGH 50: CPT | Performed by: NURSE PRACTITIONER

## 2025-06-15 PROCEDURE — 6370000000 HC RX 637 (ALT 250 FOR IP): Performed by: INTERNAL MEDICINE

## 2025-06-15 PROCEDURE — 83735 ASSAY OF MAGNESIUM: CPT

## 2025-06-15 PROCEDURE — 2500000003 HC RX 250 WO HCPCS: Performed by: STUDENT IN AN ORGANIZED HEALTH CARE EDUCATION/TRAINING PROGRAM

## 2025-06-15 PROCEDURE — 36415 COLL VENOUS BLD VENIPUNCTURE: CPT

## 2025-06-15 PROCEDURE — 2500000003 HC RX 250 WO HCPCS: Performed by: INTERNAL MEDICINE

## 2025-06-15 PROCEDURE — 85025 COMPLETE CBC W/AUTO DIFF WBC: CPT

## 2025-06-15 PROCEDURE — 1200000000 HC SEMI PRIVATE

## 2025-06-15 RX ORDER — SODIUM CHLORIDE, SODIUM LACTATE, POTASSIUM CHLORIDE, CALCIUM CHLORIDE 600; 310; 30; 20 MG/100ML; MG/100ML; MG/100ML; MG/100ML
INJECTION, SOLUTION INTRAVENOUS CONTINUOUS
Status: DISCONTINUED | OUTPATIENT
Start: 2025-06-15 | End: 2025-06-16

## 2025-06-15 RX ADMIN — SODIUM CHLORIDE, SODIUM LACTATE, POTASSIUM CHLORIDE, AND CALCIUM CHLORIDE: .6; .31; .03; .02 INJECTION, SOLUTION INTRAVENOUS at 01:03

## 2025-06-15 RX ADMIN — SODIUM CHLORIDE, PRESERVATIVE FREE 10 ML: 5 INJECTION INTRAVENOUS at 09:27

## 2025-06-15 RX ADMIN — WATER 1000 MG: 1 INJECTION INTRAMUSCULAR; INTRAVENOUS; SUBCUTANEOUS at 17:12

## 2025-06-15 RX ADMIN — ENOXAPARIN SODIUM 40 MG: 100 INJECTION SUBCUTANEOUS at 20:22

## 2025-06-15 RX ADMIN — LAMOTRIGINE 25 MG: 25 TABLET ORAL at 20:22

## 2025-06-15 RX ADMIN — SERTRALINE 50 MG: 50 TABLET, FILM COATED ORAL at 09:27

## 2025-06-15 RX ADMIN — SODIUM CHLORIDE, SODIUM LACTATE, POTASSIUM CHLORIDE, AND CALCIUM CHLORIDE: .6; .31; .03; .02 INJECTION, SOLUTION INTRAVENOUS at 23:00

## 2025-06-15 RX ADMIN — SODIUM CHLORIDE, PRESERVATIVE FREE 10 ML: 5 INJECTION INTRAVENOUS at 20:26

## 2025-06-15 RX ADMIN — ATORVASTATIN CALCIUM 80 MG: 80 TABLET, FILM COATED ORAL at 20:22

## 2025-06-15 RX ADMIN — PANTOPRAZOLE SODIUM 40 MG: 40 TABLET, DELAYED RELEASE ORAL at 09:27

## 2025-06-15 RX ADMIN — ASPIRIN 81 MG: 81 TABLET, CHEWABLE ORAL at 09:27

## 2025-06-15 RX ADMIN — SODIUM CHLORIDE, SODIUM LACTATE, POTASSIUM CHLORIDE, AND CALCIUM CHLORIDE: .6; .31; .03; .02 INJECTION, SOLUTION INTRAVENOUS at 17:12

## 2025-06-15 NOTE — PROGRESS NOTES
V2.0    [facility] Progress Note      Name:  Darshana Grace /Age/Sex: 1945  (79 y.o. female)   MRN & CSN:  [id number] & [id number] Encounter Date/Time: 6/15/2025 8:57 AM EDT   Location:  [id number] PCP: Eugenia Lamb MD     Attending:Master Garcia DO       Hospital Day: 7  Brief HPI  Darshana Grace is a 79 y.o. female with pertinent PMHx of CVA, CKD, T2DM, HTN, HLD who presented with altered mental status.  She had garbled speech and right-sided facial droop and agitation..  Her imaging in the ED showed concern for left PCA occlusion and severe stenosis of right P2 segment.  She was admitted for further care and possible acute CVA.  Assessment & Plan     Acute CVA  - MRI showed acute infarct in left thalamus and left occipital lobe in the left PCA territory  - CTA showed complete occlusion of left PCA in the P1 segment  - Aspirin 81 mg daily  - Atorvastatin 80 mg nightly  - Neurology previously following, recommended outpatient follow-up in 4 to 6 weeks    Urinary tract infection  - IV Rocephin  - Follow urine culture to    Altered mental status  - Unclear etiology, possibly metabolic encephalopathy secondary to urinary tract infection however there seems to be a behavioral component and she does have a history of conversion disorder, also given her recent suicidal ideation and psychiatric distress I wonder if there could be a component of catatonia.  Given her limited participation in exam is also hard to determine if there is worsening of her acute CVA  - Continue to hold baclofen  - CT head noncontrast yesterday was nonacute  - MRI pending though if continues to improve with treatment of her UTI can likely discontinue  - Neurology consulted  - Monitor on continuous pulse ox    Acute kidney injury, improving  - Continue IV fluid resuscitation      Depression  Suicidal ideation  - Psychiatry following, recommended inpatient psych on discharge  - Continue Lamictal and sertraline

## 2025-06-15 NOTE — PLAN OF CARE
Problem: Pain  Goal: Verbalizes/displays adequate comfort level or baseline comfort level  Outcome: Progressing  Flowsheets (Taken 6/13/2025 1954 by Evelyne Wagner RN)  Verbalizes/displays adequate comfort level or baseline comfort level: Encourage patient to monitor pain and request assistance     Problem: Chronic Conditions and Co-morbidities  Goal: Patient's chronic conditions and co-morbidity symptoms are monitored and maintained or improved  Outcome: Progressing  Flowsheets (Taken 6/13/2025 1954 by Evelyne Wagner, RN)  Care Plan - Patient's Chronic Conditions and Co-Morbidity Symptoms are Monitored and Maintained or Improved: Monitor and assess patient's chronic conditions and comorbid symptoms for stability, deterioration, or improvement     Problem: Discharge Planning  Goal: Discharge to home or other facility with appropriate resources  Outcome: Progressing  Flowsheets (Taken 6/13/2025 1954 by Evelyne Wagner RN)  Discharge to home or other facility with appropriate resources: Identify barriers to discharge with patient and caregiver     Problem: Safety - Adult  Goal: Free from fall injury  Outcome: Progressing  Flowsheets (Taken 6/15/2025 0217)  Free From Fall Injury: Instruct family/caregiver on patient safety     Problem: Skin/Tissue Integrity  Goal: Skin integrity remains intact  Description: 1.  Monitor for areas of redness and/or skin breakdown2.  Assess vascular access sites hourly3.  Every 4-6 hours minimum:  Change oxygen saturation probe site4.  Every 4-6 hours:  If on nasal continuous positive airway pressure, respiratory therapy assess nares and determine need for appliance change or resting period  Outcome: Progressing  Flowsheets (Taken 6/13/2025 1954 by Evelyne Wagner RN)  Skin Integrity Remains Intact: Monitor for areas of redness and/or skin breakdown     Problem: Neurosensory - Adult  Goal: Achieves stable or improved neurological status  Outcome:  Progressing  Flowsheets (Taken 6/15/2025 0217)  Achieves stable or improved neurological status: Assess for and report changes in neurological status  Goal: Absence of seizures  Outcome: Progressing  Goal: Achieves maximal functionality and self care  Outcome: Progressing     Problem: Confusion  Goal: Confusion, delirium, dementia, or psychosis is improved or at baseline  Description: INTERVENTIONS:  1. Assess for possible contributors to thought disturbance, including medications, impaired vision or hearing, underlying metabolic abnormalities, dehydration, psychiatric diagnoses, and notify attending LIP  2. [place] high risk fall precautions, as indicated  3. Provide frequent short contacts to provide reality reorientation, refocusing and direction  4. Decrease environmental stimuli, including noise as appropriate  5. Monitor and intervene to maintain adequate nutrition, hydration, elimination, sleep and activity  6. If unable to ensure safety without constant attention obtain sitter and review sitter guidelines with assigned personnel  7. Initiate Psychosocial CNS and Spiritual Care consult, as indicated  Outcome: Progressing  Flowsheets (Taken 6/15/2025 0217)  Effect of thought disturbance (confusion, delirium, dementia, or psychosis) are managed with adequate functional status:   Assess for contributors to thought disturbance, including medications, impaired vision or hearing, underlying metabolic abnormalities, dehydration, psychiatric diagnoses, notify LIP   [place] high risk fall precautions, as indicated   Provide frequent short contacts to provide reality reorientation, refocusing and direction   Decrease environmental stimuli, including noise as appropriate   Monitor and intervene to maintain adequate nutrition, hydration, elimination, sleep and activity     Problem: Respiratory - Adult  Goal: Achieves optimal ventilation and oxygenation  Outcome: Progressing  Flowsheets (Taken 6/15/2025

## 2025-06-15 NOTE — VIRTUAL HEALTH
sertraline (ZOLOFT) tablet 50 mg, 50 mg, Oral, Daily, Phuong Bains MD, 50 mg at 06/15/25 0927    lamoTRIgine (LAMICTAL) tablet 25 mg, 25 mg, Oral, Nightly, Phuong Bains MD, 25 mg at 06/13/25 2144    [Held by provider] lisinopril (PRINIVIL;ZESTRIL) tablet 5 mg, 5 mg, Oral, Daily, Phuong Bains MD, 5 mg at 06/13/25 0903    [Held by provider] baclofen (LIORESAL) tablet 10 mg, 10 mg, Oral, Nightly, Phuong Bains MD, 10 mg at 06/13/25 2144    pantoprazole (PROTONIX) tablet 40 mg, 40 mg, Oral, Daily, Phuong Bains MD, 40 mg at 06/15/25 0927    sodium chloride flush 0.9 % injection 5-40 mL, 5-40 mL, IntraVENous, 2 times per day, Phuong Bains MD, 10 mL at 06/15/25 0927    sodium chloride flush 0.9 % injection 5-40 mL, 5-40 mL, IntraVENous, PRN, Phuong Bains MD    0.9 % sodium chloride infusion, , IntraVENous, PRN, Phuong Bains MD    ondansetron (ZOFRAN-ODT) disintegrating tablet 4 mg, 4 mg, Oral, Q8H PRN **OR** ondansetron (ZOFRAN) injection 4 mg, 4 mg, IntraVENous, Q6H PRN, Phuong Bains MD    polyethylene glycol (GLYCOLAX) packet 17 g, 17 g, Oral, Daily PRN, Phuong Bains MD    enoxaparin (LOVENOX) injection 40 mg, 40 mg, SubCUTAneous, Nightly, Phuong Bains MD, 40 mg at 06/14/25 2332    atorvastatin (LIPITOR) tablet 80 mg, 80 mg, Oral, Nightly, Phuong Bains MD, 80 mg at 06/13/25 2144    aspirin chewable tablet 81 mg, 81 mg, Oral, Daily, 81 mg at 06/15/25 0927 **OR** aspirin suppository 300 mg, 300 mg, Rectal, Daily, Phuong Bains MD, 300 mg at 06/14/25 1208    Vital signs:   Vitals:    06/15/25 1216   BP:    Pulse: 67   Resp: 18   Temp:    SpO2: 95%       Labs:   Recent Results (from the past 48 hours)   Basic Metabolic Panel w/ Reflex to MG    Collection Time: 06/14/25 11:10 AM   Result Value Ref Range    Sodium 137 136 - 145 mmol/L    Potassium reflex Magnesium 3.2 (L) 3.5 - 5.1 mmol/L    Chloride 103 99 - 110 mmol/L    CO2 21 21 - 32 mmol/L    Anion Gap 13 3 - 16    Glucose 109 (H) 70 - 99 mg/dL     6/12/2025  EXAMINATION: MRI OF THE BRAIN WITHOUT CONTRAST  6/10/2025 1:53 pm TECHNIQUE: Multiplanar multisequence MRI of the brain was performed without the administration of intravenous contrast. COMPARISON: 03/10/2009 HISTORY: ORDERING SYSTEM PROVIDED HISTORY: stroke like symptoms TECHNOLOGIST PROVIDED HISTORY: Reason for exam:->stroke like symptoms Decision Support Exception - unselect if not a suspected or confirmed emergency medical condition->Emergency Medical Condition (MA) Reason for Exam: stroke like symptoms Initial evaluation. FINDINGS: INTRACRANIAL STRUCTURES/VENTRICLES: There are areas of restricted diffusion in the left thalamus and left occipital lobe consistent with acute areas of infarct in the left posterior cerebral artery territory.  No other areas of restricted diffusion are identified. There is mild cerebral atrophy. There are numerous punctate and confluent areas of high-signal in the periventricular white matter and centrum semiovale and central norma  that are likely related to chronic small vessel ischemic disease.  There is no midline shift or mass effect. ORBITS: The visualized portion of the orbits demonstrate no acute abnormality. SINUSES: There is mild mucoperiosteal thickening of the ethmoid air cells. There is partial opacification of the left mastoid air cells.  The right mastoid air cells are clear. BONES/SOFT TISSUES: The bone marrow signal intensity appears normal. The soft tissues demonstrate no acute abnormality.     1. Acute areas of infarct in the left thalamus and left occipital lobe in the left posterior cerebral artery territory. 2. Cerebral atrophy. Moderate chronic small vessel ischemic changes.     Echo (TTE) complete (PRN contrast/bubble/strain/3D)  Result Date: 6/11/2025    Left Ventricle: Normal left ventricular systolic function with a visually estimated EF of 55 - 60%. Left ventricle size is normal. Normal wall thickness. Normal wall motion. Grade I diastolic

## 2025-06-15 NOTE — PROGRESS NOTES
Neurology Progress Note    Updates  Became less interactive/responsive yesterday.   Today seems like she is a bit better.     Medical History:  Past Medical History:   Diagnosis Date    Arthritis     Bell's palsy     Cerebral artery occlusion with cerebral infarction (HCC)     CKD (chronic kidney disease)     Claustrophobia     Diabetes mellitus (HCC)     Esophageal reflux     Hx of blood clots     X 14    Hyperlipidemia     Hypertension     Kidney stone     Nervous     Pulmonary embolism (HCC)     Stuttering      Past Surgical History:   Procedure Laterality Date    ABDOMEN SURGERY      APPENDECTOMY      BLEPHAROPLASTY Bilateral 03/01/2021    BILATERAL UPPER LID BLEPHAROPLASTY performed by Flakita Hull MD at [facility]    CARDIAC CATHETERIZATION      CHOLECYSTECTOMY      COLONOSCOPY      ENDOSCOPY, COLON, DIAGNOSTIC      HYSTERECTOMY (CERVIX STATUS UNKNOWN)      KNEE SURGERY Right     TONSILLECTOMY       Scheduled Meds:   cefTRIAXone (ROCEPHIN) IV  1,000 mg IntraVENous Q24H    sertraline  50 mg Oral Daily    lamoTRIgine  25 mg Oral Nightly    [Held by provider] lisinopril  5 mg Oral Daily    [Held by provider] baclofen  10 mg Oral Nightly    pantoprazole  40 mg Oral Daily    sodium chloride flush  5-40 mL IntraVENous 2 times per day    enoxaparin  40 mg SubCUTAneous Nightly    atorvastatin  80 mg Oral Nightly    aspirin  81 mg Oral Daily    Or    aspirin  300 mg Rectal Daily     Continuous Infusions:   lactated ringers 100 mL/hr at 06/15/25 0627    sodium chloride       Medications Prior to Admission:   baclofen (LIORESAL) 10 MG tablet, Take 1 tablet by mouth at bedtime  dicyclomine (BENTYL) 10 MG capsule, Take 1 capsule by mouth 3 times daily as needed (spasms)  omeprazole (PRILOSEC) 40 MG delayed release capsule, Take 1 capsule by mouth daily (Patient not taking: Reported on 6/9/2025)  lamoTRIgine (LAMICTAL) 25 MG tablet, Take 25 mg by mouth 2 times daily (Patient not taking: Reported on

## 2025-06-15 NOTE — PLAN OF CARE
Problem: Pain  Goal: Verbalizes/displays adequate comfort level or baseline comfort level  6/15/2025 0908 by Deion Meyer RN  Outcome: Progressing     Problem: Chronic Conditions and Co-morbidities  Goal: Patient's chronic conditions and co-morbidity symptoms are monitored and maintained or improved  6/15/2025 0908 by Deion Meyer RN  Outcome: Progressing     Problem: Discharge Planning  Goal: Discharge to home or other facility with appropriate resources  6/15/2025 0908 by Deion Meyer RN  Outcome: Progressing     Problem: Safety - Adult  Goal: Free from fall injury  6/15/2025 0908 by Deion Meyer RN  Outcome: Progressing     Problem: Skin/Tissue Integrity  Goal: Skin integrity remains intact  Description: 1.  Monitor for areas of redness and/or skin breakdown2.  Assess vascular access sites hourly3.  Every 4-6 hours minimum:  Change oxygen saturation probe site4.  Every 4-6 hours:  If on nasal continuous positive airway pressure, respiratory therapy assess nares and determine need for appliance change or resting period  6/15/2025 0908 by Deion Meyer RN  Outcome: Progressing     Problem: Neurosensory - Adult  Goal: Achieves stable or improved neurological status  6/15/2025 0908 by Deion Meyer RN  Outcome: Progressing     Problem: Neurosensory - Adult  Goal: Absence of seizures  6/15/2025 0908 by Deion Meyer RN  Outcome: Progressing     Problem: Neurosensory - Adult  Goal: Achieves maximal functionality and self care  6/15/2025 0908 by Deion Meyer RN  Outcome: Progressing     Problem: Respiratory - Adult  Goal: Achieves optimal ventilation and oxygenation  6/15/2025 0908 by Deion Meyer RN  Outcome: Progressing     Problem: Cardiovascular - Adult  Goal: Maintains optimal cardiac output and hemodynamic stability  6/15/2025 0908 by Deion Meyer RN  Outcome: Progressing     Problem: Cardiovascular - Adult  Goal: Absence of cardiac dysrhythmias or at baseline  6/15/2025 0908

## 2025-06-15 NOTE — PROGRESS NOTES
UA culture was positive, MD Garcia notified.    Electronically signed by Deion Meyer RN on 6/15/2025 at 3:36 PM

## 2025-06-15 NOTE — PROGRESS NOTES
Patient was able to take PO medication without complications - had delayed responses but was able to tell the nurse her name and year she was born. Patient offered/attempted to help patient with breakfast but patient wanting to rest at this time. Will continue with POC.    Electronically signed by Deion Meyer RN on 6/15/2025 at 9:41 AM

## 2025-06-16 ENCOUNTER — APPOINTMENT (OUTPATIENT)
Dept: MRI IMAGING | Age: 80
DRG: 064 | End: 2025-06-16
Payer: MEDICARE

## 2025-06-16 LAB
BACTERIA UR CULT: ABNORMAL
BACTERIA UR CULT: ABNORMAL
ORGANISM: ABNORMAL
ORGANISM: ABNORMAL
POTASSIUM SERPL-SCNC: 3.7 MMOL/L (ref 3.5–5.1)

## 2025-06-16 PROCEDURE — 6360000002 HC RX W HCPCS: Performed by: INTERNAL MEDICINE

## 2025-06-16 PROCEDURE — 97130 THER IVNTJ EA ADDL 15 MIN: CPT

## 2025-06-16 PROCEDURE — 97129 THER IVNTJ 1ST 15 MIN: CPT

## 2025-06-16 PROCEDURE — 97530 THERAPEUTIC ACTIVITIES: CPT | Performed by: PHYSICAL THERAPIST

## 2025-06-16 PROCEDURE — 2500000003 HC RX 250 WO HCPCS: Performed by: STUDENT IN AN ORGANIZED HEALTH CARE EDUCATION/TRAINING PROGRAM

## 2025-06-16 PROCEDURE — 6370000000 HC RX 637 (ALT 250 FOR IP): Performed by: INTERNAL MEDICINE

## 2025-06-16 PROCEDURE — 97535 SELF CARE MNGMENT TRAINING: CPT

## 2025-06-16 PROCEDURE — 97530 THERAPEUTIC ACTIVITIES: CPT

## 2025-06-16 PROCEDURE — 2500000003 HC RX 250 WO HCPCS: Performed by: INTERNAL MEDICINE

## 2025-06-16 PROCEDURE — 6360000002 HC RX W HCPCS: Performed by: STUDENT IN AN ORGANIZED HEALTH CARE EDUCATION/TRAINING PROGRAM

## 2025-06-16 PROCEDURE — 1200000000 HC SEMI PRIVATE

## 2025-06-16 PROCEDURE — 84132 ASSAY OF SERUM POTASSIUM: CPT

## 2025-06-16 PROCEDURE — 6370000000 HC RX 637 (ALT 250 FOR IP): Performed by: STUDENT IN AN ORGANIZED HEALTH CARE EDUCATION/TRAINING PROGRAM

## 2025-06-16 PROCEDURE — 70551 MRI BRAIN STEM W/O DYE: CPT

## 2025-06-16 PROCEDURE — 97116 GAIT TRAINING THERAPY: CPT | Performed by: PHYSICAL THERAPIST

## 2025-06-16 PROCEDURE — 94760 N-INVAS EAR/PLS OXIMETRY 1: CPT

## 2025-06-16 PROCEDURE — 36415 COLL VENOUS BLD VENIPUNCTURE: CPT

## 2025-06-16 PROCEDURE — 6370000000 HC RX 637 (ALT 250 FOR IP): Performed by: NURSE PRACTITIONER

## 2025-06-16 RX ORDER — CLOPIDOGREL BISULFATE 75 MG/1
75 TABLET ORAL NIGHTLY
Status: DISCONTINUED | OUTPATIENT
Start: 2025-06-16 | End: 2025-06-18 | Stop reason: HOSPADM

## 2025-06-16 RX ORDER — CLOPIDOGREL BISULFATE 75 MG/1
75 TABLET ORAL DAILY
Status: DISCONTINUED | OUTPATIENT
Start: 2025-06-16 | End: 2025-06-16

## 2025-06-16 RX ADMIN — SODIUM CHLORIDE, PRESERVATIVE FREE 10 ML: 5 INJECTION INTRAVENOUS at 20:57

## 2025-06-16 RX ADMIN — PANTOPRAZOLE SODIUM 40 MG: 40 TABLET, DELAYED RELEASE ORAL at 09:52

## 2025-06-16 RX ADMIN — CLOPIDOGREL BISULFATE 75 MG: 75 TABLET, FILM COATED ORAL at 20:56

## 2025-06-16 RX ADMIN — ASPIRIN 81 MG: 81 TABLET, CHEWABLE ORAL at 09:52

## 2025-06-16 RX ADMIN — WATER 1000 MG: 1 INJECTION INTRAMUSCULAR; INTRAVENOUS; SUBCUTANEOUS at 17:50

## 2025-06-16 RX ADMIN — ATORVASTATIN CALCIUM 80 MG: 80 TABLET, FILM COATED ORAL at 20:56

## 2025-06-16 RX ADMIN — SERTRALINE 50 MG: 50 TABLET, FILM COATED ORAL at 09:52

## 2025-06-16 RX ADMIN — ENOXAPARIN SODIUM 40 MG: 100 INJECTION SUBCUTANEOUS at 20:56

## 2025-06-16 RX ADMIN — LISINOPRIL 5 MG: 5 TABLET ORAL at 09:52

## 2025-06-16 RX ADMIN — LAMOTRIGINE 25 MG: 25 TABLET ORAL at 20:56

## 2025-06-16 RX ADMIN — SODIUM CHLORIDE, PRESERVATIVE FREE 10 ML: 5 INJECTION INTRAVENOUS at 09:52

## 2025-06-16 NOTE — PROGRESS NOTES
Patient attempting to get out of bed. Patient interactive with RN. Patient advised she is of age and wants to do what she wants to do. Patient advised she wanted to walk home. Patient agreeable to staying in bed.     2210- patient becoming increasingly agitated. Patient ripped out her IV an when RN attempted to stop the bleeding with guaze and tape, patient bent nurses finger. MD notified.     06/16/2025 0050 Patient removed another IV from her arm. Advising \"I am a 79 year old. I do not have to do this\". RN redirected/ educated patient on need for IV fluids.     0110 New IV in place. Patient tolerated well with the help of multiple Rns    0130 Patient speaking with RN about the need to get home to her dog. Patient is agreeable to IV fluid plan. Patient anxious and concerned about friends taking her stuff and keeping her in the hospital against her will.

## 2025-06-16 NOTE — PROGRESS NOTES
[facility] - Physical Therapy   Phone: [phone]    Physical Therapy  Facility/Department: [address] PROGRESSIVE CARE      [] Initial Evaluation            [x] Daily Treatment Note         [] Discharge Summary      Patient: Darshana Grace   : 1945   MRN: [id number]   Date of Service:  2025  Staff Mobility Recommendation: RW x 1 c belt    AM-PAC score:   Discharge Recommendations: ARU    Admitting Diagnosis: Stroke-like symptoms  Ordering Physician: Phuong Bains MD on 25  Current Admission Summary: \"Darshana Grace is a 79 y.o. female presenting to the ED for altered mental status.  Patient denies show to work today therefore a welfare check was done.  Patient was found disoriented with inability to follow commands and answer questions appropriately.  Patient has a past medical history of Bell's palsy and stroke with residual lower face droop.  Patient does have dysarthria at baseline however family states her dysarthria is worse compared to her baseline.  Patient denies chest pain or shortness of breath.  Patient is agitated and noncompliant with examination and interview.  Last well-known was potentially sometime yesterday.\" - MRI (+) Interval progression of the left PCA territory infarct including involvement of the left thalamus, increased in size compared to the prior exam.     Past Medical History:  has a past medical history of Arthritis, Bell's palsy, Cerebral artery occlusion with cerebral infarction (HCC), CKD (chronic kidney disease), Claustrophobia, Diabetes mellitus (HCC), Esophageal reflux, Hx of blood clots, Hyperlipidemia, Hypertension, Kidney stone, Nervous, Pulmonary embolism (HCC), and Stuttering.  Past Surgical History:  has a past surgical history that includes Hysterectomy; Appendectomy; Cardiac catheterization; Cholecystectomy; Colonoscopy; Tonsillectomy; knee surgery (Right); blepharoplasty (Bilateral, 2021); Abdomen surgery; and Endoscopy,

## 2025-06-16 NOTE — CARE COORDINATION
Discharge planning:   Call received stating family declining facility in [place].   Called to [facility]. Discussed patient's need for inpatient psychiatric placement. Inquired about local facilities.   Nini ASHER with [facility] states the local facilities are unable to accept patient due to patient's high acuity or being out of network with insurance. Next step would be to extend referrals to facilities within extended radius.     Called to patient's friend and Power of  Annabelle Hoyos #[phone], notified of the above. Notified that [facility] will need to extend search for psychiatric placement to a group including [facility]. Annabelle Hoyos requested to have referrals extended to facilities that may be closer in distance in that radius.     Called to [facility]. Spoke to Nini RN requested additional referrals be placed to additional facilities that may be closer that [facility].     FARIDEH Mcneill, LSW, Social Work/Case Management   [phone]  Electronically signed by FARIDEH Mcneill on 6/16/2025 at 3:02 PM

## 2025-06-16 NOTE — PLAN OF CARE
Problem: Pain  Goal: Verbalizes/displays adequate comfort level or baseline comfort level  Outcome: Progressing     Problem: Chronic Conditions and Co-morbidities  Goal: Patient's chronic conditions and co-morbidity symptoms are monitored and maintained or improved  Outcome: Progressing     Problem: Discharge Planning  Goal: Discharge to home or other facility with appropriate resources  Outcome: Progressing     Problem: Safety - Adult  Goal: Free from fall injury  Outcome: Progressing     Problem: Skin/Tissue Integrity  Goal: Skin integrity remains intact  Description: 1.  Monitor for areas of redness and/or skin breakdown2.  Assess vascular access sites hourly3.  Every 4-6 hours minimum:  Change oxygen saturation probe site4.  Every 4-6 hours:  If on nasal continuous positive airway pressure, respiratory therapy assess nares and determine need for appliance change or resting period  Outcome: Progressing     Problem: Neurosensory - Adult  Goal: Achieves stable or improved neurological status  Outcome: Progressing  Goal: Absence of seizures  Outcome: Progressing  Goal: Achieves maximal functionality and self care  Outcome: Progressing     Problem: Confusion  Goal: Confusion, delirium, dementia, or psychosis is improved or at baseline  Description: INTERVENTIONS:  1. Assess for possible contributors to thought disturbance, including medications, impaired vision or hearing, underlying metabolic abnormalities, dehydration, psychiatric diagnoses, and notify attending LIP  2. [place] high risk fall precautions, as indicated  3. Provide frequent short contacts to provide reality reorientation, refocusing and direction  4. Decrease environmental stimuli, including noise as appropriate  5. Monitor and intervene to maintain adequate nutrition, hydration, elimination, sleep and activity  6. If unable to ensure safety without constant attention obtain sitter and review sitter guidelines with assigned personnel  7. Initiate

## 2025-06-16 NOTE — PROGRESS NOTES
V2.0    [facility] [place]      Name:  Darshana Grace /Age/Sex: 1945  (79 y.o. female)   MRN & CSN:  [id number] & [id number] Encounter Date/Time: 2025 8:57 AM EDT   Location:  [id number] PCP: Eugenia Lamb MD     Attending:Master Garcia DO       Hospital Day: 8  Brief HPI  Darshana Grace is a 79 y.o. female with pertinent PMHx of CVA, CKD, T2DM, HTN, HLD who presented with altered mental status.  She had garbled speech and right-sided facial droop and agitation..  Her imaging in the ED showed concern for left PCA occlusion and severe stenosis of right P2 segment.  She was admitted for further care and possible acute CVA.  Assessment & Plan     Acute CVA  - MRI showed acute infarct in left thalamus and left occipital lobe in the left PCA territory, follow-up MRI  showing enlargement of left PCA infarct  - CTA showed complete occlusion of left PCA in the P1 segment  - Aspirin 81 mg daily  - Atorvastatin 80 mg nightly  - Neurology previously following, recommended outpatient follow-up in 4 to 6 weeks    Urinary tract infection  - Continue IV Rocephin while inpatient with plan to transition to orals at discharge  - Urine culture with growth of pansensitive E. coli and Proteus    Altered mental status  - Suspect that this is most likely metabolic encephalopathy from underlying urinary tract infection as she has had significant improvement in symptoms since treatment  - Continue to hold baclofen  -Discontinue continuous pulse ox    Acute kidney injury, resolved  - Discontinue IV fluids    Depression  Suicidal ideation  - Psychiatry following, recommended inpatient psych on discharge  - Continue Lamictal and sertraline     Essential hypertension  - Continue lisinopril      Diet ADULT DIET; Easy to Chew   DVT Prophylaxis [x] Lovenox, []  Heparin, [] SCDs, [] Ambulation,  [] Eliquis, [] Xarelto  [] Coumadin   Code Status Full Code   Disposition From: Home  Expected Disposition:

## 2025-06-16 NOTE — CARE COORDINATION
Discharge planning:  Spoke to patient's Power of Attorneys - Dylan Hoyos at bedside.   Discussed discharge plan with friends. They are aware of current recommendation from psychiatrist that patient meets inpatient psych placement at discharge. Notified of the new referrals to facilities in [place].   Obtained Power of  documents and DNR-CCA form and placed copy on chart.     [facility]:   Obtained [facility] referral sheet, Fredy RN assisted with completion. Sent secure fax to [facility].   Called to [facility], spoke to Yoselin, confirmed they have obtained documents.     FARIDEH Mcneill, LSW, Social Work/Case Management   [phone]  Electronically signed by FARIDEH Mcneill on 6/16/2025 at 4:54 PM

## 2025-06-16 NOTE — PROGRESS NOTES
for safety  Comments: to/from walker- cuing for hand placement, camacho on R side  Functional Mobility  Functional Mobility Activity: to/from bathroom,    Device Use: rolling walker  Required Assistance: contact guard assistance, minimal assistance  Comment: pt ambulating with RW and CGA/Guicho requiring cues for walker management/safety; decreased safety awareness throughout   Balance:  Static Sitting Balance: fair (+): maintains balance at SBA/supervision without use of UE support  Dynamic Sitting Balance: fair (+): maintains balance at SBA/supervision without use of UE support  Static Standing Balance: fair (-): maintains balance at CGA with use of UE support  Dynamic Standing Balance: poor (+): requires min (A) to maintain balance    Exercises: completed waqas UE  Shoulder flex 15x  Elbow flex/ext 15x  Forward punches 15x  Cognition  Overall Cognitive Status: Impaired  Arousal/Alertness: appropriate responses to stimuli  Following Commands: follows one step commands with repetition, follows one step commands with increased time  Attention Span: attends with cues to redirect  Memory: decreased recall of biographical information, decreased recall of recent events, decreased short term memory  Safety Judgement: decreased awareness of need for assistance, decreased awareness of need for safety  Problem Solving: assistance required to generate solutions, assistance required to implement solutions, decreased awareness of errors, assistance required to identify errors made, assistance required to correct errors made  Insights: decreased awareness of deficits  Initiation: requires cues for some  Sequencing: requires cues for some  Orientation:    oriented to person, oriented to place, oriented to time, and disoriented to situation (not re-assessed this date)     Education  Barriers To Learning: cognition  Patient Education: patient educated on goals, OT role and benefits, plan of care, ADL adaptive strategies, proper use of

## 2025-06-16 NOTE — PROGRESS NOTES
commands/questions; below baseline  Expressive Language Expression:  Reduced word-finding reported as baseline  Motor speech/voice:  Impaired speech intelligibility reported as baseline  Cognitive-linguistic:  Residual deficits dependent on external assist    Medical Or Cognitive/Behavioral Factors Which Can Exacerbate: co morbidities; medical dx    Dysphagia Prognosis: good, guarded  Barriers to Progress: co-morbidities,    SLP treatment Prognosis: guarded  Barriers to Progress:  co-morbidities, severity of impairments, medical dx, psych co morbidities      RECOMMENDATIONS     Recommended Diet and Intervention:  Diet Solids Recommendation:  Easy to Chew texture Liquid Consistency Recommendation:  Thin liquids   Recommended form of Meds:   PO per patient preference      Compensatory Swallowing Strategies:  Eat or Feed Slowly, Remain Upright 30-45 min , Small Bites and Sips , Upright as possible with all PO intake     Eating Assistance Recommendation: Setup or clean-up assistance    Continued Treatment recommendations: Receptive/Expressive Language, Cognitive-Linguistic, Dysphagia  Frequency of treatment: 2-5 times/week    Continued Dysphagia Therapeutic Intervention: Bolus Control Exercise, Diet Tolerance Monitoring , Patient/Family Education , Therapeutic Trials with SLP   Continued SLP Therapeutic Intervention: Cognitive-Linguistic, Cognitive-communication    Anticipated Discharge Recommendations:  Recommend ongoing SLP for speech and dysphagia therapy upon discharge from hospital       GOALS / PLAN     GOALS:  SHORT TERM DYSPHAGIA GOALS  Pt will functionally tolerate recommended diet with no overt clinical s/s of aspiration continue  Pt will advance to least restrictive diet as indicated  continue  Patient will demonstrate carryover of specific compensatory swallow strategies (i.e., postures, techniques,...) with set-up continue  The patient will improve oral preparation phase via bolus control/manipulation

## 2025-06-16 NOTE — PROGRESS NOTES
NAME:  Darshana Grace  YOB: 1945  MEDICAL RECORD NUMBER:  [id number]  TODAYS DATE:  6/16/2025    Discussed personal risk factors for Stroke/TIA with patient/family, and ways to reduce the risk for a recurrent stroke. Patient's personal risk factors which were identified are:     [] Alcohol Abuse: check with your physician before any alcohol consumption.   [] Atrial fibrillation: may cause blood clots.  [] Drug Abuse: Seek help, talk with your doctor  [] Clotting Disorder  [x] Diabetes  [x] Family history of stroke or heart disease  [x] High Blood Pressure/Hypertension: work with your physician.  [x] High cholesterol: monitor cholesterol levels with your physician.   [x] Overweight/Obesity: work with your physician for your ideal body weight.  [x] Physical Inactivity: get regular exercise as directed by your physician.   [] Personal history of previous TIA or stroke  [x] Poor Diet; decrease salt (sodium) in your diet, follow diet directed by physician.   [] Smoking: Cigarette/Cigar: stop smoking.         Advised pt. that you can reduce your risk for stroke/TIA by modifying/controlling the risk factors that you have. Pt.advised to take the medications as prescribed, which will be detailed in the discharge instructions, and to not stop taking them without consulting their physician. In addition, pt. advised to maintain a healthy diet, exercise regularly and to not smoke.      [facility]'s Stroke treatment and prevention, Managing your recovery  notebook  provided and/or reviewed  with patient/family.  The notebook includes, but not limited to, sections addressing warning signs & symptoms of a stroke, which are: sudden numbness or weakness especially on one side of the body, sudden confusion, difficulty speaking or understanding, sudden changes in vision, sudden dizziness or loss of balance/ coordination, sudden severe headache, syncope and seizure.  The need to call EMS (911) immediately if

## 2025-06-16 NOTE — PROGRESS NOTES
Progress Note  The patient is being evaluated for encephalopathy     Updates  Per RN patient is doing better today.   MRI brain was obtained this morning per primary and with some progression of stroke burden.     Denies any SI.     She has sitter at bedside.     Active Ambulatory Problems     Diagnosis Date Noted    No Active Ambulatory Problems     Resolved Ambulatory Problems     Diagnosis Date Noted    No Resolved Ambulatory Problems     Past Medical History:   Diagnosis Date    Arthritis     Bell's palsy     Cerebral artery occlusion with cerebral infarction (HCC)     CKD (chronic kidney disease)     Claustrophobia     Diabetes mellitus (HCC)     Esophageal reflux     Hx of blood clots     Hyperlipidemia     Hypertension     Kidney stone     Nervous     Pulmonary embolism (HCC)     Stuttering        Current Facility-Administered Medications:     cefTRIAXone (ROCEPHIN) 1,000 mg in sterile water 10 mL IV syringe, 1,000 mg, IntraVENous, Q24H, Master Garcia DO, 1,000 mg at 06/15/25 1712    sertraline (ZOLOFT) tablet 50 mg, 50 mg, Oral, Daily, Phuong Bains MD, 50 mg at 06/16/25 0952    lamoTRIgine (LAMICTAL) tablet 25 mg, 25 mg, Oral, Nightly, Phuong Bains MD, 25 mg at 06/15/25 2022    lisinopril (PRINIVIL;ZESTRIL) tablet 5 mg, 5 mg, Oral, Daily, Master Garcia DO, 5 mg at 06/16/25 0952    [Held by provider] baclofen (LIORESAL) tablet 10 mg, 10 mg, Oral, Nightly, Phuong Bains MD, 10 mg at 06/13/25 2144    pantoprazole (PROTONIX) tablet 40 mg, 40 mg, Oral, Daily, Phuong Bains MD, 40 mg at 06/16/25 0952    sodium chloride flush 0.9 % injection 5-40 mL, 5-40 mL, IntraVENous, 2 times per day, Phuong Bains MD, 10 mL at 06/16/25 0952    sodium chloride flush 0.9 % injection 5-40 mL, 5-40 mL, IntraVENous, PRN, Phuong Bains MD    0.9 % sodium chloride infusion, , IntraVENous, PRN, Phuong Bains MD    ondansetron (ZOFRAN-ODT) disintegrating tablet 4 mg, 4 mg, Oral, Q8H PRN **OR** ondansetron (ZOFRAN)

## 2025-06-17 PROCEDURE — 6360000002 HC RX W HCPCS: Performed by: INTERNAL MEDICINE

## 2025-06-17 PROCEDURE — 97530 THERAPEUTIC ACTIVITIES: CPT

## 2025-06-17 PROCEDURE — 2500000003 HC RX 250 WO HCPCS: Performed by: STUDENT IN AN ORGANIZED HEALTH CARE EDUCATION/TRAINING PROGRAM

## 2025-06-17 PROCEDURE — 2500000003 HC RX 250 WO HCPCS: Performed by: INTERNAL MEDICINE

## 2025-06-17 PROCEDURE — 9990000010 HC NO CHARGE VISIT

## 2025-06-17 PROCEDURE — 6370000000 HC RX 637 (ALT 250 FOR IP): Performed by: INTERNAL MEDICINE

## 2025-06-17 PROCEDURE — 9990000010 HC NO CHARGE VISIT: Performed by: PHYSICAL THERAPIST

## 2025-06-17 PROCEDURE — 97130 THER IVNTJ EA ADDL 15 MIN: CPT

## 2025-06-17 PROCEDURE — 97129 THER IVNTJ 1ST 15 MIN: CPT

## 2025-06-17 PROCEDURE — 99233 SBSQ HOSP IP/OBS HIGH 50: CPT | Performed by: NURSE PRACTITIONER

## 2025-06-17 PROCEDURE — 94760 N-INVAS EAR/PLS OXIMETRY 1: CPT

## 2025-06-17 PROCEDURE — 6360000002 HC RX W HCPCS: Performed by: STUDENT IN AN ORGANIZED HEALTH CARE EDUCATION/TRAINING PROGRAM

## 2025-06-17 PROCEDURE — 1200000000 HC SEMI PRIVATE

## 2025-06-17 PROCEDURE — 6370000000 HC RX 637 (ALT 250 FOR IP): Performed by: NURSE PRACTITIONER

## 2025-06-17 PROCEDURE — 6370000000 HC RX 637 (ALT 250 FOR IP): Performed by: STUDENT IN AN ORGANIZED HEALTH CARE EDUCATION/TRAINING PROGRAM

## 2025-06-17 RX ORDER — CLOPIDOGREL BISULFATE 75 MG/1
75 TABLET ORAL NIGHTLY
Qty: 89 TABLET | Refills: 0
Start: 2025-06-17 | End: 2025-09-14

## 2025-06-17 RX ORDER — QUETIAPINE FUMARATE 25 MG/1
50 TABLET, FILM COATED ORAL NIGHTLY
Status: DISCONTINUED | OUTPATIENT
Start: 2025-06-17 | End: 2025-06-18 | Stop reason: HOSPADM

## 2025-06-17 RX ADMIN — SODIUM CHLORIDE, PRESERVATIVE FREE 10 ML: 5 INJECTION INTRAVENOUS at 10:07

## 2025-06-17 RX ADMIN — ASPIRIN 81 MG: 81 TABLET, CHEWABLE ORAL at 10:06

## 2025-06-17 RX ADMIN — QUETIAPINE FUMARATE 50 MG: 25 TABLET ORAL at 20:57

## 2025-06-17 RX ADMIN — LISINOPRIL 5 MG: 5 TABLET ORAL at 10:06

## 2025-06-17 RX ADMIN — WATER 1000 MG: 1 INJECTION INTRAMUSCULAR; INTRAVENOUS; SUBCUTANEOUS at 18:20

## 2025-06-17 RX ADMIN — ATORVASTATIN CALCIUM 80 MG: 80 TABLET, FILM COATED ORAL at 20:57

## 2025-06-17 RX ADMIN — SERTRALINE 50 MG: 50 TABLET, FILM COATED ORAL at 10:06

## 2025-06-17 RX ADMIN — CLOPIDOGREL BISULFATE 75 MG: 75 TABLET, FILM COATED ORAL at 20:57

## 2025-06-17 RX ADMIN — PANTOPRAZOLE SODIUM 40 MG: 40 TABLET, DELAYED RELEASE ORAL at 10:06

## 2025-06-17 RX ADMIN — LAMOTRIGINE 25 MG: 25 TABLET ORAL at 20:57

## 2025-06-17 RX ADMIN — SODIUM CHLORIDE, PRESERVATIVE FREE 10 ML: 5 INJECTION INTRAVENOUS at 20:57

## 2025-06-17 RX ADMIN — ENOXAPARIN SODIUM 40 MG: 100 INJECTION SUBCUTANEOUS at 20:56

## 2025-06-17 NOTE — PROGRESS NOTES
Facility/Department: [address] ICU   DYSPHAGIA THERAPY and SPEECH / LANGUAGE / COGNITIVE-LINGUISTIC TREATMENT    Patient: Darshana Grace   : 1945   MRN: [id number]      Treatment Date: 2025      Admitting Dx:   Stroke-like symptoms [R29.90]   has a past medical history of Arthritis, Bell's palsy, Cerebral artery occlusion with cerebral infarction (HCC), CKD (chronic kidney disease), Claustrophobia, Diabetes mellitus (HCC), Esophageal reflux, blood clots, Hyperlipidemia, Hypertension, Kidney stone, Nervous, Pulmonary embolism (HCC), and Stuttering.   has a past surgical history that includes Hysterectomy; Appendectomy; Cardiac catheterization; Cholecystectomy; Colonoscopy; Tonsillectomy; knee surgery (Right); blepharoplasty (Bilateral, 2021); Abdomen surgery; and Endoscopy, colon, diagnostic.  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 MD ADMISSION H&P HPI: \"Darshana Grace is a 79 y.o. female with a pmh of CVA, bells palsy, dysarthria brought to hospital with strokelike symptoms.  Patient apparently did not show to the OR today, coworkers checked on her at home and he found to be confused, noted worsening speech compared to baseline and agitation.  Brought to hospital

## 2025-06-17 NOTE — PROGRESS NOTES
Physical Therapy      Darshana Grace  [id number]  [id number]    Attempted to see pt for PT session however pt just finished a telepsych meeting and pt appears to be \"shut down\".  Family/friends attempting to speak with pt but she is turning her head, closing her eyes and saying \"no\".  Will attempt later as schedule permits  Electronically signed by BESSY SIFUENTES PT on 6/17/2025 at 1:46 PM

## 2025-06-17 NOTE — FLOWSHEET NOTE
06/17/25 1251   Treatment Team Notification   Reason for Communication Evaluate  (Patient was working with SLP, shut down and stated, \"I'm an asshole, I'd be better off dead.\")   Name of Team Member Notified Telepsych   Treatment Team Role Consulting Provider   Method of Communication Secure Message   Response Waiting for response   Notification Time 6701

## 2025-06-17 NOTE — PLAN OF CARE
Problem: Pain  Goal: Verbalizes/displays adequate comfort level or baseline comfort level  6/16/2025 2159 by Daylin Singh RN  Outcome: Progressing  6/16/2025 1240 by Jailene Lau  Outcome: Progressing     Problem: Chronic Conditions and Co-morbidities  Goal: Patient's chronic conditions and co-morbidity symptoms are monitored and maintained or improved  6/16/2025 2159 by Daylin Singh RN  Outcome: Progressing  6/16/2025 1240 by Jailene Lau  Outcome: Progressing     Problem: Discharge Planning  Goal: Discharge to home or other facility with appropriate resources  6/16/2025 2159 by Daylin Singh RN  Outcome: Progressing  6/16/2025 1240 by Jailene Lau  Outcome: Progressing     Problem: Safety - Adult  Goal: Free from fall injury  6/16/2025 2159 by Daylin Singh RN  Outcome: Progressing  6/16/2025 1240 by Jailene Lau  Outcome: Progressing     Problem: Skin/Tissue Integrity  Goal: Skin integrity remains intact  Description: 1.  Monitor for areas of redness and/or skin breakdown2.  Assess vascular access sites hourly3.  Every 4-6 hours minimum:  Change oxygen saturation probe site4.  Every 4-6 hours:  If on nasal continuous positive airway pressure, respiratory therapy assess nares and determine need for appliance change or resting period  6/16/2025 2159 by Daylin Singh RN  Outcome: Progressing  6/16/2025 1240 by Jailene Lau  Outcome: Progressing     Problem: Neurosensory - Adult  Goal: Achieves stable or improved neurological status  6/16/2025 2159 by Daylin Singh RN  Outcome: Progressing  6/16/2025 1240 by Jailene Lau  Outcome: Progressing  Goal: Absence of seizures  6/16/2025 2159 by Daylin Singh RN  Outcome: Progressing  6/16/2025 1240 by Jailene Lau  Outcome: Progressing  Goal: Achieves maximal functionality and self care  6/16/2025 2159 by Daylin Singh RN  Outcome: Progressing  6/16/2025 1240 by Jailene Lau  Outcome: Progressing

## 2025-06-17 NOTE — CARE COORDINATION
Discharge planning:   Call received from MD stating patient continues with suicidal ideation and psychiatry recommendation remains inpatient psychiatric placement.     Called and notified Luisa with [facility] of current recommendation.     FARIDEH Mcneill, LSW, Social Work/Case Management   [phone]  Electronically signed by FARIDEH Mcneill on 6/17/2025 at 2:34 PM

## 2025-06-17 NOTE — PROGRESS NOTES
Physical Therapy  Facility/Department: [address] PROGRESSIVE CARE  Rehabilitation Physical Therapy Treatment Note    NAME: Darshana Grace  : 1945 (79 y.o.)  MRN: [id number]  CODE STATUS: DNR-CCA    Date of Service: 25    PT attempted treatment again this PM. Patient up in recliner upon arrival with  present.  Patient with limited eye contact and stating she did not want to participate today. PT encouraged patient to complete some ambulation to try and get moving to help her feel better and she continued to refuse.  PT will try back as  permits.      Taylor Weber, PT, 25 at 3:05 PM

## 2025-06-17 NOTE — VIRTUAL HEALTH
Darshana Grace, was evaluated through a synchronous (real-time) audio-video encounter. The patient (and/or guardian if applicable) is aware that this is a billable service, which includes applicable co-pays. This virtual visit was conducted with patient's (and/or legal guardian's) consent. Patient identification was verified, and a caregiver was present when appropriate.  The patient was located at Facility (Appt Department): [facility] [address] [facility]  [address] [facility] [address]  Loc: [phone]  The provider was located at Home (City/State): [place], [place]  Confirm you are appropriately licensed, registered, or certified to deliver care in the state where the patient is located as indicated above. If you are not or unsure, please re-schedule the visit: Yes, I confirm.   [place] Consult to Tele-pysch Provider  Consult performed by: Mary Liz APRN - CNP  Consult ordered by: Master Garcia I, DO  Reason for consult: Suicidal ideation           Total time spent on this encounter: Not billed by time    --RONN Mojica CNP on 6/17/2025 at 2:02 PM    An electronic signature was used to authenticate this note.    Darshana Grace  [id number]  1945       INPATIENT TELEPSYCHIATRY REASSESSMENT    06/17/25    History  From: Primary RN, EHR, friend BHUMI Romo and patient    Chief Complaint:  “Suicidal ideation”    HPI:   This is a 79 y.o. female patient who is being seen for a reassessment.  Psychiatry was initially consulted on 6/10/2025, she has been reassessed a few times throughout her admission.  The last evaluation was done on 6/15/2025 where she did not participate in interview.  Throughout the admission her mood has fluctuated, there have been periods where she clearly indicated she had suicidal ideation, other days were better where she appeared to be in no psychiatric distress.  She has been declined at multiple places due to her level of  demonstrate no acute abnormality. SINUSES:  The visualized paranasal sinuses and mastoid air cells demonstrate no acute abnormality. SOFT TISSUES/SKULL: No acute abnormality of the visualized skull or soft tissues. CTA NECK: AORTIC ARCH/ARCH VESSELS: No dissection or arterial injury.  No significant stenosis of the brachiocephalic or subclavian arteries. CAROTID ARTERIES: No dissection, arterial injury, or hemodynamically significant stenosis by NASCET criteria. VERTEBRAL ARTERIES: No dissection, arterial injury, or significant stenosis. SOFT TISSUES: The lung apices are clear.  No cervical or superior mediastinal lymphadenopathy.  The larynx and pharynx are unremarkable.  No acute abnormality of the salivary and thyroid glands. BONES: No acute osseous abnormality. CTA HEAD: ANTERIOR CIRCULATION: No significant stenosis of the intracranial internal carotid, anterior cerebral, or middle cerebral arteries. No aneurysm. POSTERIOR CIRCULATION: Complete occlusion of left posterior cerebral artery at the P1/P2 junction.  Focal stenosis is present at the right posterior cerebral artery at the proximal P2 segment.  This is of questionable hemodynamic significance.  Otherwise, no significant stenosis of the basilar or posterior cerebral arteries. No aneurysm. OTHER: No dural venous sinus thrombosis on this non-dedicated study. Incidental note of a subcentimeter epidermal inclusion cyst at the high right posterolateral scalp.     1. No acute intracranial abnormality. 2. Complete occlusion of the left posterior cerebral artery at the P1/P2 junction. 3. Focal stenosis of the right posterior cerebral artery at the proximal P2 segment. This is of questionable hemodynamic significance. 4. No significant stenosis of the cervical carotid or vertebral arteries.     XR ABDOMEN (KUB) (SINGLE AP VIEW)  Result Date: 6/9/2025  EXAM: 1 VIEW XRAY OF THE ABDOMEN SUPINE 06/09/2025 03:20:33 PM COMPARISON: None available. CLINICAL HISTORY:

## 2025-06-17 NOTE — PROGRESS NOTES
Occupational Therapy    [facility] - Occupational Therapy   Phone: [phone]    Occupational Therapy  Facility/Department: [address] PROGRESSIVE CARE      [] Initial Evaluation            [x] Daily Treatment Note         [] Discharge Summary      Patient: Darshana Grace   : 1945   MRN: [id number]   Date of Service:  2025    Staff Mobility Recommendation: RW x1 with gait belt    AM-PAC Score: 16  Discharge Recommendations: 3-5/wk (per chart, plan is inpatient psych)    Admitting Diagnosis:  Stroke-like symptoms  Ordering Physician: Dr. Bains  Current Admission Summary: 79 y.o. female with pertinent PMHx of CVA, CKD, T2DM, HTN, HLD who presented with altered mental status.  She had garbled speech and right-sided facial droop and agitation..  Her imaging in the ED showed concern for left PCA occlusion and severe stenosis of right P2 segment. MRI showed acute infarct in left thalamus and left occipital lobe in the left PCA territory, follow-up. MRI  showing enlargement of left PCA infarct. Pt with suicidal ideation- psych following and recommend inpatient psych    Past Medical History:  has a past medical history of Arthritis, Bell's palsy, Cerebral artery occlusion with cerebral infarction (HCC), CKD (chronic kidney disease), Claustrophobia, Diabetes mellitus (HCC), Esophageal reflux, Hx of blood clots, Hyperlipidemia, Hypertension, Kidney stone, Nervous, Pulmonary embolism (HCC), and Stuttering.  Past Surgical History:  has a past surgical history that includes Hysterectomy; Appendectomy; Cardiac catheterization; Cholecystectomy; Colonoscopy; Tonsillectomy; knee surgery (Right); blepharoplasty (Bilateral, 2021); Abdomen surgery; and Endoscopy, colon, diagnostic.    Assessment  Activity Tolerance: Good  Impairments Requiring Therapeutic Intervention: decreased functional mobility, decreased ADL status, decreased strength, decreased safety awareness, decreased cognition, decreased  Yes  Prior Level of Assist for Ambulation: Independent household ambulator, with or without device (no device)  Prior Level of Assist for Transfers: Independent  Active : Yes  Type of Occupation: cleaning, answering phones    Available Assistance at Discharge: available PRN    Examination   Vision:   Vision: Impaired  Vision Exceptions: Wears glasses for reading (appears blind in R eye- reports seeig occas shadows)  Hearing:   Hearing: Within functional limits     Observation:   General Observation:   L facial droop  Posture:   Fair  Sensation:   WFL  Coordination Testing:   Decreased speed and coordination observed with finger opposition but WFL    ROM:   (B) UE AROM WFL; RUE requires inc time to achieve full range  Strength:   (B) UE strength grossly WFL    Therapist Clinical Decision Making (Complexity): medium complexity       Activities of Daily Living  Basic Activities of Daily Living  Grooming: contact guard assistance minimal assistance  Grooming Comments: in stance at sink to complete washing hands; pt able to locate objects on right side of sink with cuing  Lower Extremity Dressing: moderate assistance maximum assistance  Dressing Comments: pt requires maxA to thread BLEs through brief seated on commode and able to pull up over hips in stance with min/modA  Toileting: minimal assistance.    Toileting Comments: pt incontinent of urine and requires assist to change brief; pt then voids more urine seated on commode and completes pericare with SBA and pant management up/down with min/modA   General Comments: n/a  Instrumental Activities of Daily Living  No IADL completed on this date.    Functional Mobility  Bed Mobility:  Bed mobility not completed on this date.  Transfers:  Sit to stand transfer:minimal assistance  Stand to sit transfer: minimal assistance  Toilet transfer: minimal assistance  Toilet transfer equipment: standard toilet, grab bars  Toilet transfer comments: cues for hand placement on

## 2025-06-17 NOTE — DISCHARGE SUMMARY
V2.0  Discharge Summary    Name:  Darshana Grace /Age/Sex: 1945 (79 y.o. female)   Admit Date: 2025  Discharge Date: 25    MRN & CSN:  [id number] & [id number] Encounter Date and Time 25 2:48 PM EDT    Attending:  Master Garcia DO Discharging Provider: Master Garcia DO       Hospital Course:     Brief HPI: Darshana Grace is a 79 y.o. female with a pertinent PMHx of CVA, CKD, T2DM, HTN, HLD who presented to altered mental status.  She had garbled speech and right-sided facial droop and agitation.  Her imaging in the ED showed concern for left PCA occlusion and severe stenosis of the right P2 segment.  She was admitted for further care and possible acute CVA.    She had originally planned to discharge to acute rehab for post her care and rehabilitation however she unfortunately has been severely depressed and was endorsing active suicidal ideation with plan and therefore inpatient psychiatry has been recommended.    Brief Problem Based Course:     Acute CVA  - MRI showed acute infarct in left thalamus and left occipital lobe in the left PCA territory, follow-up MRI  showed enlargement of the left PCA infarct  - Her CTA showed complete occlusion of the left PCA in the P1 segment  - Aspirin 81 mg daily  - Plavix 75 mg daily for 90 days  - Atorvastatin 80 mg nightly  - Outpatient follow-up with neurology in 4 to 6 weeks  - Medically stable for discharge    Urinary tract infection  - Urine culture with growth of pansensitive E. coli and Proteus  - IV Rocephin was transitioned to oral Augmentin to complete antibiotic course  - Medically stable for discharge    Depression  Suicidal ideation  - Has active suicidal ideation and plan therefore under involuntary hold  - Psychiatry has been following, recommending inpatient psychiatry care at discharge  - Continue Lamictal and sertraline    Acute metabolic encephalopathy, improved  - Suspect secondary to underlying infection    Acute  Multiplanar multisequence MRI of the head/brain was performed without the administration of intravenous contrast. COMPARISON: MRI brain 06/10/2025. CLINICAL HISTORY: Altered mental status; recent CVA. FINDINGS: BRAIN AND VENTRICLES: There is interval progression of the left PCA (posterior cerebral artery) territory infarct including involvement of the left thalamus which has increased in size compared to the prior exam. No significant mass effect or midline shift. Areas of T2 FLAIR hyperintensity within the periventricular and subcortical white matter, which is nonspecific, but may represent chronic microvascular ischemic changes. There is minimal global parenchyma volume loss. No acute hemorrhage. No hydrocephalus. The sella is unremarkable. Normal flow voids. ORBITS: No acute abnormality. SINUSES AND MASTOIDS: No acute abnormality. BONES AND SOFT TISSUES: Normal marrow signal. No acute soft tissue abnormality.     1. Interval progression of the left PCA territory infarct including involvement of the left thalamus, increased in size compared to the prior exam. No significant mass effect or midline shift. 2. No acute hemorrhage. 3. Mild-to-moderate chronic microvascular ischemic changes.     CT HEAD WO CONTRAST  Result Date: 6/14/2025  EXAM: CT HEAD WITHOUT CONTRAST 06/14/2025 12:24:33 PM TECHNIQUE: CT of the head was performed without the administration of intravenous contrast. Automated exposure control, iterative reconstruction, and/or weight based adjustment of the mA/kV was utilized to reduce the radiation dose to as low as reasonably achievable. COMPARISON: 06/09/2025 and MRI brain 06/10/2025 CLINICAL HISTORY: Altered mental status. FINDINGS: BRAIN AND VENTRICLES: There is progressive hypoattenuation within the left occipital lobe consistent with a subacute infarct. There is no acute intracranial hemorrhage. There is no mass effect or midline shift. Periventricular hypoattenuation is present. No

## 2025-06-17 NOTE — CARE COORDINATION
Voicemail received from Nini ASHER at [facility] requesting [place] with [facility] assessment.   Secure fax sent with 72 hour hold and [facility] assessment to #[phone].   Called to [facility] #[phone], spoke to Bren, confirmed they have received fax.     Will follow and assist with inpatient psychiatric placement.     FARIDEH Mcneill, LSW, Social Work/Case Management   [phone]  Electronically signed by FARIDEH Mcneill on 6/17/2025 at 8:39 AM    [facility] - Marjorie RN with the transfer center.   Requested facesheet, H&P, lab results, and crisis consult.     Fax:  [phone]    Spoke to Marjorie with [facility]: [facility] - unless patient is able to do their own ADLs, cannot accept.     Electronically signed by FARIDEH Mcneill on 6/17/2025 at 9:51 AM    Spoke to Dr Garcia, confirmed Psych re-consulted.   Spoke to Fredy ASHER who confirmed psychiatry was reconsulted.   Electronically signed by FARIDEH Mcneill on 6/17/2025 at 11:41 AM

## 2025-06-18 VITALS
HEIGHT: 67 IN | SYSTOLIC BLOOD PRESSURE: 140 MMHG | OXYGEN SATURATION: 96 % | WEIGHT: 154.32 LBS | TEMPERATURE: 98.4 F | HEART RATE: 67 BPM | RESPIRATION RATE: 16 BRPM | BODY MASS INDEX: 24.22 KG/M2 | DIASTOLIC BLOOD PRESSURE: 77 MMHG

## 2025-06-18 PROCEDURE — 97530 THERAPEUTIC ACTIVITIES: CPT | Performed by: PHYSICAL THERAPIST

## 2025-06-18 PROCEDURE — 94760 N-INVAS EAR/PLS OXIMETRY 1: CPT

## 2025-06-18 PROCEDURE — 6370000000 HC RX 637 (ALT 250 FOR IP): Performed by: INTERNAL MEDICINE

## 2025-06-18 PROCEDURE — 2500000003 HC RX 250 WO HCPCS: Performed by: INTERNAL MEDICINE

## 2025-06-18 PROCEDURE — 6370000000 HC RX 637 (ALT 250 FOR IP): Performed by: STUDENT IN AN ORGANIZED HEALTH CARE EDUCATION/TRAINING PROGRAM

## 2025-06-18 RX ADMIN — SERTRALINE 50 MG: 50 TABLET, FILM COATED ORAL at 09:45

## 2025-06-18 RX ADMIN — SODIUM CHLORIDE, PRESERVATIVE FREE 10 ML: 5 INJECTION INTRAVENOUS at 09:48

## 2025-06-18 RX ADMIN — ASPIRIN 81 MG: 81 TABLET, CHEWABLE ORAL at 09:45

## 2025-06-18 RX ADMIN — LISINOPRIL 5 MG: 5 TABLET ORAL at 09:45

## 2025-06-18 RX ADMIN — PANTOPRAZOLE SODIUM 40 MG: 40 TABLET, DELAYED RELEASE ORAL at 09:45

## 2025-06-18 NOTE — PLAN OF CARE
Problem: Pain  Goal: Verbalizes/displays adequate comfort level or baseline comfort level  6/17/2025 2238 by Daylin Singh RN  Outcome: Progressing  6/17/2025 1735 by Fredy Hickey RN  Outcome: Progressing     Problem: Chronic Conditions and Co-morbidities  Goal: Patient's chronic conditions and co-morbidity symptoms are monitored and maintained or improved  6/17/2025 2238 by Daylin Singh RN  Outcome: Progressing  6/17/2025 1735 by Fredy Hickey RN  Outcome: Progressing     Problem: Discharge Planning  Goal: Discharge to home or other facility with appropriate resources  6/17/2025 2238 by Daylin Singh RN  Outcome: Progressing  6/17/2025 1735 by Fredy Hickey RN  Outcome: Progressing     Problem: Safety - Adult  Goal: Free from fall injury  6/17/2025 2238 by Daylin Singh RN  Outcome: Progressing  6/17/2025 1735 by Fredy Hickey RN  Outcome: Progressing     Problem: Skin/Tissue Integrity  Goal: Skin integrity remains intact  Description: 1.  Monitor for areas of redness and/or skin breakdown2.  Assess vascular access sites hourly3.  Every 4-6 hours minimum:  Change oxygen saturation probe site4.  Every 4-6 hours:  If on nasal continuous positive airway pressure, respiratory therapy assess nares and determine need for appliance change or resting period  6/17/2025 2238 by Daylin Singh RN  Outcome: Progressing  6/17/2025 1735 by Fredy Hickey RN  Outcome: Progressing     Problem: Neurosensory - Adult  Goal: Achieves stable or improved neurological status  6/17/2025 2238 by Daylin Singh RN  Outcome: Progressing  6/17/2025 1735 by Fredy Hickey RN  Outcome: Progressing  Goal: Absence of seizures  6/17/2025 2238 by Daylin Singh RN  Outcome: Progressing  6/17/2025 1735 by Fredy Hickey RN  Outcome: Progressing  Goal: Achieves maximal functionality and self care  6/17/2025 2238 by Daylin Singh RN  Outcome: Progressing  6/17/2025 1735 by Fredy Hickey RN  Outcome: Progressing     Problem: Confusion  Goal:

## 2025-06-18 NOTE — CARE COORDINATION
Case Management Discharge Note          Date / Time of Note: 6/18/2025 12:26 PM                  Patient Name: Darshana Grace   YOB: 1945  Diagnosis: Stroke-like symptoms [R29.90]   Date / Time: 6/9/2025 11:58 AM    Financial:  Payor: [employer] ADVANTAGE / Plan: [employer] ADVANT[employer] CHOICE HMO / Product Type: *No Product type* /      Pharmacy:    [facility] [id number] - [facility] [address] - P [phone] - F [phone]  [id number] [place] [address]  Phone: [phone] Fax: [phone]      Assistance purchasing medications?: Potential Assistance Purchasing Medications: No  Assistance provided by Case Management: None at this time    DISCHARGE Disposition: Inpatient Psc:  [facility] Psychiatry Phone: [phone] Fax:        Transportation:  Transportation PLAN for discharge: EMS transportation   Mode of Transport: Ambulance stretcher - BLS  Reason for medical transport: Bed confined: Meets the following criteria 1) unable to get out of bed without assistance or ambulate, 2) unable to safely sit up in a wheelchair, 3) unable to maintain erect seating position in a chair for time needed for transport and Other: CVA  Name of Transport Company: Not Applicable  Time of Transport: 1300    Transport form completed: Yes      Additional CM Notes: discharge to [facility] for Psychiatry   Called to Lorene Hoyos [phone] - [employer]IP[employer]compliant voicemail left notifying of discharge for a patient, requested return phone call.   Called to Maddy #[phone], notified of discharge today to [facility] for Psychiatry.   Notified RN of discharge plan, and notified that I attempted to notify emergency contacts with update.    The Plan for Transition of Care is related to the following treatment goals of Stroke-like symptoms [R29.90]    FARIDEH Mcneill  [facility]   Case Management Department  Ph:  [phone]  Electronically

## 2025-06-18 NOTE — CARE COORDINATION
Discharge order acknowledged.   Call received from Transfer center stating they need an updated psychiatric hold documentation.   Requested from MD.   FARIDEH Mcneill, LSW, Social Work/Case Management   [phone]  Electronically signed by FARIDEH Mcneill on 6/18/2025 at 9:01 AM      Voicemail received from Karishma ASHER with [facility].   Spoke to ACC Unit Coordinator who confirmed secure fax of pink slip had been sent.   Called to Karishma ASHER [phone], notified that Pink slip had been sent via secure fax. Transferred telephone to [address] [place] [address] for ACC Unit Coordinator.  Electronically signed by FARIDEH Mcneill on 6/18/2025 at 11:33 AM

## 2025-06-18 NOTE — PROGRESS NOTES
Pt discharge to [facility] per MD order. Report called and given to LAKESHIA Avalos. Annabelle Hoyos (pt's POA) made aware of transfer and given number to facility. Pt denies nay further needs at this time.

## 2025-06-18 NOTE — PROGRESS NOTES
Brief Neurology note:     Spoke with RN. Patient has discharge order in and is awaiting coordination of transportation. No concerns for neurology.     Neurology signing off. Please call back with any questions or concerns.     Ivonne Maxwell CNP  Neurology.

## 2025-06-18 NOTE — PLAN OF CARE
Problem: Pain  Goal: Verbalizes/displays adequate comfort level or baseline comfort level  Outcome: Progressing     Problem: Discharge Planning  Goal: Discharge to home or other facility with appropriate resources  Outcome: Progressing     Problem: Safety - Adult  Goal: Free from fall injury  Outcome: Progressing     Problem: Skin/Tissue Integrity  Goal: Skin integrity remains intact  Description: 1.  Monitor for areas of redness and/or skin breakdown2.  Assess vascular access sites hourly3.  Every 4-6 hours minimum:  Change oxygen saturation probe site4.  Every 4-6 hours:  If on nasal continuous positive airway pressure, respiratory therapy assess nares and determine need for appliance change or resting period  Outcome: Progressing     Problem: Skin/Tissue Integrity - Adult  Goal: Skin integrity remains intact  Description: 1.  Monitor for areas of redness and/or skin breakdown2.  Assess vascular access sites hourly3.  Every 4-6 hours minimum:  Change oxygen saturation probe site4.  Every 4-6 hours:  If on nasal continuous positive airway pressure, respiratory therapy assess nares and determine need for appliance change or resting period  Outcome: Progressing

## 2025-06-18 NOTE — PROGRESS NOTES
[facility] - Physical Therapy   Phone: [phone]    Physical Therapy  Facility/Department: [address] PROGRESSIVE CARE      [] Initial Evaluation            [x] Daily Treatment Note         [] Discharge Summary      Patient: Darshana Grace   : 1945   MRN: [id number]   Date of Service:  2025  Staff Mobility Recommendation: RW x 1 c belt    AM-PAC score:   Discharge Recommendations: ARU    Admitting Diagnosis: Stroke-like symptoms  Ordering Physician: Phuong Bains MD on 25  Current Admission Summary: \"Darshana Grace is a 79 y.o. female presenting to the ED for altered mental status.  Patient denies show to work today therefore a welfare check was done.  Patient was found disoriented with inability to follow commands and answer questions appropriately.  Patient has a past medical history of Bell's palsy and stroke with residual lower face droop.  Patient does have dysarthria at baseline however family states her dysarthria is worse compared to her baseline.  Patient denies chest pain or shortness of breath.  Patient is agitated and noncompliant with examination and interview.  Last well-known was potentially sometime yesterday.\" 6-16 MRI (+) Interval progression of the left PCA territory infarct including involvement of the left thalamus, increased in size compared to the prior exam.     Past Medical History:  has a past medical history of Arthritis, Bell's palsy, Cerebral artery occlusion with cerebral infarction (HCC), CKD (chronic kidney disease), Claustrophobia, Diabetes mellitus (HCC), Esophageal reflux, Hx of blood clots, Hyperlipidemia, Hypertension, Kidney stone, Nervous, Pulmonary embolism (HCC), and Stuttering.  Past Surgical History:  has a past surgical history that includes Hysterectomy; Appendectomy; Cardiac catheterization; Cholecystectomy; Colonoscopy; Tonsillectomy; knee surgery (Right); blepharoplasty (Bilateral, 2021); Abdomen surgery; and Endoscopy,  phones    Available Assistance at Discharge: none    Examination       Functional Mobility  Bed Mobility:  Bed mobility not completed on this date.    Transfers:  Sit to stand transfer: contact guard assistance  Stand to sit transfer: contact guard assistance  Comments: cues for hand placement on RW and safety issues with walker. Pt does not scan to R and with field cut has difficulty finding objects on R    Ambulation:  Surface:level surface  Assistive Device: rolling walker  Assistance: contact guard assistance  Distance: 25'x2 and 3'  Gait Mechanics: Slow javier  Decreased step length  Decreased step height  Shuffles  Unsteady throughout trial  Comments: Pt used commode to urinate.  Stood at sink to wash her hands and wash her face.  Needed assist locating soap and towel.  Pt set up in chair with LEs elevated and pillows/waffle cushion in place for support and pressure relief; all needs in reach; set up for breakfast tray  Attempted to take a few steps without RW as pt turned from sink and did not remember to use her walker.  However pt too unsteady to continue therefore walker pulled over to pt for safety  Balance:  Static Sitting Balance: fair (+): maintains balance at SBA/supervision without use of UE support  Static Standing Balance: fair (-): maintains balance at CGA with use of UE support  Dynamic Standing Balance: fair (-): maintains balance at CGA with use of UE support    Exercise:   No exercises performed this session.       Cognition  Overall Cognitive Status: Impaired  Arousal/Alertness: appropriate responses to stimuli  Following Commands: follows one step commands with repetition, follows one step commands with increased time  Attention Span: attends with cues to redirect  Safety Judgement: decreased awareness of need for assistance, decreased awareness of need for safety  Insights: decreased awareness of deficits  Initiation: requires cues for some  Orientation:    oriented to person, oriented to

## 2025-06-18 NOTE — PROGRESS NOTES
SLP NOTE    Treatment attempted. Patient not responsive to attempt at this time. ST to re-attempt as schedule permits pending status/response unless otherwise notified.    Thank you.  Eusebia Kennedy MA, CCC-SLP, #[id number]  Speech-Language Pathologist  Portable phone: [phone]

## 2025-06-18 NOTE — PROGRESS NOTES
V2.0    [facility] Progress Note      Name:  Darshana Grace /Age/Sex: 1945  (79 y.o. female)   MRN & CSN:  [id number] & [id number] Encounter Date/Time: 2025 8:22 AM EDT   Location:  [id number] PCP: Eugenia Lamb MD     Attending:Master Garcia DO       Hospital Day: 10  Brief Hospital Course  Darshana Grace is a 79 y.o. female with pertinent PMHx of CVA, CKD, T2DM, HTN, HLD who presented with altered mental status.  She had garbled speech and right-sided facial droop and agitation.  Imaging in the ED showed concern for left PCA occlusion and severe stenosis of right P2 segment.  She was admitted for further care and possible acute CVA.  MRI confirmed acute CVA.    Originally was planning for discharge to acute rehab however she endorsed active suicidal ideation with suicidal plan.  Psychiatry recommended inpatient psychiatry at discharge.    Assessment & Plan     Acute CVA  - MRI showed acute infarct in left thalamus and left occipital lobe in the left PCA territory  - Aspirin 81 mg daily  - Plavix 75 mg daily for 90 days  - Atorvastatin 80 mg nightly  - Outpatient follow-up with neurology in 4 to 6 weeks  - Medically stable for discharge    Urinary tract infection  - Urine culture was with growth of pansensitive E. coli and Proteus  - IV Rocephin while inpatient with plan to transition to oral Augmentin to complete antibiotic course at discharge    Depression  Suicidal ideation  - Continues to have active suicidal ideation and plan therefore under an involuntary hold  - Psychiatry following, recommending inpatient psychiatry care at discharge  - Continue Lamictal and sertraline      Diet ADULT DIET; Easy to Chew   DVT Prophylaxis [x] Lovenox, []  Heparin, [] SCDs, [] Ambulation,  [] Eliquis, [] Xarelto  [] Coumadin   Code Status DNR-CCA   Disposition From: Home  Expected Disposition: Psychiatry  Estimated Date of Discharge: Medically stable for discharge pending placement

## 2025-07-01 ENCOUNTER — APPOINTMENT (OUTPATIENT)
Dept: CT IMAGING | Age: 80
End: 2025-07-01
Payer: MEDICARE

## 2025-07-01 ENCOUNTER — HOSPITAL ENCOUNTER (INPATIENT)
Age: 80
LOS: 9 days | Discharge: NURSING, REHAB OR LONG-TERM CARE | End: 2025-07-11
Attending: STUDENT IN AN ORGANIZED HEALTH CARE EDUCATION/TRAINING PROGRAM | Admitting: HOSPITALIST
Payer: MEDICARE

## 2025-07-01 DIAGNOSIS — G45.9 TIA (TRANSIENT ISCHEMIC ATTACK): Primary | ICD-10-CM

## 2025-07-01 LAB
ALBUMIN SERPL-MCNC: 3.5 G/DL (ref 3.4–5)
ALBUMIN/GLOB SERPL: 1.5 {RATIO} (ref 1.1–2.2)
ALP SERPL-CCNC: 117 U/L (ref 40–129)
ALT SERPL-CCNC: 35 U/L (ref 10–40)
ANION GAP SERPL CALCULATED.3IONS-SCNC: 14 MMOL/L (ref 3–16)
AST SERPL-CCNC: 44 U/L (ref 15–37)
BACTERIA URNS QL MICRO: ABNORMAL /HPF
BASOPHILS # BLD: 0.1 K/UL (ref 0–0.2)
BASOPHILS NFR BLD: 0.6 %
BILIRUB SERPL-MCNC: 1.1 MG/DL (ref 0–1)
BILIRUB UR QL STRIP.AUTO: NEGATIVE
BUDDING YEAST: PRESENT
BUN SERPL-MCNC: 28 MG/DL (ref 7–20)
CALCIUM SERPL-MCNC: 9.2 MG/DL (ref 8.3–10.6)
CHLORIDE SERPL-SCNC: 100 MMOL/L (ref 99–110)
CLARITY UR: ABNORMAL
CO2 SERPL-SCNC: 25 MMOL/L (ref 21–32)
COLOR UR: YELLOW
CREAT SERPL-MCNC: 0.9 MG/DL (ref 0.6–1.2)
DEPRECATED RDW RBC AUTO: 12.7 % (ref 12.4–15.4)
EOSINOPHIL # BLD: 0 K/UL (ref 0–0.6)
EOSINOPHIL NFR BLD: 0.2 %
EPI CELLS #/AREA URNS AUTO: 4 /HPF (ref 0–5)
GFR SERPLBLD CREATININE-BSD FMLA CKD-EPI: 65 ML/MIN/{1.73_M2}
GLUCOSE BLD-MCNC: 71 MG/DL
GLUCOSE BLD-MCNC: 71 MG/DL (ref 70–99)
GLUCOSE SERPL-MCNC: 79 MG/DL (ref 70–99)
GLUCOSE UR STRIP.AUTO-MCNC: NEGATIVE MG/DL
HCT VFR BLD AUTO: 41.6 % (ref 36–48)
HGB BLD-MCNC: 14.2 G/DL (ref 12–16)
HGB UR QL STRIP.AUTO: NEGATIVE
HYALINE CASTS #/AREA URNS AUTO: 4 /LPF (ref 0–8)
INR PPP: 1.04 (ref 0.86–1.14)
KETONES UR STRIP.AUTO-MCNC: 15 MG/DL
LEUKOCYTE ESTERASE UR QL STRIP.AUTO: ABNORMAL
LYMPHOCYTES # BLD: 1.3 K/UL (ref 1–5.1)
LYMPHOCYTES NFR BLD: 10.6 %
MCH RBC QN AUTO: 30.5 PG (ref 26–34)
MCHC RBC AUTO-ENTMCNC: 34.1 G/DL (ref 31–36)
MCV RBC AUTO: 89.6 FL (ref 80–100)
MONOCYTES # BLD: 0.9 K/UL (ref 0–1.3)
MONOCYTES NFR BLD: 7.2 %
MUCUS: PRESENT
NEUTROPHILS # BLD: 9.7 K/UL (ref 1.7–7.7)
NEUTROPHILS NFR BLD: 81.4 %
NITRITE UR QL STRIP.AUTO: POSITIVE
PERFORMED ON: NORMAL
PH UR STRIP.AUTO: 5.5 [PH] (ref 5–8)
PLATELET # BLD AUTO: 161 K/UL (ref 135–450)
PMV BLD AUTO: 9.2 FL (ref 5–10.5)
POTASSIUM SERPL-SCNC: 3.6 MMOL/L (ref 3.5–5.1)
PROT SERPL-MCNC: 5.8 G/DL (ref 6.4–8.2)
PROT UR STRIP.AUTO-MCNC: ABNORMAL MG/DL
PROTHROMBIN TIME: 13.9 SEC (ref 12.1–14.9)
RBC # BLD AUTO: 4.65 M/UL (ref 4–5.2)
RBC CLUMPS #/AREA URNS AUTO: 0 /HPF (ref 0–4)
SODIUM SERPL-SCNC: 139 MMOL/L (ref 136–145)
SP GR UR STRIP.AUTO: 1.02 (ref 1–1.03)
TROPONIN, HIGH SENSITIVITY: 18 NG/L (ref 0–14)
UA COMPLETE W REFLEX CULTURE PNL UR: ABNORMAL
UA DIPSTICK W REFLEX MICRO PNL UR: YES
URN SPEC COLLECT METH UR: ABNORMAL
UROBILINOGEN UR STRIP-ACNC: 0.2 E.U./DL
WBC # BLD AUTO: 12 K/UL (ref 4–11)
WBC #/AREA URNS AUTO: 3 /HPF (ref 0–5)

## 2025-07-01 PROCEDURE — 80053 COMPREHEN METABOLIC PANEL: CPT

## 2025-07-01 PROCEDURE — 70450 CT HEAD/BRAIN W/O DYE: CPT

## 2025-07-01 PROCEDURE — 70498 CT ANGIOGRAPHY NECK: CPT

## 2025-07-01 PROCEDURE — 2500000003 HC RX 250 WO HCPCS: Performed by: STUDENT IN AN ORGANIZED HEALTH CARE EDUCATION/TRAINING PROGRAM

## 2025-07-01 PROCEDURE — 81001 URINALYSIS AUTO W/SCOPE: CPT

## 2025-07-01 PROCEDURE — 85025 COMPLETE CBC W/AUTO DIFF WBC: CPT

## 2025-07-01 PROCEDURE — 6360000004 HC RX CONTRAST MEDICATION: Performed by: STUDENT IN AN ORGANIZED HEALTH CARE EDUCATION/TRAINING PROGRAM

## 2025-07-01 PROCEDURE — 93005 ELECTROCARDIOGRAM TRACING: CPT | Performed by: PHYSICIAN ASSISTANT

## 2025-07-01 PROCEDURE — 6360000002 HC RX W HCPCS: Performed by: STUDENT IN AN ORGANIZED HEALTH CARE EDUCATION/TRAINING PROGRAM

## 2025-07-01 PROCEDURE — 84484 ASSAY OF TROPONIN QUANT: CPT

## 2025-07-01 PROCEDURE — 96374 THER/PROPH/DIAG INJ IV PUSH: CPT

## 2025-07-01 PROCEDURE — 85610 PROTHROMBIN TIME: CPT

## 2025-07-01 PROCEDURE — 99285 EMERGENCY DEPT VISIT HI MDM: CPT

## 2025-07-01 RX ORDER — ASPIRIN 81 MG/1
243 TABLET, CHEWABLE ORAL ONCE
Status: DISCONTINUED | OUTPATIENT
Start: 2025-07-01 | End: 2025-07-02

## 2025-07-01 RX ORDER — IOPAMIDOL 755 MG/ML
75 INJECTION, SOLUTION INTRAVASCULAR
Status: COMPLETED | OUTPATIENT
Start: 2025-07-01 | End: 2025-07-01

## 2025-07-01 RX ADMIN — WATER 1000 MG: 1 INJECTION INTRAMUSCULAR; INTRAVENOUS; SUBCUTANEOUS at 23:58

## 2025-07-01 RX ADMIN — IOPAMIDOL 75 ML: 755 INJECTION, SOLUTION INTRAVENOUS at 20:49

## 2025-07-01 ASSESSMENT — PAIN - FUNCTIONAL ASSESSMENT: PAIN_FUNCTIONAL_ASSESSMENT: FACE, LEGS, ACTIVITY, CRY, AND CONSOLABILITY (FLACC)

## 2025-07-02 LAB
EKG ATRIAL RATE: 60 BPM
EKG ATRIAL RATE: 66 BPM
EKG DIAGNOSIS: NORMAL
EKG DIAGNOSIS: NORMAL
EKG P AXIS: 36 DEGREES
EKG P AXIS: 77 DEGREES
EKG P-R INTERVAL: 180 MS
EKG P-R INTERVAL: 188 MS
EKG Q-T INTERVAL: 464 MS
EKG Q-T INTERVAL: 496 MS
EKG QRS DURATION: 152 MS
EKG QRS DURATION: 154 MS
EKG QTC CALCULATION (BAZETT): 486 MS
EKG QTC CALCULATION (BAZETT): 496 MS
EKG R AXIS: -57 DEGREES
EKG R AXIS: -60 DEGREES
EKG T AXIS: 0 DEGREES
EKG T AXIS: 1 DEGREES
EKG VENTRICULAR RATE: 60 BPM
EKG VENTRICULAR RATE: 66 BPM
TROPONIN, HIGH SENSITIVITY: 15 NG/L (ref 0–14)
TROPONIN, HIGH SENSITIVITY: 17 NG/L (ref 0–14)
TROPONIN, HIGH SENSITIVITY: 18 NG/L (ref 0–14)
TROPONIN, HIGH SENSITIVITY: 20 NG/L (ref 0–14)

## 2025-07-02 PROCEDURE — 97530 THERAPEUTIC ACTIVITIES: CPT | Performed by: PHYSICAL THERAPIST

## 2025-07-02 PROCEDURE — 93005 ELECTROCARDIOGRAM TRACING: CPT | Performed by: HOSPITALIST

## 2025-07-02 PROCEDURE — 92523 SPEECH SOUND LANG COMPREHEN: CPT

## 2025-07-02 PROCEDURE — 94760 N-INVAS EAR/PLS OXIMETRY 1: CPT

## 2025-07-02 PROCEDURE — 84484 ASSAY OF TROPONIN QUANT: CPT

## 2025-07-02 PROCEDURE — 93010 ELECTROCARDIOGRAM REPORT: CPT | Performed by: STUDENT IN AN ORGANIZED HEALTH CARE EDUCATION/TRAINING PROGRAM

## 2025-07-02 PROCEDURE — 97166 OT EVAL MOD COMPLEX 45 MIN: CPT

## 2025-07-02 PROCEDURE — 92610 EVALUATE SWALLOWING FUNCTION: CPT

## 2025-07-02 PROCEDURE — 2500000003 HC RX 250 WO HCPCS: Performed by: HOSPITALIST

## 2025-07-02 PROCEDURE — 97129 THER IVNTJ 1ST 15 MIN: CPT

## 2025-07-02 PROCEDURE — 97535 SELF CARE MNGMENT TRAINING: CPT

## 2025-07-02 PROCEDURE — 1200000000 HC SEMI PRIVATE

## 2025-07-02 PROCEDURE — 6370000000 HC RX 637 (ALT 250 FOR IP): Performed by: NURSE PRACTITIONER

## 2025-07-02 PROCEDURE — 6360000002 HC RX W HCPCS: Performed by: HOSPITALIST

## 2025-07-02 PROCEDURE — 97162 PT EVAL MOD COMPLEX 30 MIN: CPT | Performed by: PHYSICAL THERAPIST

## 2025-07-02 PROCEDURE — 6370000000 HC RX 637 (ALT 250 FOR IP): Performed by: HOSPITALIST

## 2025-07-02 PROCEDURE — 36415 COLL VENOUS BLD VENIPUNCTURE: CPT

## 2025-07-02 PROCEDURE — 97116 GAIT TRAINING THERAPY: CPT | Performed by: PHYSICAL THERAPIST

## 2025-07-02 RX ORDER — ENOXAPARIN SODIUM 100 MG/ML
40 INJECTION SUBCUTANEOUS DAILY
Status: DISCONTINUED | OUTPATIENT
Start: 2025-07-02 | End: 2025-07-11 | Stop reason: HOSPADM

## 2025-07-02 RX ORDER — POLYETHYLENE GLYCOL 3350 17 G/17G
17 POWDER, FOR SOLUTION ORAL DAILY PRN
Status: DISCONTINUED | OUTPATIENT
Start: 2025-07-02 | End: 2025-07-11 | Stop reason: HOSPADM

## 2025-07-02 RX ORDER — ASPIRIN 81 MG/1
81 TABLET, CHEWABLE ORAL DAILY
Status: DISCONTINUED | OUTPATIENT
Start: 2025-07-02 | End: 2025-07-11 | Stop reason: HOSPADM

## 2025-07-02 RX ORDER — ATORVASTATIN CALCIUM 80 MG/1
80 TABLET, FILM COATED ORAL NIGHTLY
Status: DISCONTINUED | OUTPATIENT
Start: 2025-07-02 | End: 2025-07-11 | Stop reason: HOSPADM

## 2025-07-02 RX ORDER — VITAMIN B COMPLEX
2000 TABLET ORAL DAILY
Status: DISCONTINUED | OUTPATIENT
Start: 2025-07-02 | End: 2025-07-11 | Stop reason: HOSPADM

## 2025-07-02 RX ORDER — LISINOPRIL 5 MG/1
5 TABLET ORAL DAILY
Status: DISCONTINUED | OUTPATIENT
Start: 2025-07-02 | End: 2025-07-11 | Stop reason: HOSPADM

## 2025-07-02 RX ORDER — ONDANSETRON 2 MG/ML
4 INJECTION INTRAMUSCULAR; INTRAVENOUS EVERY 6 HOURS PRN
Status: DISCONTINUED | OUTPATIENT
Start: 2025-07-02 | End: 2025-07-11 | Stop reason: HOSPADM

## 2025-07-02 RX ORDER — CLOPIDOGREL BISULFATE 75 MG/1
75 TABLET ORAL NIGHTLY
Status: DISCONTINUED | OUTPATIENT
Start: 2025-07-02 | End: 2025-07-02

## 2025-07-02 RX ORDER — SODIUM CHLORIDE 9 MG/ML
INJECTION, SOLUTION INTRAVENOUS PRN
Status: DISCONTINUED | OUTPATIENT
Start: 2025-07-02 | End: 2025-07-11 | Stop reason: HOSPADM

## 2025-07-02 RX ORDER — PANTOPRAZOLE SODIUM 40 MG/1
40 TABLET, DELAYED RELEASE ORAL
Status: DISCONTINUED | OUTPATIENT
Start: 2025-07-02 | End: 2025-07-11 | Stop reason: HOSPADM

## 2025-07-02 RX ORDER — LAMOTRIGINE 25 MG/1
25 TABLET ORAL NIGHTLY
Status: DISCONTINUED | OUTPATIENT
Start: 2025-07-02 | End: 2025-07-11 | Stop reason: HOSPADM

## 2025-07-02 RX ORDER — CLOPIDOGREL BISULFATE 75 MG/1
75 TABLET ORAL DAILY
Status: DISCONTINUED | OUTPATIENT
Start: 2025-07-02 | End: 2025-07-11 | Stop reason: HOSPADM

## 2025-07-02 RX ORDER — SODIUM CHLORIDE 0.9 % (FLUSH) 0.9 %
5-40 SYRINGE (ML) INJECTION EVERY 12 HOURS SCHEDULED
Status: DISCONTINUED | OUTPATIENT
Start: 2025-07-02 | End: 2025-07-11 | Stop reason: HOSPADM

## 2025-07-02 RX ORDER — ONDANSETRON 4 MG/1
4 TABLET, ORALLY DISINTEGRATING ORAL EVERY 8 HOURS PRN
Status: DISCONTINUED | OUTPATIENT
Start: 2025-07-02 | End: 2025-07-11 | Stop reason: HOSPADM

## 2025-07-02 RX ORDER — SODIUM CHLORIDE 0.9 % (FLUSH) 0.9 %
5-40 SYRINGE (ML) INJECTION PRN
Status: DISCONTINUED | OUTPATIENT
Start: 2025-07-02 | End: 2025-07-11 | Stop reason: HOSPADM

## 2025-07-02 RX ORDER — BACLOFEN 10 MG/1
10 TABLET ORAL NIGHTLY
Status: DISCONTINUED | OUTPATIENT
Start: 2025-07-02 | End: 2025-07-11 | Stop reason: HOSPADM

## 2025-07-02 RX ADMIN — CLOPIDOGREL BISULFATE 75 MG: 75 TABLET, FILM COATED ORAL at 13:36

## 2025-07-02 RX ADMIN — BACLOFEN 10 MG: 10 TABLET ORAL at 20:38

## 2025-07-02 RX ADMIN — WATER 1000 MG: 1 INJECTION INTRAMUSCULAR; INTRAVENOUS; SUBCUTANEOUS at 22:55

## 2025-07-02 RX ADMIN — LISINOPRIL 5 MG: 5 TABLET ORAL at 09:43

## 2025-07-02 RX ADMIN — SERTRALINE 50 MG: 50 TABLET, FILM COATED ORAL at 09:43

## 2025-07-02 RX ADMIN — Medication 2000 UNITS: at 09:43

## 2025-07-02 RX ADMIN — ATORVASTATIN CALCIUM 80 MG: 80 TABLET, FILM COATED ORAL at 20:38

## 2025-07-02 RX ADMIN — SODIUM CHLORIDE, PRESERVATIVE FREE 10 ML: 5 INJECTION INTRAVENOUS at 20:38

## 2025-07-02 RX ADMIN — ASPIRIN 81 MG: 81 TABLET, CHEWABLE ORAL at 09:43

## 2025-07-02 RX ADMIN — LAMOTRIGINE 25 MG: 25 TABLET ORAL at 20:38

## 2025-07-02 RX ADMIN — SODIUM CHLORIDE, PRESERVATIVE FREE 10 ML: 5 INJECTION INTRAVENOUS at 09:43

## 2025-07-02 RX ADMIN — ENOXAPARIN SODIUM 40 MG: 100 INJECTION SUBCUTANEOUS at 09:43

## 2025-07-02 ASSESSMENT — PAIN SCALES - GENERAL: PAINLEVEL_OUTOF10: 0

## 2025-07-02 NOTE — PROGRESS NOTES
Facility/Department: [address] CARE  SLP Clinical Swallow Evaluation and Speech Language Cognitive Assessment     Patient: Darshana Grace   : 1945   MRN: [id number]      Evaluation Date: 2025      Admitting Dx:   TIA (transient ischemic attack) [G45.9]  Stroke-like symptoms [R29.90]   has a past medical history of Arthritis, Bell's palsy, Cerebral artery occlusion with cerebral infarction (HCC), CKD (chronic kidney disease), Claustrophobia, Diabetes mellitus (HCC), Esophageal reflux, blood clots, Hyperlipidemia, Hypertension, Kidney stone, Nervous, Pulmonary embolism (HCC), and Stuttering.   has a past surgical history that includes Hysterectomy; Appendectomy; Cardiac catheterization; Cholecystectomy; Colonoscopy; Tonsillectomy; knee surgery (Right); blepharoplasty (Bilateral, 2021); Abdomen surgery; and Endoscopy, colon, diagnostic.  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 per \"family\"); seen recently during prior acute admission s/p stroke for speech/lang/cog  Dysphagia History including instrumental assessment: rec for easy chew/thin s/p prior stroke  GI history: h/o esophageal reflux  Prior level of function (communication, home/med/finance management, driving, etc) and living status: admitted from home alone following psych admission from acute care s/p stroke - patient reports home alone did not go well; prior to stroke, was independent for ADLs and IADLs, but reduced level of function/independence s/p stroke; was an active  prior to stroke, but not medically cleared to drive post-stroke; 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    CURRENT ENCOUNTER DIAGNOSTICS/COURSE OF ADMISSION     2025 admitted

## 2025-07-02 NOTE — PROGRESS NOTES
V2.0    [facility] Progress Note      Name:  Darshana Grace /Age/Sex: 1945  (79 y.o. female)   MRN & CSN:  [id number] & [id number] Encounter Date/Time: 2025 8:48 AM EDT   Location:  [id number] PCP: Eugenia Lamb MD     Attending:Master Garcia DO       Hospital Day: 2  Brief HPI  Darshana Grace is a 79 y.o. female with pertinent PMHx of CVA, CKD, T2DM, HTN, HLD who presented with altered mental status and trouble speaking concerning for new acute CVA.  Assessment & Plan     Acute CVA  - CT head nonacute  - CTA head and neck with multiple areas of stenosis   - MRI brain pending  - Continue DAPT with aspirin 81 mg daily and Plavix 75 mg daily  - Continue atorvastatin 80 mg nightly  - Neurology following, discussed with Ivonne Maxwell,, patient would benefit from neuroendovascular evaluation  - PT and OT evaluation    Urinary tract infection  - Continue IV Rocephin    Essential hypertension  - Continue lisinopril     Depression  - Continue Lamictal and sertraline      Diet Diet NPO   DVT Prophylaxis [x] Lovenox, []  Heparin, [] SCDs, [] Ambulation,  [] Eliquis, [] Xarelto  [] Coumadin   Code Status Full Code   Disposition From: Home  Expected Disposition: SNF  Estimated Date of Discharge:            Subjective:     Chief Complaint: Altered mental status    Patient was seen and examined today sitting up in chair in room  Denies any worsening focal weakness  Does state that she has more issues with her speech      Review of Systems:      Pertinent positives and negatives discussed in HPI    Objective:   No intake or output data in the 24 hours ending 25 0848   Vitals:   Vitals:    25 0129 25 0322 25 0453 25 0829   BP: (!) 177/72 (!) 157/72  (!) 158/70   Pulse: 65   64   Resp: 18 18  18   Temp: 98 °F (36.7 °C) 97.7 °F (36.5 °C)  96.9 °F (36.1 °C)   TempSrc: Oral Axillary  Axillary   SpO2: 100% 99%  100%   Weight:   64.8 kg (142 lb 13.7 oz)    Height:      [FreeTextEntry1] : CPE [de-identified] : Pt comes in for CPE.  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  Iron deficiency: not vegan/vegetarian.  Onychomycosis: never received terbinafine from pharmacy, still has issues with 1st toe nail both sides.

## 2025-07-02 NOTE — CARE COORDINATION
The Plan for Transition of Care is related to the following treatment goals:     SNF placement 3-5 times per week.     The Patient and/or patient representative was provided with a choice of provider and agrees   with the discharge plan. [x] Yes [] No    Freedom of choice list was provided with basic dialogue that supports the patient's individualized plan of care/goals, treatment preferences and shares the quality data associated with the providers. [x] Yes [] No    PARESH spoke to RAMANDEEP Mary and she is comfortable with a rehab referral she's just not sure where to start. PARESH emailed her a medical mutual list to her attention at [email or web address]    Respectfully submitted,    FARHAN Bello  [facility] [place]   [phone]    Electronically signed by FARIDEH Cuevas, HERNANDEZ on 7/2/2025 at 4:40 PM

## 2025-07-02 NOTE — H&P
tablet by mouth at bedtime for 89 doses 6/17/25 9/14/25  Master Garcia DO   aspirin 81 MG chewable tablet Take 1 tablet by mouth daily 6/14/25   Phuong Bains MD   lamoTRIgine (LAMICTAL) 25 MG tablet Take 1 tablet by mouth at bedtime 6/13/25   Phuong Bains MD   atorvastatin (LIPITOR) 80 MG tablet Take 1 tablet by mouth nightly 6/13/25   Phuong Bains MD   lisinopril (PRINIVIL;ZESTRIL) 5 MG tablet Take 1 tablet by mouth daily 6/14/25   Phuong Bains MD   sertraline (ZOLOFT) 50 MG tablet Take 1 tablet by mouth daily 6/14/25   Phuong Bains MD   omeprazole (PRILOSEC) 40 MG delayed release capsule Take 1 capsule by mouth daily  Patient not taking: Reported on 6/9/2025    Ronald Landaverde MD   baclofen (LIORESAL) 10 MG tablet Take 1 tablet by mouth at bedtime    Ronald Landaverde MD   Cholecalciferol (VITAMIN D) 50 MCG (2000 UT) CAPS capsule Take by mouth  Patient not taking: Reported on 6/9/2025    Ronald Landaverde MD        Allergies   Fioricet [butalbital-apap-caffeine], Fluoxetine, Lyrica [pregabalin], Valacyclovir, Wasp venom, Chocolate, and Neosporin original [bacitracin-neomycin-polymyxin]    Social History     Social History       Tobacco History       Smoking Status  Former      Smokeless Tobacco Use  Never      Tobacco Comments  10 yrs ago quit              Alcohol History       Alcohol Use Status  Yes              Drug Use       Drug Use Status  Never              Sexual Activity       Sexually Active  Not Asked                    Family History     Family History   Problem Relation Age of Onset    Lung Cancer Mother     High Blood Pressure Mother     High Cholesterol Mother     Kidney Cancer Father     Diabetes Father     High Blood Pressure Father     High Cholesterol Father        Review of Systems   Review of Systems    Physical Exam   BP (!) 157/72   Pulse 65   Temp 97.7 °F (36.5 °C) (Axillary)   Resp 18   Ht 1.702 m (5' 7\")   Wt 64.6 kg (142 lb 6.7 oz)   SpO2 99%   BMI 22.31  Collection Time: 07/02/25  1:35 AM   Result Value Ref Range    Troponin, High Sensitivity 17 (H) 0 - 14 ng/L        Imaging/Diagnostics Last 24 Hours   CT HEAD WO CONTRAST  Addendum Date: 7/1/2025  ADDENDUM: Results were relayed to Bryan PEDRAZA at 9:07 p.m. on 07/01/2025 by the radiology results communication center.     Result Date: 7/1/2025  EXAMINATION: CT OF THE HEAD WITHOUT CONTRAST  7/1/2025 7:43 pm TECHNIQUE: CT of the head was performed without the administration of intravenous contrast. Automated exposure control, iterative reconstruction, and/or weight based adjustment of the mA/kV was utilized to reduce the radiation dose to as low as reasonably achievable. COMPARISON: CT brain 06/14/2025, brain MRI 06/16/2025 HISTORY: ORDERING SYSTEM PROVIDED HISTORY: Dizziness TECHNOLOGIST PROVIDED HISTORY: If patient is on cardiac monitor and/or pulse ox, they may be taken off cardiac monitor and pulse ox, left on O2 if currently on. All monitors reattached when patient returns to room. Has a \"code stroke\" or \"stroke alert\" been called?->No Reason for exam:->Dizziness FINDINGS: BRAIN/VENTRICLES: Evolving left PCA territory infarct demonstrates early volume loss with resolution of sulcal effacement in the left occipital lobe. No appreciable new, acute large territory infarct.  Remote lacunar infarct in the right thalamus.  There is no acute intracranial hemorrhage, mass effect or midline shift.  No abnormal extra-axial fluid collection.  There is mild diffuse parenchymal atrophy.  Patchy bilateral periventricular and subcortical white matter hypodensities are nonspecific, but compatible with chronic microvascular ischemic change. ORBITS: The visualized portion of the orbits demonstrate no acute abnormality. SINUSES: The visualized paranasal sinuses and mastoid air cells demonstrate no acute abnormality. SOFT TISSUES/SKULL:  No acute abnormality of the visualized skull or soft tissues.     1. No acute intracranial

## 2025-07-02 NOTE — PROGRESS NOTES
4 Eyes Skin Assessment     NAME:  Darshana Grace  YOB: 1945  MEDICAL RECORD NUMBER:  [id number]    The patient is being assessed for  Admission    I agree that at least one RN has performed a thorough Head to Toe Skin Assessment on the patient. ALL assessment sites listed below have been assessed.      Areas assessed by both nurses:    Head, Face, Ears, Shoulders, Back, Chest, Arms, Elbows, Hands, Sacrum. Buttock, Coccyx, Ischium, and Legs. Feet and Heels        Does the Patient have a Wound? No noted wound(s)       Alhaji Prevention initiated by RN: Yes  Wound Care Orders initiated by RN: No    Pressure Injury (Stage 1,2,3,4, Unstageable, DTI, NWPT, and Complex wounds) if present, place Wound referral order by RN under : No    New Ostomies, if present place, Ostomy referral order under : No     Nurse 1 eSignature: Electronically signed by Chioma Nagel RN on 7/2/25 at 3:43 AM EDT    **SHARE this note so that the co-signing nurse can place an eSignature**    Nurse 2 eSignature: Electronically signed by Pati Marsh RN on 7/2/25 at 3:52 AM EDT

## 2025-07-02 NOTE — PROGRESS NOTES
Physical Therapy    [facility] - Physical Therapy   Phone: [phone]    Physical Therapy  Facility/Department:[address] PROGRESSIVE CARE    [x] Initial Evaluation            [x] Daily Treatment Note         [] Discharge Summary      Patient: Darshana Grace   : 1945   MRN: [id number]   Date of Service:  2025  Staff Mobility Recommendation: RW with assist of 1 and gait belt    AM-PAC score: 17/24  Discharge Recommendations: SNF   Darshana Grace scored a 17/ on the AM-PAC short mobility form. Current research shows that an AM-PAC score of 17 or less is typically not associated with a discharge to the patient's home setting. Based on the patient's AM-PAC score and their current functional mobility deficits, it is recommended that the patient have 3-5 sessions per week of Physical Therapy at d/c to increase the patient's independence.  Please see assessment section for further patient specific details.    If patient discharges prior to next session this note will serve as a discharge summary.  Please see below for the latest assessment towards goals.      Admitting Diagnosis: Stroke-like symptoms  Ordering Physician: Dr Borden  Current Admission Summary: 79f recently admitted with CVA with resultant R facial, upper/lower ext weakness and dysarthria, presents to the ER due to lethargy and \"not speaking\" according to family. Patient is seen on room air, attempting to respond to questions however with dysarthria.     Past Medical History:  has a past medical history of Arthritis, Bell's palsy, Cerebral artery occlusion with cerebral infarction (HCC), CKD (chronic kidney disease), Claustrophobia, Diabetes mellitus (HCC), Esophageal reflux, Hx of blood clots, Hyperlipidemia, Hypertension, Kidney stone, Nervous, Pulmonary embolism (HCC), and Stuttering.  Past Surgical History:  has a past surgical history that includes Hysterectomy; Appendectomy; Cardiac catheterization; Cholecystectomy;  Colonoscopy; Tonsillectomy; knee surgery (Right); blepharoplasty (Bilateral, 03/01/2021); Abdomen surgery; and Endoscopy, colon, diagnostic.    Assessment  Activity Tolerance: Fair  Impairments Requiring Therapeutic Intervention: decreased functional mobility, decreased safety awareness, decreased cognition, decreased endurance, decreased balance  Prognosis: fair    Clinical Assessment: pt was recently in hosp for CVA; after a IP psych adm pt was discharged home.  Prior to recent CVA, pt was living alone and driving.  Pt currently is unsteady and needing assist for bed mob, transfers and short distance abm with RW.  Pt is not safe to return home alone, recommend continued therapy in SNF with possible LTC or AL as pt does not have family support      DME Required For Discharge: DME to be determined at next level of care        Restrictions:  Precautions/Restrictions: high fall risk  Weight Bearing Restrictions: no restrictions    Required Braces/Orthotics: no braces required  Positional Restrictions:no positional restrictions    Subjective  General: pt is pleasant and agreeable to working with therapy; pt has dysarthria but able to answer simple questions  Family/Caregiver present: No  Pain: 0/10  Pain Interventions: not applicable    Home Environment / Functional History  Lives With: Alone  Type of Home: Trailer  Home Layout: One level  Home Access: Ramped entrance  Bathroom Shower/Tub: Walk-in shower  Bathroom Toilet: Standard  Bathroom Equipment: Grab bars in shower, Shower chair  Home Equipment: Cane, Walker - Rolling, Wheelchair - Manual  Has the patient had two or more falls in the past year or any fall with injury in the past year?: Yes  Prior Level of Assist for ADLs: Independent  Prior Level of Assist for Homemaking: Independent  Prior Level of Assist for Ambulation: Independent household ambulator, with or without device (with walker)  Prior Level of Assist for Transfers: Independent  Active :

## 2025-07-02 NOTE — CARE COORDINATION
07/02/25 1651   Readmission Assessment   Number of Days since last admission? 8-30 days   Previous Disposition Other (comment)  (Inpatient psych)   Who is being Interviewed Caregiver   What was the patient's/caregiver's perception as to why they think they needed to return back to the hospital? Other (Comment)  (lost her ability to speak.)   Did you visit your Primary Care Physician after you left the hospital, before you returned this time? No   Why weren't you able to visit your PCP? Did not have an appointment   Did you see a specialist, such as Cardiac, Pulmonary, Orthopedic Physician, etc. after you left the hospital? No   Who advised the patient to return to the hospital? Caregiver   Does the patient report anything that got in the way of taking their medications? No   In our efforts to provide the best possible care to you and others like you, can you think of anything that we could have done to help you after you left the hospital the first time, so that you might not have needed to return so soon? Other (Comment)  (should have gone to a nursing home after last discharge from psych.)       Respectfully submitted,    FARHAN Bello [place]   [phone]    Electronically signed by FARIDEH Cuevas, HERNANDEZ on 7/2/2025 at 4:52 PM

## 2025-07-02 NOTE — CONSULTS
Neurology Consult Note  Reason for Consult: suspected CVA    Chief complaint: limited by expressive aphasia     Master Amos I, DO asked me to see Darshana Grace in consultation for evaluation of suspected CVA    History of Present Illness:  I obtained my information via chart review, medical team.     Darshana Grace is a 79 y.o. female with hx of DM, HTN, HLD, PE, bells palsy; recent left PCA thromboembolic infarct 2/2 to left PCA occlusion (6/2025). She had clinical symptoms of aphasia, right VF deficit, right facial weakness per last neurology exam. DAPT x 90 days was recommended.     She presented for current admission on 7/1/25 from home. Family called 911 as patient was found to have inability to speak and more lethargic then typical. Was not able to get out of bed. Her last known normal was 1200 on 7/1/25. She was initially stroke alerted in the ED, and case was discussed with  stroke team.     She is familiar to me from prior admission. At time of encounter she is sitting in the chair with clear dysconjugate gaze. RN immediately notified and reports that her gaze palsy had been present at sign out and was reported to be present overnight by night shift RN.   Per RN no acutely new neurological/symptoms or concerns for her shift today. ED encounter reviewed. No clear gaze palsy documented. Case was discussed with primary team attending.     No visitors present. Subjective input limited by aphasia. She does seem to endorse double vision. No clear headache but again limited assessment.      Blood pressure reviewed: -170's largely.         Medical History:  Past Medical History:   Diagnosis Date    Arthritis     Bell's palsy     Cerebral artery occlusion with cerebral infarction (HCC)     CKD (chronic kidney disease)     Claustrophobia     Diabetes mellitus (HCC)     Esophageal reflux     Hx of blood clots     X 14    Hyperlipidemia     Hypertension     Kidney stone     Nervous

## 2025-07-02 NOTE — ED PROVIDER NOTES
This is my MARY Supervisory and shared visit note:     This patient was seen by the advanced practice provider.     I personally saw the patient and made/approved the management plan and take responsibility for the patient management.      Briefly, 79 y.o. female presents with altered mental status.  Patient has a past medical history of multiple strokes with right-sided deficits and slurred speech.  Patient's last well-known was at 12 PM today.  Family stated that patient had more lethargy and was completely nonverbal.  They stated patient is normally able to ambulate and speak.  Glucose en route was 85.  Patient does have a history of recent UTI.    Focused exam:   Gen: awake, alert, and NAD  HEENT: NCAT.  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  CV: RRR w/o MRG  Lungs: CTAB. No incr WOB.   Abdomen: Soft, nontender, nondistended. No rebound/guarding.   Neuro: Patient able to lift both extremities off bed.  Patient has dysarthria.  Right-sided facial droop involving lower part of mouth.  Patient able to lift both lower extremities off bed for 5 seconds.  Finger-nose normal.  Heel-to-shin normal.        MDM:   Patient is hemodynamic stable upon arrival to the emergency department.  Patient is afebrile.  Differential diagnose includes but not limited to TIA, stroke, UTI, electrolyte abnormality, intracranial bleed, anemia.  CMP negative for gross derangements, patient did have elevated AST of 44.  CBC shows a leukocytosis of 12.0.  Troponin elevated at 18.  Point-of-care glucose on arrival was 71, PT/INR within normal limits.  Urinalysis shows UTI.  Patient empirically started on ceftriaxone.  CT of head negative for intracranial bleed.CTA shows occlusion and proximal P1 segment of left PCA, and moderate stenosis in proximal P2 segment of right PCA, moderate stenosis of A4 segment in left DANY, severe stenosis at the origin of anterior M2 segment in the left MCA,  no acute abnormality or flow-limiting stenosis of the major arteries in the neck.  Incidental finding of 1 cm soft tissue prominence in the left posterior tongue base likely related to inflammatory changes.  We did consult stroke who states that patient can be admitted here and that patient stenosis and occlusion noted on CT angio of the head for chronic in nature.  On my initial evaluation of patient her NIH stroke scale is 3 for facial drooping and dysarthria which could be consistent with patient's baseline.  Patient will need MRI for further stroke workup when she is admitted and further treatment for urinary tract infection.    I independently interpreted the following studies: N/A      CRITICAL CARE  I personally saw the patient and independently provided 10 minutes of non-concurrent critical care out of the total shared critical care time provided. This excludes seperately billable procedures. Critical care time was provided for strokelike symptoms requiring neurostroke consult that required close evaluation and/or intervention with concern for potential patient decompensation.      For further details of the patient's emergency department visit, please see the advanced practice provider's documentation.    Thor Valente MD     This report has been produced using speech recognition software and may contain errors related to that system including errors in grammar, punctuation, and spelling, as well as words and phrases that may be inappropriate. If there are any questions or concerns please feel free to contact the dictating provider for clarification.     Thor Valente MD  07/02/25 1070

## 2025-07-02 NOTE — ED NOTES
ED TO INPATIENT SBAR HANDOFF    Patient Name: Darshana Grace   Preferred Name: Darshana  : 1945  79 y.o.   Family/Caregiver Present: no   Code Status Order: Prior  PO Status: NPO:Yes  Telemetry Order: Yes  C-SSRS:    Sitter no   Restraints:     Sepsis Risk Score      Situation  Chief Complaint   Patient presents with    Altered Mental Status     Ems report: pt. Coming from home, history of multiple strokes with R sided deficit and slurred speech, LKW 1200 today, pt. Family called for lethargy and not speaking. Pt. Is normally able to ambulate and speak. VSS en route. D-stick 85, history of a recent UTI     Brief Description of Patient's Condition: pt. Has history of multiple strokes with chronic deficits. Family believes she is almost back to her known normal.   Mental Status: alert  Arrived from:Home  Imaging:   CTA HEAD NECK W CONTRAST   Final Result   Occlusion in the proximal P1 segment of the left PCA.      Moderate stenosis in proximal P2 segment of the right PCA.      Moderate stenosis in the A4 segment of the left DANY.      Severe stenosis at the origin of an anterior M2 segment of the left MCA.      No acute abnormality or flow-limiting stenosis of the major arteries of the   neck.      1 cm soft tissue prominence of the left posterior tongue base, likely related   to inflammatory changes.  Recommend follow-up with direct visualization to   exclude underlying lesion.         CT HEAD WO CONTRAST   Final Result   Addendum (preliminary)    ADDENDUM:   Results were relayed to Bryan PEDRAZA at 9:07 p.m. on 2025 by    the   radiology results communication center.         Final   1. No acute intracranial abnormality.      2.  Expected evolution of previous left PCA territory infarct.      The findings were sent to the Radiology Results Communication Center at 8:54   pm on 2025 to be communicated to a licensed caregiver.           Abnormal labs:   Abnormal Labs Reviewed   URINALYSIS  call Sending RN at [phone]      Admitting Unit Notification  Name of person notified and time: Qi      Electronically signed by: Electronically signed by Alejandro Miller RN on 7/2/2025 at 12:41 AM

## 2025-07-02 NOTE — PROGRESS NOTES
Occupational Therapy        [facility] - Occupational Therapy   Phone: [phone]    Occupational Therapy  Facility/Department:[address] PROGRESSIVE CARE    [x] Initial Evaluation            [] Daily Treatment Note         [] Discharge Summary      Patient: Darshana Grace   : 1945   MRN: [id number]   Date of Service:  2025    Staff Mobility Recommendation: walker x 1 with gait belt    AM-PAC Score: 15/24  Discharge Recommendations: SNF  Darshana Grace scored a 15/24 on the AM-PAC ADL Inpatient form. Current research shows that an AM-PAC score of 17 or less is typically not associated with a discharge to the patient's home setting. Based on the patient's AM-PAC score and their current ADL deficits, it is recommended that the patient have 3-5 sessions per week of Occupational Therapy at d/c to increase the patient's independence.  Please see assessment section for further patient specific details.    If patient discharges prior to next session this note will serve as a discharge summary.  Please see below for the latest assessment towards goals.      Admitting Diagnosis:  Stroke-like symptoms  Ordering Physician: Dr. Borden  Current Admission Summary: Per H&P: \"79f recently admitted with CVA with resultant R facial, upper/lower ext weakness and dysarthria, presents to the ER due to lethargy and \"not speaking\" according to family. \" CT head showed: 1. No acute intracranial abnormality.   2.  Expected evolution of previous left PCA territory infarct. MRI pending       Past Medical History:  has a past medical history of Arthritis, Bell's palsy, Cerebral artery occlusion with cerebral infarction (HCC), CKD (chronic kidney disease), Claustrophobia, Diabetes mellitus (HCC), Esophageal reflux, Hx of blood clots, Hyperlipidemia, Hypertension, Kidney stone, Nervous, Pulmonary embolism (HCC), and Stuttering.  Past Surgical History:  has a past surgical history that includes Hysterectomy;  medications. Pt DC to IP psyc 6/18- 6/24 and returned home.  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- reports seeig occas shadows)  Vision Exceptions: Wears glasses for reading  Hearing:   Hearing: Within functional limits       Observation:   General Observation:  pleasant; tends to keep L eye closed, however when open observes deviating laterally; slight facial droop (chronic)  Posture:   Fair  Sensation:   Not Tested  Coordination Testing:   Not Tested    ROM:   (B) UE AROM WFL  Strength:   (B) UE strength grossly WFL    Therapist Clinical Decision Making (Complexity): medium complexity         Activities of Daily Living  Basic Activities of Daily Living  Grooming: minimal assistance ; standing in front of sink to brush teeth  Upper Extremity Dressing: assist to change soiled gown  Lower Extremity Dressing: maximum assistance ; assist to change soiled adult brief  Toileting: maximum assistance ; pt had BM on toilet, assist to cleanse pt and nancy clean adult brief.    General Comments: Pt limited by dec balance and vision deficits  Instrumental Activities of Daily Living  No IADL completed on this date.    Functional Mobility  Bed Mobility:  Supine to Sit: contact guard assistance, use of bed rail, head of bed elevated  Sit to Supine: n/a, in recliner at end of session  Transfers:  Sit to stand transfer:minimal assistance  Stand to sit transfer: minimal assistance  Toilet transfer: minimal assistance  To/from walker- cuing for hand placement  Functional Mobility  Functional Mobility Activity: to/from bathroom  Device Use: rolling walker  Required Assistance: minimal assistance  Comment: intermittent assist to steer walker d/t vision deficits  Balance:  Static Sitting Balance: fair (-): maintains balance at CGA with use of UE support  Dynamic Sitting Balance: fair (-): maintains

## 2025-07-02 NOTE — PLAN OF CARE
Problem: Chronic Conditions and Co-morbidities  Goal: Patient's chronic conditions and co-morbidity symptoms are monitored and maintained or improved  Outcome: Progressing     Problem: Discharge Planning  Goal: Discharge to home or other facility with appropriate resources  Outcome: Progressing     Problem: Pain  Goal: Verbalizes/displays adequate comfort level or baseline comfort level  Outcome: Progressing     Problem: Skin/Tissue Integrity  Goal: Skin integrity remains intact  Description: 1.  Monitor for areas of redness and/or skin breakdown  2.  Assess vascular access sites hourly  3.  Every 4-6 hours minimum:  Change oxygen saturation probe site  4.  Every 4-6 hours:  If on nasal continuous positive airway pressure, respiratory therapy assess nares and determine need for appliance change or resting period  Outcome: Progressing  Flowsheets (Taken 7/2/2025 0130)  Skin Integrity Remains Intact: Monitor for areas of redness and/or skin breakdown     Problem: ABCDS Injury Assessment  Goal: Absence of physical injury  Outcome: Progressing     Problem: Safety - Adult  Goal: Free from fall injury  Outcome: Progressing

## 2025-07-02 NOTE — CARE COORDINATION
Case Management Assessment  Initial Evaluation    Date/Time of Evaluation: 7/2/2025 4:50 PM  Assessment Completed by: FARIDEH Cuevas, DAMIONW    If patient is discharged prior to next notation, then this note serves as note for discharge by case management.    Patient Name: Darshana Grace                   YOB: 1945  Diagnosis: TIA (transient ischemic attack) [G45.9]  Stroke-like symptoms [R29.90]                   Date / Time: 7/1/2025  7:56 PM    Patient Admission Status: Inpatient   Readmission Risk (Low < 19, Mod (19-27), High > 27): Readmission Risk Score: 17.7    Current PCP: Eugenia Lamb MD  PCP verified by CM? Yes    Chart Reviewed: Yes      History Provided by: Friend  Patient Orientation: Person, Place, Self    Patient Cognition: Other (see comment) (pleasantly confused.)    Hospitalization in the last 30 days (Readmission):  Yes    If yes, Readmission Assessment in  Navigator will be completed.    Advance Directives:      Code Status: DNR-CCA   Patient's Primary Decision Maker is: Named in Scanned ACP Document    Primary Decision Maker: Annabelle Payne - Friend - [phone]    Secondary Decision Maker: Maddy Fallon - Friend - [phone]    Discharge Planning:    Patient lives with: Alone Type of Home: Trailer/Mobile Home  Primary Care Giver: Self  Patient Support Systems include: Friends/Neighbors, Family Members   Current Financial resources: Medicare  Current community resources: None  Current services prior to admission: Durable Medical Equipment            Current DME: Walker, Wheelchair, Cane            Type of Home Care services:  None    ADLS  Prior functional level: Independent in ADLs/IADLs  Current functional level: Assistance with the following:, Cooking, Housework, Shopping, Mobility    PT AM-PAC: 17 /24  OT AM-PAC: 15 /24    Family can provide assistance at DC: No  Would you like Case Management to discuss the discharge plan with any other family

## 2025-07-02 NOTE — ED PROVIDER NOTES
[facility] EMERGENCY DEPARTMENT  EMERGENCY DEPARTMENT ENCOUNTER      Pt Name: Darshana Grace  MRN:[id number]  Birthdate 1945  Date of evaluation: 7/1/2025  Provider: Bryan Hi PA-C  Note Started: 8:51 PM EDT 7/1/25    This patient was seen evaluated by attending physician Dr. Thor Valentino MD        Chief Complaint:    Chief Complaint   Patient presents with    Altered Mental Status     Ems report: pt. Coming from home, history of multiple strokes with R sided deficit and slurred speech, LKW 1200 today, pt. Family called for lethargy and not speaking. Pt. Is normally able to ambulate and speak. VSS en route. D-stick 85, history of a recent UTI         Nursing Notes, Past Medical Hx, Past Surgical Hx, Social Hx, Allergies, and Family Hx were all reviewed and agreed with or any disagreements were addressed in the HPI.    HPI: (Location, Duration, Timing, Severity, Quality, Assoc Sx, Context, Modifying factors)    History From: Patient's daughter  Limitations to history : None    Social Determinants Significantly Affecting Health : None    Chief Complaint of altered mental status change.  Patient was brought in by EMS coming from home.  Patient has history of multiple strokes.  Daughter states that the stroke affects her right side more weakness.  Also states that she was last known normal at 12:00 PM.  And when she went to bring her dinner she noticed that she was more lethargic and could not make out words and could not get out of bed.  Patient normally walks with her walker very slow but she is able to walk.  This time she is not able to.  And she is not able to make out any words she just moves her mouth.  The daughter said her speech was slurred but she can at least hear words but now is slight she just mumbles cannot get anything out.  Patient denies chest pain by nodding her head she denies abdominal pain.  There is no nausea vomiting.    This is a  79 y.o. female who

## 2025-07-03 ENCOUNTER — APPOINTMENT (OUTPATIENT)
Dept: MRI IMAGING | Age: 80
End: 2025-07-03
Payer: MEDICARE

## 2025-07-03 ENCOUNTER — APPOINTMENT (OUTPATIENT)
Dept: CT IMAGING | Age: 80
End: 2025-07-03
Payer: MEDICARE

## 2025-07-03 LAB
ANION GAP SERPL CALCULATED.3IONS-SCNC: 12 MMOL/L (ref 3–16)
BUN SERPL-MCNC: 30 MG/DL (ref 7–20)
CALCIUM SERPL-MCNC: 9.3 MG/DL (ref 8.3–10.6)
CHLORIDE SERPL-SCNC: 101 MMOL/L (ref 99–110)
CHOLEST SERPL-MCNC: 95 MG/DL (ref 0–199)
CO2 SERPL-SCNC: 27 MMOL/L (ref 21–32)
CREAT SERPL-MCNC: 0.9 MG/DL (ref 0.6–1.2)
DEPRECATED RDW RBC AUTO: 13.1 % (ref 12.4–15.4)
EKG ATRIAL RATE: 64 BPM
EKG DIAGNOSIS: NORMAL
EKG P AXIS: 51 DEGREES
EKG P-R INTERVAL: 174 MS
EKG Q-T INTERVAL: 458 MS
EKG QRS DURATION: 146 MS
EKG QTC CALCULATION (BAZETT): 472 MS
EKG R AXIS: -59 DEGREES
EKG T AXIS: 2 DEGREES
EKG VENTRICULAR RATE: 64 BPM
EST. AVERAGE GLUCOSE BLD GHB EST-MCNC: 108.3 MG/DL
GFR SERPLBLD CREATININE-BSD FMLA CKD-EPI: 65 ML/MIN/{1.73_M2}
GLUCOSE SERPL-MCNC: 115 MG/DL (ref 70–99)
HBA1C MFR BLD: 5.4 %
HCT VFR BLD AUTO: 42.2 % (ref 36–48)
HDLC SERPL-MCNC: 47 MG/DL (ref 40–60)
HGB BLD-MCNC: 14.5 G/DL (ref 12–16)
LDLC SERPL CALC-MCNC: 35 MG/DL
MAGNESIUM SERPL-MCNC: 2.09 MG/DL (ref 1.8–2.4)
MCH RBC QN AUTO: 31.2 PG (ref 26–34)
MCHC RBC AUTO-ENTMCNC: 34.4 G/DL (ref 31–36)
MCV RBC AUTO: 90.7 FL (ref 80–100)
PLATELET # BLD AUTO: 185 K/UL (ref 135–450)
PMV BLD AUTO: 9.6 FL (ref 5–10.5)
POTASSIUM SERPL-SCNC: 3.2 MMOL/L (ref 3.5–5.1)
RBC # BLD AUTO: 4.65 M/UL (ref 4–5.2)
SODIUM SERPL-SCNC: 140 MMOL/L (ref 136–145)
TRIGL SERPL-MCNC: 66 MG/DL (ref 0–150)
TROPONIN, HIGH SENSITIVITY: 18 NG/L (ref 0–14)
TROPONIN, HIGH SENSITIVITY: 20 NG/L (ref 0–14)
VLDLC SERPL CALC-MCNC: 13 MG/DL
WBC # BLD AUTO: 10.4 K/UL (ref 4–11)

## 2025-07-03 PROCEDURE — 6370000000 HC RX 637 (ALT 250 FOR IP): Performed by: HOSPITALIST

## 2025-07-03 PROCEDURE — 6360000004 HC RX CONTRAST MEDICATION: Performed by: NURSE PRACTITIONER

## 2025-07-03 PROCEDURE — 94760 N-INVAS EAR/PLS OXIMETRY 1: CPT

## 2025-07-03 PROCEDURE — 2500000003 HC RX 250 WO HCPCS: Performed by: HOSPITALIST

## 2025-07-03 PROCEDURE — 70498 CT ANGIOGRAPHY NECK: CPT

## 2025-07-03 PROCEDURE — 70450 CT HEAD/BRAIN W/O DYE: CPT

## 2025-07-03 PROCEDURE — 36415 COLL VENOUS BLD VENIPUNCTURE: CPT

## 2025-07-03 PROCEDURE — 2100000000 HC CCU R&B

## 2025-07-03 PROCEDURE — 84484 ASSAY OF TROPONIN QUANT: CPT

## 2025-07-03 PROCEDURE — 85027 COMPLETE CBC AUTOMATED: CPT

## 2025-07-03 PROCEDURE — 83735 ASSAY OF MAGNESIUM: CPT

## 2025-07-03 PROCEDURE — 70551 MRI BRAIN STEM W/O DYE: CPT

## 2025-07-03 PROCEDURE — 83036 HEMOGLOBIN GLYCOSYLATED A1C: CPT

## 2025-07-03 PROCEDURE — 6360000002 HC RX W HCPCS: Performed by: HOSPITALIST

## 2025-07-03 PROCEDURE — 80048 BASIC METABOLIC PNL TOTAL CA: CPT

## 2025-07-03 PROCEDURE — 80061 LIPID PANEL: CPT

## 2025-07-03 PROCEDURE — 93010 ELECTROCARDIOGRAM REPORT: CPT | Performed by: INTERNAL MEDICINE

## 2025-07-03 RX ORDER — IOPAMIDOL 755 MG/ML
75 INJECTION, SOLUTION INTRAVASCULAR
Status: COMPLETED | OUTPATIENT
Start: 2025-07-03 | End: 2025-07-03

## 2025-07-03 RX ADMIN — SODIUM CHLORIDE, PRESERVATIVE FREE 10 ML: 5 INJECTION INTRAVENOUS at 21:10

## 2025-07-03 RX ADMIN — ATORVASTATIN CALCIUM 80 MG: 80 TABLET, FILM COATED ORAL at 21:10

## 2025-07-03 RX ADMIN — Medication 2000 UNITS: at 08:49

## 2025-07-03 RX ADMIN — PANTOPRAZOLE SODIUM 40 MG: 40 TABLET, DELAYED RELEASE ORAL at 06:02

## 2025-07-03 RX ADMIN — LISINOPRIL 5 MG: 5 TABLET ORAL at 08:49

## 2025-07-03 RX ADMIN — WATER 1000 MG: 1 INJECTION INTRAMUSCULAR; INTRAVENOUS; SUBCUTANEOUS at 23:13

## 2025-07-03 RX ADMIN — SODIUM CHLORIDE, PRESERVATIVE FREE 10 ML: 5 INJECTION INTRAVENOUS at 08:50

## 2025-07-03 RX ADMIN — CLOPIDOGREL BISULFATE 75 MG: 75 TABLET, FILM COATED ORAL at 08:50

## 2025-07-03 RX ADMIN — SERTRALINE 50 MG: 50 TABLET, FILM COATED ORAL at 08:49

## 2025-07-03 RX ADMIN — BACLOFEN 10 MG: 10 TABLET ORAL at 21:10

## 2025-07-03 RX ADMIN — IOPAMIDOL 75 ML: 755 INJECTION, SOLUTION INTRAVENOUS at 17:14

## 2025-07-03 RX ADMIN — LAMOTRIGINE 25 MG: 25 TABLET ORAL at 21:10

## 2025-07-03 RX ADMIN — ASPIRIN 81 MG: 81 TABLET, CHEWABLE ORAL at 08:49

## 2025-07-03 RX ADMIN — ENOXAPARIN SODIUM 40 MG: 100 INJECTION SUBCUTANEOUS at 08:49

## 2025-07-03 ASSESSMENT — PAIN SCALES - GENERAL
PAINLEVEL_OUTOF10: 0

## 2025-07-03 NOTE — PLAN OF CARE
Problem: Chronic Conditions and Co-morbidities  Goal: Patient's chronic conditions and co-morbidity symptoms are monitored and maintained or improved  7/3/2025 1039 by Paty Glaser RN  Outcome: Progressing  Flowsheets (Taken 7/3/2025 0849)  Care Plan - Patient's Chronic Conditions and Co-Morbidity Symptoms are Monitored and Maintained or Improved: Monitor and assess patient's chronic conditions and comorbid symptoms for stability, deterioration, or improvement     Problem: Discharge Planning  Goal: Discharge to home or other facility with appropriate resources  7/3/2025 1039 by Paty Glaser RN  Outcome: Progressing  Flowsheets (Taken 7/3/2025 0849)  Discharge to home or other facility with appropriate resources: Identify barriers to discharge with patient and caregiver     Problem: Pain  Goal: Verbalizes/displays adequate comfort level or baseline comfort level  7/3/2025 1039 by Paty Glaser RN  Outcome: Progressing  Flowsheets (Taken 7/3/2025 0810)  Verbalizes/displays adequate comfort level or baseline comfort level: Encourage patient to monitor pain and request assistance     Problem: Skin/Tissue Integrity  Goal: Skin integrity remains intact  Description: 1.  Monitor for areas of redness and/or skin breakdown  2.  Assess vascular access sites hourly  3.  Every 4-6 hours minimum:  Change oxygen saturation probe site  4.  Every 4-6 hours:  If on nasal continuous positive airway pressure, respiratory therapy assess nares and determine need for appliance change or resting period  7/3/2025 1039 by Paty Glaser RN  Outcome: Progressing  Flowsheets  Taken 7/3/2025 1038  Skin Integrity Remains Intact: Monitor for areas of redness and/or skin breakdown  Taken 7/3/2025 0849  Skin Integrity Remains Intact: Monitor for areas of redness and/or skin breakdown     Problem: ABCDS Injury Assessment  Goal: Absence of physical injury  7/3/2025 1039 by Paty Glaser RN  Outcome:

## 2025-07-03 NOTE — PROGRESS NOTES
SLP NOTE    1:06 PM:  Treatment attempted. Patient met at bedside with eyes shut. Multiple attempts to arouse patient, but patient not responsive to SLP attempts. ST to re-attempt as schedule permits unless otherwise notified.    2:21 PM:  Treatment re-attempted. Patient continues with eyes shut at bedside. Patient remains non-responsive to SLP attempts for treatment at this time. ST to re-attempt as schedule permits at a later date pending status unless otherwise notified.    Thank you.  Eusebia Kennedy MA, CCC-SLP, #[id number]  Speech-Language Pathologist  Portable phone: [phone]

## 2025-07-03 NOTE — CARE COORDINATION
Spoke with Annabelle TRIPATHI, and she wanted referrals sent to [facility].    Referrals sent.     Electronically signed by Jasmyne Jaimes RN on 7/3/2025 at 9:37 AM    Spoke with [facility] and they are unable to accept this patient due to insurance.     Electronically signed by Jasmyne Jaimes RN on 7/3/2025 at 10:36 AM    Spoke with Annabelle Hoyos about more choices for facilities.     SNF list was re emailed to her. Waiting on her reply.     Electronically signed by Jasmyne Jaimes RN on 7/3/2025 at 11:53 AM    [facility] cannot accept this patient as they are out of network. Still waiting for more choices from Annabelle Hoyos.    Electronically signed by Jasmyne Jaimes RN on 7/3/2025 at 3:32 PM

## 2025-07-03 NOTE — PROGRESS NOTES
V2.0    [facility] Progress Note      Name:  Darshana Grace /Age/Sex: 1945  (79 y.o. female)   MRN & CSN:  [id number] & [id number] Encounter Date/Time: 7/3/2025 8:48 AM EDT   Location:  [id number] PCP: Eugenia Lamb MD     Attending:Master Garcia DO       Hospital Day: 3  Brief HPI  Darshana Grace is a 79 y.o. female with pertinent PMHx of CVA, CKD, T2DM, HTN, HLD who presented with altered mental status and trouble speaking concerning for new acute CVA.  Assessment & Plan     Acute CVA  - CT head nonacute  - CTA head and neck with multiple areas of stenosis   - MRI brain remains pending  - Continue DAPT with aspirin 81 mg daily and Plavix 75 mg daily  - Continue atorvastatin 80 mg nightly  - Neurology following, pending MRI results patient may need neuroendovascular evaluation  - PT and OT recommending SNF    Urinary tract infection  - Continue IV Rocephin, plan for 5-day course of antibiotics    Essential hypertension  - Continue lisinopril     Depression  - Continue Lamictal and sertraline      Diet ADULT DIET; Easy to Chew; Low Fat/Low Chol/High Fiber/AZALEA   DVT Prophylaxis [x] Lovenox, []  Heparin, [] SCDs, [] Ambulation,  [] Eliquis, [] Xarelto  [] Coumadin   Code Status DNR-CCA   Disposition From: Home  Expected Disposition: SNF  Estimated Date of Discharge:            Subjective:     Chief Complaint: Altered mental status    Patient seen and examined today sitting up in bed  A lot more agitated today saying she wants to leave because she believes nursing and aides are not being nice to her  Stressed importance of new MRI to rule out worsening versus new stroke      Review of Systems:      Pertinent positives and negatives discussed in HPI    Objective:     Intake/Output Summary (Last 24 hours) at 7/3/2025 1254  Last data filed at 7/3/2025 0849  Gross per 24 hour   Intake 485 ml   Output --   Net 485 ml      Vitals:   Vitals:    25 0810 25 0849 25 0853

## 2025-07-03 NOTE — PROGRESS NOTES
PCA called this RN into room because patient was yelling. RN got in this room and tried to calm the patient down, patient stated \"I do not want to talk you\" and called this RN the N-word. RN attended to patient's needs, [place]ota care provided. Call light within reach.

## 2025-07-03 NOTE — PROGRESS NOTES
lethargy/Clinical concern for new acute stroke. . LKN was 1200 7/1.  CT head without acute intracranial findings, expected evolution of known left PCA infarct per read. Clinical exam to my encounter 7/2 is with NEW CARLITA from when previously seen last month. Per RN was reported to be present to her by night shift RN but this is not well documented.  She does have some RUE drift, as well as some possible sensory deficit to LLE. Expressive aphasia is worst from when last seen. Clinically concerning for new ischemic stroke, likely to posterior circulation, including brain stem. Though MRI brain obtained today was without clear new areas of infarct as discussed with radiology and reviewed, infact previously seen PCA infarct showed improved evolution on scan.     Her clinical exam is fluctuating, per me with NEW LLE weakness, and LUE sensory deficit.  Which is not clearly explained by her MRI findings.     She is being treated for acute UTI though would not explain new neurological deficits.     Discussed with radiology given inconsistent exam and MRI who felt that imaging was atypical for vasculitis.     Neurology attending at bedside to see patient as well. Case was also discusssed with stroke team.        2. New CARLITA:  3. Recent recurrent left PCA infarct:   4. Left PCA occlusion:   5. 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   6. UTI        Recommendations     - CTA head & neck STAT (ordered). No code stroke as LKN > 24hrs. Given flucuating exam.     - Repeat MRI brain w/o with thin brainstem slices.     - Neuroendovacular consultation is pending. While her MRI of her brain is without clear area of new infarct; there is concern she could be having stuttering, additional new areas of ischemia to posterior circulation given her fluctuations in exam. Discussed with neurology attending.     - Continue DAPT with 75mg Plavix and 81mg ASA.   - Continue high intensity  statin. LDL goal <70    - Allow BP to autoregulate <220/110 for another 24-48hrs then SLOWLY decrease towards normotension no more than 20%/day. Avoid hypotension and marked fluctuations as able given severe stenosis.     - Telemetry monitoring.     - Discussed with neurology attending, Q1H neuro checks. This will require transfer to ICU. Discussed with primary attending.       SEE MD ATTESTATION.       Ivonne Maxwell, CNP  [facility]    A copy of this note was provided for Master Amos,

## 2025-07-03 NOTE — PLAN OF CARE
Problem: Chronic Conditions and Co-morbidities  Goal: Patient's chronic conditions and co-morbidity symptoms are monitored and maintained or improved  7/2/2025 2251 by Chioma Nagel RN  Outcome: Progressing  7/2/2025 1150 by Fredy Hickey RN  Outcome: Progressing  7/2/2025 1123 by Fredy Hickey RN  Outcome: Progressing     Problem: Discharge Planning  Goal: Discharge to home or other facility with appropriate resources  7/2/2025 2251 by Chioma Nagel RN  Outcome: Progressing  7/2/2025 1150 by Fredy Hickey RN  Outcome: Progressing  7/2/2025 1123 by Fredy Hickey RN  Outcome: Progressing     Problem: Pain  Goal: Verbalizes/displays adequate comfort level or baseline comfort level  7/2/2025 2251 by Chioma Nagel RN  Outcome: Progressing  7/2/2025 1150 by Fredy Hickey RN  Outcome: Progressing  7/2/2025 1123 by Fredy Hickey RN  Outcome: Progressing     Problem: Skin/Tissue Integrity  Goal: Skin integrity remains intact  Description: 1.  Monitor for areas of redness and/or skin breakdown  2.  Assess vascular access sites hourly  3.  Every 4-6 hours minimum:  Change oxygen saturation probe site  4.  Every 4-6 hours:  If on nasal continuous positive airway pressure, respiratory therapy assess nares and determine need for appliance change or resting period  7/2/2025 2251 by Chioma Nagel RN  Outcome: Progressing  7/2/2025 1150 by Fredy Hickey RN  Outcome: Progressing  7/2/2025 1123 by Fredy Hickey RN  Outcome: Progressing     Problem: ABCDS Injury Assessment  Goal: Absence of physical injury  7/2/2025 2251 by Chioma Nagel RN  Outcome: Progressing  7/2/2025 1150 by Fredy Hickey RN  Outcome: Progressing  7/2/2025 1123 by Fredy Hickey RN  Outcome: Progressing     Problem: Safety - Adult  Goal: Free from fall injury  7/2/2025 2251 by Chioma Nagel RN  Outcome: Progressing  7/2/2025 1150 by Fredy Hickey RN  Outcome: Progressing  7/2/2025 1123 by Fredy Hickey RN  Outcome: Progressing     Problem: Neurosensory -

## 2025-07-03 NOTE — PROGRESS NOTES
Occupational Therapy      Attempted to see pt for OT tx. Pt in bed and declined therapy. Multiple attempts to encourage pt and pt just closed eyes. Will follow up as schedule and pt condition permit.    Electronically signed by Kavya Mcclendon OT on 7/3/2025 at 1:07 PM

## 2025-07-04 LAB
ANION GAP SERPL CALCULATED.3IONS-SCNC: 10 MMOL/L (ref 3–16)
BASOPHILS # BLD: 0.1 K/UL (ref 0–0.2)
BASOPHILS NFR BLD: 0.6 %
BUN SERPL-MCNC: 27 MG/DL (ref 7–20)
CALCIUM SERPL-MCNC: 9.5 MG/DL (ref 8.3–10.6)
CHLORIDE SERPL-SCNC: 98 MMOL/L (ref 99–110)
CO2 SERPL-SCNC: 29 MMOL/L (ref 21–32)
CREAT SERPL-MCNC: 0.9 MG/DL (ref 0.6–1.2)
DEPRECATED RDW RBC AUTO: 13.2 % (ref 12.4–15.4)
EOSINOPHIL # BLD: 0 K/UL (ref 0–0.6)
EOSINOPHIL NFR BLD: 0.3 %
GFR SERPLBLD CREATININE-BSD FMLA CKD-EPI: 65 ML/MIN/{1.73_M2}
GLUCOSE SERPL-MCNC: 96 MG/DL (ref 70–99)
HCT VFR BLD AUTO: 41.5 % (ref 36–48)
HGB BLD-MCNC: 14.1 G/DL (ref 12–16)
LYMPHOCYTES # BLD: 1.5 K/UL (ref 1–5.1)
LYMPHOCYTES NFR BLD: 15.9 %
MCH RBC QN AUTO: 30.8 PG (ref 26–34)
MCHC RBC AUTO-ENTMCNC: 33.9 G/DL (ref 31–36)
MCV RBC AUTO: 90.8 FL (ref 80–100)
MONOCYTES # BLD: 0.9 K/UL (ref 0–1.3)
MONOCYTES NFR BLD: 9.2 %
NEUTROPHILS # BLD: 7.1 K/UL (ref 1.7–7.7)
NEUTROPHILS NFR BLD: 74 %
PLATELET # BLD AUTO: 170 K/UL (ref 135–450)
PMV BLD AUTO: 9.3 FL (ref 5–10.5)
POTASSIUM SERPL-SCNC: 3.6 MMOL/L (ref 3.5–5.1)
RBC # BLD AUTO: 4.57 M/UL (ref 4–5.2)
SODIUM SERPL-SCNC: 137 MMOL/L (ref 136–145)
WBC # BLD AUTO: 9.6 K/UL (ref 4–11)

## 2025-07-04 PROCEDURE — 94760 N-INVAS EAR/PLS OXIMETRY 1: CPT

## 2025-07-04 PROCEDURE — 2500000003 HC RX 250 WO HCPCS: Performed by: HOSPITALIST

## 2025-07-04 PROCEDURE — 85025 COMPLETE CBC W/AUTO DIFF WBC: CPT

## 2025-07-04 PROCEDURE — 36415 COLL VENOUS BLD VENIPUNCTURE: CPT

## 2025-07-04 PROCEDURE — 6370000000 HC RX 637 (ALT 250 FOR IP): Performed by: HOSPITALIST

## 2025-07-04 PROCEDURE — 6360000002 HC RX W HCPCS: Performed by: HOSPITALIST

## 2025-07-04 PROCEDURE — 97129 THER IVNTJ 1ST 15 MIN: CPT

## 2025-07-04 PROCEDURE — 97535 SELF CARE MNGMENT TRAINING: CPT

## 2025-07-04 PROCEDURE — 97530 THERAPEUTIC ACTIVITIES: CPT

## 2025-07-04 PROCEDURE — 80048 BASIC METABOLIC PNL TOTAL CA: CPT

## 2025-07-04 PROCEDURE — 92507 TX SP LANG VOICE COMM INDIV: CPT

## 2025-07-04 PROCEDURE — 2100000000 HC CCU R&B

## 2025-07-04 PROCEDURE — 92526 ORAL FUNCTION THERAPY: CPT

## 2025-07-04 RX ADMIN — ATORVASTATIN CALCIUM 80 MG: 80 TABLET, FILM COATED ORAL at 20:37

## 2025-07-04 RX ADMIN — SODIUM CHLORIDE, PRESERVATIVE FREE 10 ML: 5 INJECTION INTRAVENOUS at 22:37

## 2025-07-04 RX ADMIN — SERTRALINE 50 MG: 50 TABLET, FILM COATED ORAL at 08:52

## 2025-07-04 RX ADMIN — PANTOPRAZOLE SODIUM 40 MG: 40 TABLET, DELAYED RELEASE ORAL at 08:52

## 2025-07-04 RX ADMIN — BACLOFEN 10 MG: 10 TABLET ORAL at 20:37

## 2025-07-04 RX ADMIN — ENOXAPARIN SODIUM 40 MG: 100 INJECTION SUBCUTANEOUS at 08:52

## 2025-07-04 RX ADMIN — CLOPIDOGREL BISULFATE 75 MG: 75 TABLET, FILM COATED ORAL at 08:52

## 2025-07-04 RX ADMIN — SODIUM CHLORIDE, PRESERVATIVE FREE 10 ML: 5 INJECTION INTRAVENOUS at 08:53

## 2025-07-04 RX ADMIN — Medication 2000 UNITS: at 08:52

## 2025-07-04 RX ADMIN — ASPIRIN 81 MG: 81 TABLET, CHEWABLE ORAL at 08:52

## 2025-07-04 RX ADMIN — LAMOTRIGINE 25 MG: 25 TABLET ORAL at 20:37

## 2025-07-04 RX ADMIN — LISINOPRIL 5 MG: 5 TABLET ORAL at 08:52

## 2025-07-04 ASSESSMENT — PAIN SCALES - GENERAL
PAINLEVEL_OUTOF10: 0

## 2025-07-04 NOTE — PLAN OF CARE
Problem: Chronic Conditions and Co-morbidities  Goal: Patient's chronic conditions and co-morbidity symptoms are monitored and maintained or improved  7/3/2025 2327 by Bruno Fontanez RN  Outcome: Progressing  Flowsheets (Taken 7/3/2025 2059)  Care Plan - Patient's Chronic Conditions and Co-Morbidity Symptoms are Monitored and Maintained or Improved:   Monitor and assess patient's chronic conditions and comorbid symptoms for stability, deterioration, or improvement   Collaborate with multidisciplinary team to address chronic and comorbid conditions and prevent exacerbation or deterioration  7/3/2025 1039 by Paty Glaser RN  Outcome: Progressing  Flowsheets (Taken 7/3/2025 0849)  Care Plan - Patient's Chronic Conditions and Co-Morbidity Symptoms are Monitored and Maintained or Improved: Monitor and assess patient's chronic conditions and comorbid symptoms for stability, deterioration, or improvement     Problem: Discharge Planning  Goal: Discharge to home or other facility with appropriate resources  7/3/2025 2327 by Bruno Fontanez, RN  Outcome: Progressing  Flowsheets (Taken 7/3/2025 2059)  Discharge to home or other facility with appropriate resources: Identify barriers to discharge with patient and caregiver  7/3/2025 1039 by Paty Glaser RN  Outcome: Progressing  Flowsheets (Taken 7/3/2025 0849)  Discharge to home or other facility with appropriate resources: Identify barriers to discharge with patient and caregiver     Problem: Pain  Goal: Verbalizes/displays adequate comfort level or baseline comfort level  7/3/2025 2327 by Bruno Fontanez, RN  Outcome: Progressing  Flowsheets (Taken 7/3/2025 2000)  Verbalizes/displays adequate comfort level or baseline comfort level:   Encourage patient to monitor pain and request assistance   Assess pain using appropriate pain scale   Administer analgesics based on type and severity of pain and evaluate response  7/3/2025 1039 by Paty Glaser

## 2025-07-04 NOTE — PROGRESS NOTES
Facility/Department: [address] CVICU   DYSPHAGIA THERAPY and SPEECH / LANGUAGE / COGNITIVE-LINGUISTIC TREATMENT    Patient: Darshana Grace   : 1945   MRN: [id number]      Treatment Date: 2025      Admitting Dx:   TIA (transient ischemic attack) [G45.9]  Stroke-like symptoms [R29.90]   has a past medical history of Arthritis, Bell's palsy, Cerebral artery occlusion with cerebral infarction (HCC), CKD (chronic kidney disease), Claustrophobia, Diabetes mellitus (HCC), Esophageal reflux, blood clots, Hyperlipidemia, Hypertension, Kidney stone, Nervous, Pulmonary embolism (HCC), and Stuttering.   has a past surgical history that includes Hysterectomy; Appendectomy; Cardiac catheterization; Cholecystectomy; Colonoscopy; Tonsillectomy; knee surgery (Right); blepharoplasty (Bilateral, 2021); Abdomen surgery; and Endoscopy, colon, diagnostic.  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 per \"family\"); seen recently during prior acute admission s/p stroke for speech/lang/cog  Dysphagia History including instrumental assessment: rec for easy chew/thin s/p prior stroke  GI history: h/o esophageal reflux  Prior level of function (communication, home/med/finance management, driving, etc) and living status: admitted from home alone following psych admission from acute care s/p stroke - patient reports home alone did not go well; prior to stroke, was independent for ADLs and IADLs, but reduced level of function/independence s/p stroke; was an active  prior to stroke, but not medically cleared to drive post-stroke; 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

## 2025-07-04 NOTE — PROGRESS NOTES
Physical Therapy  Pt transfer to ICU; PT to sign off.  Please reconsult when approp.  Shae Sinclair, PT

## 2025-07-04 NOTE — PROGRESS NOTES
Progress Note  The patient is being evaluated for suspected stroke.     Updates  - MRI brain pending  - patient reports she continues to have vision difficulities      Active Ambulatory Problems     Diagnosis Date Noted    Stroke-like symptoms 06/09/2025     Resolved Ambulatory Problems     Diagnosis Date Noted    No Resolved Ambulatory Problems     Past Medical History:   Diagnosis Date    Arthritis     Bell's palsy     Cerebral artery occlusion with cerebral infarction (HCC)     CKD (chronic kidney disease)     Claustrophobia     Diabetes mellitus (HCC)     Esophageal reflux     Hx of blood clots     Hyperlipidemia     Hypertension     Kidney stone     Nervous     Pulmonary embolism (HCC)     Stuttering        Current Facility-Administered Medications:     aspirin chewable tablet 81 mg, 81 mg, Oral, Daily, Valentina Borden Jr., MD, 81 mg at 07/03/25 0849    atorvastatin (LIPITOR) tablet 80 mg, 80 mg, Oral, Nightly, Valentina Bordne Jr., MD, 80 mg at 07/03/25 2110    baclofen (LIORESAL) tablet 10 mg, 10 mg, Oral, Nightly, Valentina Borden Jr., MD, 10 mg at 07/03/25 2110    Vitamin D (CHOLECALCIFEROL) tablet 2,000 Units, 2,000 Units, Oral, Daily, Valentina oBrden Jr., MD, 2,000 Units at 07/03/25 0849    lamoTRIgine (LAMICTAL) tablet 25 mg, 25 mg, Oral, Nightly, Valentina Borden Jr., MD, 25 mg at 07/03/25 2110    lisinopril (PRINIVIL;ZESTRIL) tablet 5 mg, 5 mg, Oral, Daily, Valentina Borden Jr., MD, 5 mg at 07/03/25 0849    pantoprazole (PROTONIX) tablet 40 mg, 40 mg, Oral, QAM AC, Valentina Borden Jr., MD, 40 mg at 07/03/25 0602    sertraline (ZOLOFT) tablet 50 mg, 50 mg, Oral, Daily, Valentina Borden Jr., MD, 50 mg at 07/03/25 0849    sodium chloride flush 0.9 % injection 5-40 mL, 5-40 mL, IntraVENous, 2 times per day, Valentina Borden Jr., MD, 10 mL at 07/03/25 2110    sodium chloride flush 0.9 % injection 5-40 mL, 5-40 mL, IntraVENous, PRN, Valentina Borden Jr., MD    0.9 % sodium chloride infusion, , IntraVENous, PRN,

## 2025-07-04 NOTE — PROGRESS NOTES
V2.0    [employer] [facility] [employer] Progress Note      Name:  Darshana Grace /Age/Sex: 1945  (79 y.o. female)   MRN & CSN:  [id number] & [id number] Encounter Date/Time: 2025 8:48 AM EDT   Location:  [id number] PCP: Eugenia Lamb MD     Attending:Master Garcia DO       Hospital Day: 4  Brief HPI  Darshana Grace is a 79 y.o. female with pertinent PMHx of CVA, CKD, T2DM, HTN, HLD who presented with altered mental status and trouble speaking concerning for new acute CVA.  Assessment & Plan     Acute CVA  - CT head nonacute x 2  - CTA head and neck with multiple areas of stenosis, no new or worsening stenosis noted on repeat CTA yesterday  - MRI brain with thin cuts to evaluate brainstem due to new CARLITA still pending  - Continue DAPT with aspirin 81 mg daily and Plavix 75 mg daily  - Continue atorvastatin 80 mg nightly  - Neurology following  - Neuroendovascular surgery consulted  - PT and OT recommending SNF  - Decrease neurochecks to every 2 hours    Urinary tract infection  - Complete antibiotic course with IV Rocephin    Essential hypertension  - Continue lisinopril     Depression  - Continue Lamictal and sertraline      Diet ADULT DIET; Easy to Chew; Low Fat/Low Chol/High Fiber/AZALEA   DVT Prophylaxis [x] Lovenox, []  Heparin, [] SCDs, [] Ambulation,  [] Eliquis, [] Xarelto  [] Coumadin   Code Status DNR-CCA   Disposition From: Home  Expected Disposition: SNF  Estimated Date of Discharge: -           Subjective:     Chief Complaint: Altered mental status    Patient seen and examined today lying in bed  Behavior much more appropriate today  Answering questions appropriately though speech still slow and broken at times  No complaints or concerns currently      Review of Systems:      Pertinent positives and negatives discussed in HPI    Objective:     Intake/Output Summary (Last 24 hours) at 2025 0731  Last data filed at 2025 0630  Gross per 24 hour   Intake 245 ml   Output 400 ml   Net

## 2025-07-04 NOTE — PROGRESS NOTES
2021       Eliane Lucas DO  [address] [facility] [address]  [facility] [address]  Via In Basket      Patient: Lori Aguilar   YOB: 1993   Date of Visit: 10/6/2021       Dear Dr. Lucas:    Thank you for referring Lori Aguilar to me for evaluation. Below are my notes for this visit with her.    If you have questions, please do not hesitate to call me. I look forward to following your patient along with you.      Sincerely,        Jacob Conklin MD        CC: No Recipients  Jacob Conklin MD  10/6/2021 12:39 PM  Sign when Signing Visit   Cardiology Consultation    Referring Physician:  Other Other    History of Present Illness:   Lori Aguilar is a 28 year old  female at 25 6/7 weeks gestation, here for initial consultation due to family history of hypoplasia of the right heart(nephew).  She is accompanied by her partner.      She reports that she is doing well.    Social History     Social History Narrative   • Not on file       No current outpatient medications on file.     No current facility-administered medications for this visit.       ALLERGIES:  Not on File    FETAL ECHOCARDIOGRAM SUMMARY:   1. Technically good fetal echocardiogram   2. Qualitatively normal biventricular size and systolic function  3. No significant structural cardiac abnormalities were seen.   4. Predominantly 1:1 AV conduction with normal heart rate.     PATIENT COUNSELING:    I counseled the patient regarding the indications, prompting this examination, reviewed the results of the study, its limitation and indications.  I have indicated to the patient that a family history of a first-degree relative confers additional risk for subsequent child having a congenital heart disease.  This risk is higher when compared to the general population  The type of defect is variable, ranging from small ventricular septal defects to complex congenital heart abnormality.      General limitations of fetal cardiac  Occupational Therapy  Pt transferred to CVICU. Will need new orders to resume OT services when medically appropriate.     Ye Colby, OTR/L   ultrasound were also reviewed.  These include, but are not limited to, the inability to exclude patent ductus arteriosus ( persistent), some atrial and ventricular septal defects; some arterial anomalies including aortic coarctation; some pulmonary or systemic venous anomaly; coronary artery anomaly; and minor valve abnormalities.  Nevertheless, I explained that this echocardiogram significantly reduces the risk of complex congenital heart disease.    RECOMMENDATIONS:    1. A Followup fetal echocardiogram is not indicated; however, we are available to rescan this patient if there are any new or persistent clinical concerns.  2.  echocardiogram is not indicated unless CHD is clinically suspected (murmur, hypoxia, acidosis, premature infant with concern for PDA, abnormal  screen, etc).     I met with Ms. Lori Aguilar and her . The total length of time of the encounter was 25 minutes, with greater than half of that time spent counseling the patient and/or coordinating care.      Jacob Conklin MD  Pediatric Cardiology  [facility]

## 2025-07-04 NOTE — PROGRESS NOTES
NAME:  Darshana Grace  YOB: 1945  MEDICAL RECORD NUMBER:  [id number]    Shift Summary: Neuro checks now Q2. Mentation fluctuates but neuro assessment essentially unchanged. Up to chair with stedy x1. Patient has no interest in eating even with encouragement and assistance.     Family updated: Yes:  at bedside    Rhythm: Normal Sinus Rhythm     Most recent vitals:   Visit Vitals  /65   Pulse 58   Temp 98.4 °F (36.9 °C) (Oral)   Resp 14   Ht 1.702 m (5' 7\")   Wt 67.9 kg (149 lb 11.1 oz)   SpO2 97%   BMI 23.45 kg/m²           No data found.    No data found.      Respiratory support needed (if any):  - RA    Admission weight Weight - Scale: 64.6 kg (142 lb 6.7 oz) (07/01/25 2118)    Today's weight    Wt Readings from Last 1 Encounters:   07/04/25 67.9 kg (149 lb 11.1 oz)        Gama need assessed each shift: N/A - no gama present  UOP >30ml/hr: YES  Last documented BM (in last 48 hrs):  Patient Vitals for the past 48 hrs:   Last BM (including prior to admit)   07/03/25 0849 07/02/25 07/03/25 1635 07/03/25 07/03/25 2059 07/03/25 07/04/25 0800 07/03/25                Restraints (in use currently or dc'd in last 12 hrs): No    Order current and documentation up to date? No    Lines/Drains reviewed @ bedside.  Peripheral IV 07/01/25 Left Antecubital (Active)   Number of days: 2         Drip rates at handoff:    sodium chloride         Lab Data:   CBC:   Recent Labs     07/03/25  0624 07/04/25  0811   WBC 10.4 9.6   HGB 14.5 14.1   HCT 42.2 41.5   MCV 90.7 90.8    170     BMP:    Recent Labs     07/03/25  0624 07/04/25  0811    137   K 3.2* 3.6   CO2 27 29   BUN 30* 27*   CREATININE 0.9 0.9     ABG: No results for input(s): \"PHART\", \"IUB1VCV\", \"PO2ART\" in the last 72 hours.    Any consults during the shift? No    Any signed and held orders to be released?  No        4 Eyes Skin Assessment       The patient is being assessed for  Shift Handoff    I agree that at least one RN

## 2025-07-04 NOTE — PROGRESS NOTES
Physical Therapy    [facility] - Physical Therapy   Phone: [phone]    Physical Therapy  Facility/Department:[address] PROGRESSIVE CARE  Re-Evaluation   [] Initial Evaluation            [x] Daily Treatment Note         [] Discharge Summary      Patient: Darshana Grace   : 1945   MRN: [id number]   Date of Service:  2025  Staff Mobility Recommendation: Stedy; assist x 1  AM-PAC score:   Discharge Recommendations: SNF   Darshana Grace scored a  on the AM-PAC short mobility form. Current research shows that an AM-PAC score of 17 or less is typically not associated with a discharge to the patient's home setting. Based on the patient's AM-PAC score and their current functional mobility deficits, it is recommended that the patient have 3-5 sessions per week of Physical Therapy at d/c to increase the patient's independence.  Please see assessment section for further patient specific details.    If patient discharges prior to next session this note will serve as a discharge summary.  Please see below for the latest assessment towards goals.      Admitting Diagnosis: Stroke-like symptoms  Ordering Physician: Dr Borden  Current Admission Summary: 73 y/o female admit 2025 with Slurred Speech, UTI, Elevated Troponin; concern for new acute CVA.  Recent hospital admit -2025 with Acute CVA L PCA territory; d/c to In-Pt Psych due to severe depressing and endorsing active suicidal Ideation (initial d/c plan intended to SNF setting).  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     Past Medical History:  has a past medical history of Arthritis, Bell's palsy, Cerebral artery occlusion with cerebral infarction (HCC), CKD (chronic kidney disease), Claustrophobia, Diabetes mellitus (HCC), Esophageal reflux, Hx of blood clots, Hyperlipidemia, Hypertension, Kidney stone, Nervous, Pulmonary embolism (HCC), and Stuttering.  Past Surgical History:  has a past surgical history that  Individual Group Co-treatment   Time In 0952       Time Out 1030       Minutes 38           Timed Code Treatment Minutes:     Total Treatment Minutes:  38 minutes    Minutes per charge:  Therapeutic activity: 38 minutes      Electronically signed by Shae Sinclair PT on 7/4/2025 at 11:54 AM

## 2025-07-04 NOTE — PROGRESS NOTES
Manual  Has the patient had two or more falls in the past year or any fall with injury in the past year?: Yes  Prior Level of Assist for ADLs: Independent  Prior Level of Assist for Homemaking: Independent  Prior Level of Assist for Ambulation: Independent household ambulator, with or without device (with walker)  Prior Level of Assist for Transfers: Independent  Active : Yes  Mode of Transportation: Car  Occupation: Retired  Additional Comments: Pt admitted to [facility] 6/9-6/18 with CVA, pt made comments about wanting to kill herself by stopping medications. D/C to [facility] 6/18- 6/24 and returned home.  Social information obtained from initial admit to acute care 6/2025.    Available Assistance at Discharge: unknown    Examination   Vision:   Vision: Impaired (unable to track waqas)  Vision Exceptions: Wears glasses for reading  Hearing:   Hearing: Within functional limits       Observation:   General Observation:  pleasant; tends to keep L eye closed, however when open observes deviating laterally; slight facial droop (chronic)  Posture:   Fair  Sensation:   denies numbness and tingling  Coordination Testing:   Finger/Thumb Opposition: Impaired on Left    ROM:   BUE decreased, but somewhat functional  Strength:   (L) Hand: +3      (R) Hand: +3    Therapist Clinical Decision Making (Complexity): medium complexity         Activities of Daily Living  Basic Activities of Daily Living  Feeding: setup assistance  Feeding Comments: pt required assist to manage containers, able to feed herself w/ increased time  Toileting Comments: Mia. Pt incontinent of urine upon arrival, total A for pericare while in stance at stedy. Mia replaced  General Comments: Pt is anticipated to require up to Max-DEP for full ADLs based on her strength, balance, cognition, and visual deficits  Instrumental Activities of Daily Living  No IADL completed on this date.    Functional Mobility  Bed Mobility:  Bed mobility not completed  Out 1459      Minutes 30           Timed Code Treatment Minutes: 30 Minutes  Total Treatment Minutes:  30 minutes       Minutes per charge:  Therapeutic activity: 15 minutes  ADL training: 15 minutes      Electronically signed by STEVEN Bryson on 7/4/2025 at 3:07 PM

## 2025-07-05 LAB
ANION GAP SERPL CALCULATED.3IONS-SCNC: 12 MMOL/L (ref 3–16)
BASOPHILS # BLD: 0.1 K/UL (ref 0–0.2)
BASOPHILS NFR BLD: 0.6 %
BUN SERPL-MCNC: 24 MG/DL (ref 7–20)
CALCIUM SERPL-MCNC: 9.3 MG/DL (ref 8.3–10.6)
CHLORIDE SERPL-SCNC: 99 MMOL/L (ref 99–110)
CO2 SERPL-SCNC: 27 MMOL/L (ref 21–32)
CREAT SERPL-MCNC: 0.7 MG/DL (ref 0.6–1.2)
DEPRECATED RDW RBC AUTO: 13.2 % (ref 12.4–15.4)
EOSINOPHIL # BLD: 0 K/UL (ref 0–0.6)
EOSINOPHIL NFR BLD: 0.3 %
GFR SERPLBLD CREATININE-BSD FMLA CKD-EPI: 88 ML/MIN/{1.73_M2}
GLUCOSE SERPL-MCNC: 86 MG/DL (ref 70–99)
HCT VFR BLD AUTO: 42.8 % (ref 36–48)
HGB BLD-MCNC: 14.5 G/DL (ref 12–16)
LYMPHOCYTES # BLD: 1.6 K/UL (ref 1–5.1)
LYMPHOCYTES NFR BLD: 16.8 %
MCH RBC QN AUTO: 30.7 PG (ref 26–34)
MCHC RBC AUTO-ENTMCNC: 33.8 G/DL (ref 31–36)
MCV RBC AUTO: 90.7 FL (ref 80–100)
MONOCYTES # BLD: 0.7 K/UL (ref 0–1.3)
MONOCYTES NFR BLD: 7.7 %
NEUTROPHILS # BLD: 7 K/UL (ref 1.7–7.7)
NEUTROPHILS NFR BLD: 74.6 %
PLATELET # BLD AUTO: 168 K/UL (ref 135–450)
PMV BLD AUTO: 9.6 FL (ref 5–10.5)
POTASSIUM SERPL-SCNC: 3.6 MMOL/L (ref 3.5–5.1)
RBC # BLD AUTO: 4.72 M/UL (ref 4–5.2)
SODIUM SERPL-SCNC: 138 MMOL/L (ref 136–145)
WBC # BLD AUTO: 9.3 K/UL (ref 4–11)

## 2025-07-05 PROCEDURE — 2140000000 HC CCU INTERMEDIATE R&B

## 2025-07-05 PROCEDURE — 6370000000 HC RX 637 (ALT 250 FOR IP): Performed by: HOSPITALIST

## 2025-07-05 PROCEDURE — 2500000003 HC RX 250 WO HCPCS: Performed by: HOSPITALIST

## 2025-07-05 PROCEDURE — 80048 BASIC METABOLIC PNL TOTAL CA: CPT

## 2025-07-05 PROCEDURE — 36415 COLL VENOUS BLD VENIPUNCTURE: CPT

## 2025-07-05 PROCEDURE — 6360000002 HC RX W HCPCS: Performed by: HOSPITALIST

## 2025-07-05 PROCEDURE — 85025 COMPLETE CBC W/AUTO DIFF WBC: CPT

## 2025-07-05 RX ADMIN — SODIUM CHLORIDE, PRESERVATIVE FREE 10 ML: 5 INJECTION INTRAVENOUS at 07:28

## 2025-07-05 RX ADMIN — Medication 2000 UNITS: at 07:28

## 2025-07-05 RX ADMIN — ASPIRIN 81 MG: 81 TABLET, CHEWABLE ORAL at 07:28

## 2025-07-05 RX ADMIN — LAMOTRIGINE 25 MG: 25 TABLET ORAL at 20:17

## 2025-07-05 RX ADMIN — PANTOPRAZOLE SODIUM 40 MG: 40 TABLET, DELAYED RELEASE ORAL at 06:34

## 2025-07-05 RX ADMIN — SERTRALINE 50 MG: 50 TABLET, FILM COATED ORAL at 07:28

## 2025-07-05 RX ADMIN — CLOPIDOGREL BISULFATE 75 MG: 75 TABLET, FILM COATED ORAL at 07:28

## 2025-07-05 RX ADMIN — ENOXAPARIN SODIUM 40 MG: 100 INJECTION SUBCUTANEOUS at 09:19

## 2025-07-05 RX ADMIN — LISINOPRIL 5 MG: 5 TABLET ORAL at 07:28

## 2025-07-05 RX ADMIN — BACLOFEN 10 MG: 10 TABLET ORAL at 20:17

## 2025-07-05 RX ADMIN — SODIUM CHLORIDE, PRESERVATIVE FREE 10 ML: 5 INJECTION INTRAVENOUS at 20:20

## 2025-07-05 RX ADMIN — ATORVASTATIN CALCIUM 80 MG: 80 TABLET, FILM COATED ORAL at 20:17

## 2025-07-05 ASSESSMENT — PAIN SCALES - GENERAL
PAINLEVEL_OUTOF10: 0

## 2025-07-05 NOTE — CARE COORDINATION
Chart reviewed.    Both [facility] are out of network and CANNOT accept this referral.    Call placed for [facility], Annabelle Hoyos, left HIPAA compliant message asking for returned call to discuss dc plan.    HERNANDEZ Salinas     Case Management   [phone]    7/5/2025  9:34 AM

## 2025-07-05 NOTE — PROGRESS NOTES
NAME:  Darshana Grace  YOB: 1945  MEDICAL RECORD NUMBER:  [id number]    Shift Summary: Pt alert,oriented x 1,follow commands,at room air.Neuro check Q 4 H.VS stable.Low urine output.Low appetite.Pt downgraded to PCU    Family updated: Yes:  Pt;s POA    Rhythm: Sinus Bradycardia    Most recent vitals:   Visit Vitals  BP (!) 158/66   Pulse 55   Temp 97.6 °F (36.4 °C) (Axillary)   Resp 15   Ht 1.702 m (5' 7\")   Wt 67.9 kg (149 lb 11.1 oz)   SpO2 98%   BMI 23.45 kg/m²           No data found.    No data found.      Respiratory support needed (if any):  - RA    Admission weight Weight - Scale: 64.6 kg (142 lb 6.7 oz) (07/01/25 2118)    Today's weight    Wt Readings from Last 1 Encounters:   07/04/25 67.9 kg (149 lb 11.1 oz)        Gama need assessed each shift: N/A - no gama present  UOP >30ml/hr: YES  Last documented BM (in last 48 hrs):  Patient Vitals for the past 48 hrs:   Last BM (including prior to admit) Stool Occurrence   07/03/25 2059 07/03/25 --   07/04/25 0800 07/03/25 --   07/04/25 2000 07/04/25 --   07/05/25 0649 07/05/25 --   07/05/25 0730 07/05/25 --   07/05/25 1100 07/05/25 1   07/05/25 1700 07/05/25 1                Restraints (in use currently or dc'd in last 12 hrs): No    Order current and documentation up to date? No    Lines/Drains reviewed @ bedside.  Peripheral IV 07/01/25 Left Antecubital (Active)   Number of days: 3         Drip rates at handoff:    sodium chloride         Lab Data:   CBC:   Recent Labs     07/04/25  0811 07/05/25  0649   WBC 9.6 9.3   HGB 14.1 14.5   HCT 41.5 42.8   MCV 90.8 90.7    168     BMP:    Recent Labs     07/04/25  0811 07/05/25  0649    138   K 3.6 3.6   CO2 29 27   BUN 27* 24*   CREATININE 0.9 0.7     ABG: No results for input(s): \"PHART\", \"VBT8CMK\", \"PO2ART\" in the last 72 hours.    Any consults during the shift? No    Any signed and held orders to be released?  No        4 Eyes Skin Assessment       The patient is being assessed

## 2025-07-05 NOTE — PROGRESS NOTES
Progress Note  The patient is being evaluated for suspected stroke.     Updates  - MRI brain pending  - clinically stable  - she feels better this morning      Active Ambulatory Problems     Diagnosis Date Noted    Stroke-like symptoms 06/09/2025     Resolved Ambulatory Problems     Diagnosis Date Noted    No Resolved Ambulatory Problems     Past Medical History:   Diagnosis Date    Arthritis     Bell's palsy     Cerebral artery occlusion with cerebral infarction (HCC)     CKD (chronic kidney disease)     Claustrophobia     Diabetes mellitus (HCC)     Esophageal reflux     Hx of blood clots     Hyperlipidemia     Hypertension     Kidney stone     Nervous     Pulmonary embolism (HCC)     Stuttering        Current Facility-Administered Medications:     aspirin chewable tablet 81 mg, 81 mg, Oral, Daily, Valentina Borden Jr., MD, 81 mg at 07/05/25 0728    atorvastatin (LIPITOR) tablet 80 mg, 80 mg, Oral, Nightly, Valentina Borden Jr., MD, 80 mg at 07/04/25 2037    baclofen (LIORESAL) tablet 10 mg, 10 mg, Oral, Nightly, Valentina Borden Jr., MD, 10 mg at 07/04/25 2037    Vitamin D (CHOLECALCIFEROL) tablet 2,000 Units, 2,000 Units, Oral, Daily, Valentina Borden Jr., MD, 2,000 Units at 07/05/25 0728    lamoTRIgine (LAMICTAL) tablet 25 mg, 25 mg, Oral, Nightly, Valentina Borden Jr., MD, 25 mg at 07/04/25 2037    lisinopril (PRINIVIL;ZESTRIL) tablet 5 mg, 5 mg, Oral, Daily, Valentina Borden Jr., MD, 5 mg at 07/05/25 0728    pantoprazole (PROTONIX) tablet 40 mg, 40 mg, Oral, QAM AC, Valentina Borden Jr., MD, 40 mg at 07/05/25 0634    sertraline (ZOLOFT) tablet 50 mg, 50 mg, Oral, Daily, Valentina Borden Jr., MD, 50 mg at 07/05/25 0728    sodium chloride flush 0.9 % injection 5-40 mL, 5-40 mL, IntraVENous, 2 times per day, Valentina Borden Jr., MD, 10 mL at 07/05/25 0728    sodium chloride flush 0.9 % injection 5-40 mL, 5-40 mL, IntraVENous, PRN, Dacgautam, Valentina BARKER Jr., MD    0.9 % sodium chloride infusion, , IntraVENous, PRN, Dacio,

## 2025-07-05 NOTE — PLAN OF CARE
Problem: Chronic Conditions and Co-morbidities  Goal: Patient's chronic conditions and co-morbidity symptoms are monitored and maintained or improved  Outcome: Progressing  Flowsheets (Taken 7/4/2025 2000 by Bruno Fontanez, RN)  Care Plan - Patient's Chronic Conditions and Co-Morbidity Symptoms are Monitored and Maintained or Improved:   Monitor and assess patient's chronic conditions and comorbid symptoms for stability, deterioration, or improvement   Collaborate with multidisciplinary team to address chronic and comorbid conditions and prevent exacerbation or deterioration     Problem: Discharge Planning  Goal: Discharge to home or other facility with appropriate resources  Outcome: Progressing  Flowsheets (Taken 7/4/2025 2000 by Bruno Fontanez, RN)  Discharge to home or other facility with appropriate resources: Identify barriers to discharge with patient and caregiver     Problem: Pain  Goal: Verbalizes/displays adequate comfort level or baseline comfort level  Outcome: Progressing  Flowsheets (Taken 7/5/2025 0916)  Verbalizes/displays adequate comfort level or baseline comfort level:   Encourage patient to monitor pain and request assistance   Assess pain using appropriate pain scale   Administer analgesics based on type and severity of pain and evaluate response   Implement non-pharmacological measures as appropriate and evaluate response     Problem: Skin/Tissue Integrity  Goal: Skin integrity remains intact  Description: 1.  Monitor for areas of redness and/or skin breakdown  2.  Assess vascular access sites hourly  3.  Every 4-6 hours minimum:  Change oxygen saturation probe site  4.  Every 4-6 hours:  If on nasal continuous positive airway pressure, respiratory therapy assess nares and determine need for appliance change or resting period  Outcome: Progressing  Flowsheets  Taken 7/5/2025 0916 by Jean Ramirez, RN  Skin Integrity Remains Intact:   Monitor for areas of redness and/or skin

## 2025-07-05 NOTE — PROGRESS NOTES
was informed that MRI team doesn't come during weekend.MD informed this RN that it is OK to have MRI for Monday.

## 2025-07-05 NOTE — PROGRESS NOTES
V2.0    [facility] Progress Note      Name:  Darshana Grace /Age/Sex: 1945  (79 y.o. female)   MRN & CSN:  [id number] & [id number] Encounter Date/Time: 2025 8:48 AM EDT   Location:  [id number] PCP: Eugenia Lamb MD     Attending:Master Garcia DO       Hospital Day: 5  Brief HPI  Darshana Grace is a 79 y.o. female with pertinent PMHx of CVA, CKD, T2DM, HTN, HLD who presented with altered mental status and trouble speaking concerning for new acute CVA.  Assessment & Plan     Acute CVA  - CT head nonacute x 2  - CTA head and neck with multiple areas of stenosis, no new or worsening stenosis noted on repeat CTA yesterday  - MRI brain with thin cuts to evaluate brainstem due to new CARLITA remains pending  - Continue DAPT with aspirin 81 mg daily and Plavix 75 mg daily  - Continue atorvastatin 80 mg nightly  - Neurology following  - Neuroendovascular surgery consulted  - PT and OT recommending SNF  - Okay to decrease neurochecks to every 4 hours  - Transfer out of the ICU today    Urinary tract infection, resolved  - Completed antibiotic course with IV Rocephin    Essential hypertension  - Continue lisinopril     Depression  - Continue Lamictal and sertraline      Diet ADULT DIET; Easy to Chew; Low Fat/Low Chol/High Fiber/AZALEA   DVT Prophylaxis [x] Lovenox, []  Heparin, [] SCDs, [] Ambulation,  [] Eliquis, [] Xarelto  [] Coumadin   Code Status DNR-CCA   Disposition From: Home  Expected Disposition: SNF  Estimated Date of Discharge: -           Subjective:     Chief Complaint: Altered mental status    Patient seen and examined today laying in bed  Denies any complaints or concerns currently  Still has the weakness in her right side which has been chronic for presentation  Still has gaze deviation CARLITA  No worsening neurostatus      Review of Systems:      Pertinent positives and negatives discussed in HPI    Objective:     Intake/Output Summary (Last 24 hours) at 2025 4765  Last

## 2025-07-05 NOTE — PROGRESS NOTES
NAME:  Darshana Grace  YOB: 1945  MEDICAL RECORD NUMBER:  [id number]    Shift Summary: Neuro assessments remains stable. Somewhat improvement in strength in left upper extremity. No acute events overnight.     Family updated: No    Rhythm: Sinus bradycardia.     Most recent vitals:   Visit Vitals  BP (!) 185/81   Pulse 51   Temp 98 °F (36.7 °C) (Oral)   Resp 11   Ht 1.702 m (5' 7\")   Wt 67.9 kg (149 lb 11.1 oz)   SpO2 100%   BMI 23.45 kg/m²           No data found.    No data found.      Respiratory support needed (if any):  - RA    Admission weight Weight - Scale: 64.6 kg (142 lb 6.7 oz) (07/01/25 2118)    Today's weight    Wt Readings from Last 1 Encounters:   07/04/25 67.9 kg (149 lb 11.1 oz)        Gama need assessed each shift: N/A - no gama present  UOP >30ml/hr: YES  Last documented BM (in last 48 hrs):  Patient Vitals for the past 48 hrs:   Last BM (including prior to admit)   07/03/25 0849 07/02/25 07/03/25 1635 07/03/25 07/03/25 2059 07/03/25 07/04/25 0800 07/03/25 07/04/25 2000 07/04/25 07/05/25 0649 07/05/25                Restraints (in use currently or dc'd in last 12 hrs): No    Order current and documentation up to date? No    Lines/Drains reviewed @ bedside.  Peripheral IV 07/01/25 Left Antecubital (Active)   Number of days: 3         Drip rates at handoff:    sodium chloride         Lab Data:   CBC:   Recent Labs     07/03/25  0624 07/04/25  0811   WBC 10.4 9.6   HGB 14.5 14.1   HCT 42.2 41.5   MCV 90.7 90.8    170     BMP:    Recent Labs     07/03/25  0624 07/04/25  0811    137   K 3.2* 3.6   CO2 27 29   BUN 30* 27*   CREATININE 0.9 0.9     ABG: No results for input(s): \"PHART\", \"VZM3XBC\", \"PO2ART\" in the last 72 hours.    Any consults during the shift? No    Any signed and held orders to be released?  No        4 Eyes Skin Assessment       The patient is being assessed for  Shift Handoff    I agree that at least one RN has performed a thorough Head

## 2025-07-06 LAB
ANION GAP SERPL CALCULATED.3IONS-SCNC: 9 MMOL/L (ref 3–16)
BASE EXCESS BLDV CALC-SCNC: 5.9 MMOL/L
BASOPHILS # BLD: 0.1 K/UL (ref 0–0.2)
BASOPHILS NFR BLD: 0.7 %
BUN SERPL-MCNC: 19 MG/DL (ref 7–20)
CALCIUM SERPL-MCNC: 9.2 MG/DL (ref 8.3–10.6)
CHLORIDE SERPL-SCNC: 97 MMOL/L (ref 99–110)
CO2 BLDV-SCNC: 33 MMOL/L
CO2 SERPL-SCNC: 29 MMOL/L (ref 21–32)
COHGB MFR BLDV: 1.7 %
CREAT SERPL-MCNC: 0.8 MG/DL (ref 0.6–1.2)
DEPRECATED RDW RBC AUTO: 12.8 % (ref 12.4–15.4)
EOSINOPHIL # BLD: 0 K/UL (ref 0–0.6)
EOSINOPHIL NFR BLD: 0.3 %
GFR SERPLBLD CREATININE-BSD FMLA CKD-EPI: 75 ML/MIN/{1.73_M2}
GLUCOSE BLD-MCNC: 108 MG/DL (ref 70–99)
GLUCOSE SERPL-MCNC: 106 MG/DL (ref 70–99)
HCO3 BLDV-SCNC: 31 MMOL/L (ref 23–29)
HCT VFR BLD AUTO: 41.9 % (ref 36–48)
HGB BLD-MCNC: 14.4 G/DL (ref 12–16)
LYMPHOCYTES # BLD: 1.2 K/UL (ref 1–5.1)
LYMPHOCYTES NFR BLD: 14.3 %
MCH RBC QN AUTO: 30.9 PG (ref 26–34)
MCHC RBC AUTO-ENTMCNC: 34.4 G/DL (ref 31–36)
MCV RBC AUTO: 89.9 FL (ref 80–100)
METHGB MFR BLDV: 0.7 %
MONOCYTES # BLD: 0.8 K/UL (ref 0–1.3)
MONOCYTES NFR BLD: 9 %
NEUTROPHILS # BLD: 6.6 K/UL (ref 1.7–7.7)
NEUTROPHILS NFR BLD: 75.7 %
O2 THERAPY: ABNORMAL
PCO2 BLDV: 45.7 MMHG (ref 40–50)
PERFORMED ON: ABNORMAL
PH BLDV: 7.44 [PH] (ref 7.35–7.45)
PLATELET # BLD AUTO: 169 K/UL (ref 135–450)
PMV BLD AUTO: 9.2 FL (ref 5–10.5)
PO2 BLDV: 58 MMHG
POTASSIUM SERPL-SCNC: 3.8 MMOL/L (ref 3.5–5.1)
RBC # BLD AUTO: 4.66 M/UL (ref 4–5.2)
SAO2 % BLDV: 93 %
SODIUM SERPL-SCNC: 135 MMOL/L (ref 136–145)
WBC # BLD AUTO: 8.8 K/UL (ref 4–11)

## 2025-07-06 PROCEDURE — 82803 BLOOD GASES ANY COMBINATION: CPT

## 2025-07-06 PROCEDURE — 6360000002 HC RX W HCPCS: Performed by: HOSPITALIST

## 2025-07-06 PROCEDURE — 2140000000 HC CCU INTERMEDIATE R&B

## 2025-07-06 PROCEDURE — 6370000000 HC RX 637 (ALT 250 FOR IP): Performed by: HOSPITALIST

## 2025-07-06 PROCEDURE — 80048 BASIC METABOLIC PNL TOTAL CA: CPT

## 2025-07-06 PROCEDURE — 85025 COMPLETE CBC W/AUTO DIFF WBC: CPT

## 2025-07-06 PROCEDURE — 36415 COLL VENOUS BLD VENIPUNCTURE: CPT

## 2025-07-06 RX ADMIN — BACLOFEN 10 MG: 10 TABLET ORAL at 22:43

## 2025-07-06 RX ADMIN — ASPIRIN 81 MG: 81 TABLET, CHEWABLE ORAL at 10:48

## 2025-07-06 RX ADMIN — CLOPIDOGREL BISULFATE 75 MG: 75 TABLET, FILM COATED ORAL at 10:52

## 2025-07-06 RX ADMIN — Medication 2000 UNITS: at 14:04

## 2025-07-06 RX ADMIN — LAMOTRIGINE 25 MG: 25 TABLET ORAL at 22:43

## 2025-07-06 RX ADMIN — ENOXAPARIN SODIUM 40 MG: 100 INJECTION SUBCUTANEOUS at 10:52

## 2025-07-06 RX ADMIN — ATORVASTATIN CALCIUM 80 MG: 80 TABLET, FILM COATED ORAL at 22:43

## 2025-07-06 RX ADMIN — LISINOPRIL 5 MG: 5 TABLET ORAL at 10:51

## 2025-07-06 RX ADMIN — SERTRALINE 50 MG: 50 TABLET, FILM COATED ORAL at 10:53

## 2025-07-06 ASSESSMENT — PAIN SCALES - GENERAL
PAINLEVEL_OUTOF10: 0
PAINLEVEL_OUTOF10: 0

## 2025-07-06 NOTE — PROGRESS NOTES
Went to give pt her AM protonix and unable to arouse pt. After sternal rub, pt still only grunting and not verbally communicating how she was on previous assemssments. BG BARTHL. On call neurologist paged at this time.

## 2025-07-06 NOTE — PROGRESS NOTES
V2.0    [facility] Progress Note      Name:  Darshana Grace /Age/Sex: 1945  (79 y.o. female)   MRN & CSN:  [id number] & [id number] Encounter Date/Time: 2025 8:48 AM EDT   Location:  [id number] PCP: Eugenia Lamb MD     Attending:Master Garcia DO       Hospital Day: 6  Brief HPI  Darshana Grace is a 79 y.o. female with pertinent PMHx of CVA, CKD, T2DM, HTN, HLD who presented with altered mental status and trouble speaking concerning for new acute CVA.  Assessment & Plan     Acute CVA  - CT head nonacute x 2  - CTA head and neck with multiple areas of stenosis, no new or worsening stenosis noted on repeat CTA yesterday  - MRI brain with thin cuts to evaluate brainstem due to new CARLITA remains pending, likely be completed tomorrow  - Continue DAPT with aspirin 81 mg daily and Plavix 75 mg daily  - Continue atorvastatin 80 mg nightly  - Neuroendovascular surgery consulted, though do not suspect that there would be any intervention recommended  - Neurology following    Altered mental status  - Most likely waxing waning periods of delirium which make accurate assessment neurostatus more challenging    Urinary tract infection, resolved  - Completed antibiotic course with IV Rocephin    Essential hypertension  - Continue lisinopril     Depression  - Continue Lamictal and sertraline      Diet ADULT DIET; Easy to Chew; Low Fat/Low Chol/High Fiber/AZALEA   DVT Prophylaxis [x] Lovenox, []  Heparin, [] SCDs, [] Ambulation,  [] Eliquis, [] Xarelto  [] Coumadin   Code Status DNR-CCA   Disposition From: Home  Expected Disposition: SNF  Estimated Date of Discharge:            Subjective:     Chief Complaint: Altered mental status    Patient was seen and examined today lying in bed  Had concerns for mentation overnight, became less responsive and had some fluctuating changes in strength of left  Appears back to her baseline for me this morning  No complaints or concerns currently    Review of  to clear up my fluid in her ears - is what is on her other message

## 2025-07-06 NOTE — PROGRESS NOTES
Progress Note  The patient is being evaluated for suspected stroke.     Updates  - MRI brain pending  - overnight waxing/waning encephalopathy      Active Ambulatory Problems     Diagnosis Date Noted    Stroke-like symptoms 06/09/2025     Resolved Ambulatory Problems     Diagnosis Date Noted    No Resolved Ambulatory Problems     Past Medical History:   Diagnosis Date    Arthritis     Bell's palsy     Cerebral artery occlusion with cerebral infarction (HCC)     CKD (chronic kidney disease)     Claustrophobia     Diabetes mellitus (HCC)     Esophageal reflux     Hx of blood clots     Hyperlipidemia     Hypertension     Kidney stone     Nervous     Pulmonary embolism (HCC)     Stuttering        Current Facility-Administered Medications:     aspirin chewable tablet 81 mg, 81 mg, Oral, Daily, Valentina Borden Jr., MD, 81 mg at 07/06/25 1048    atorvastatin (LIPITOR) tablet 80 mg, 80 mg, Oral, Nightly, Valentina Borden Jr., MD, 80 mg at 07/05/25 2017    baclofen (LIORESAL) tablet 10 mg, 10 mg, Oral, Nightly, Valentina Borden Jr., MD, 10 mg at 07/05/25 2017    Vitamin D (CHOLECALCIFEROL) tablet 2,000 Units, 2,000 Units, Oral, Daily, Valentina Borden Jr., MD, 2,000 Units at 07/05/25 0728    lamoTRIgine (LAMICTAL) tablet 25 mg, 25 mg, Oral, Nightly, Valentina Borden Jr., MD, 25 mg at 07/05/25 2017    lisinopril (PRINIVIL;ZESTRIL) tablet 5 mg, 5 mg, Oral, Daily, Valentina Borden Jr., MD, 5 mg at 07/06/25 1051    pantoprazole (PROTONIX) tablet 40 mg, 40 mg, Oral, QAM AC, Valentina Borden Jr., MD, 40 mg at 07/05/25 0634    sertraline (ZOLOFT) tablet 50 mg, 50 mg, Oral, Daily, Valentina Borden Jr., MD, 50 mg at 07/06/25 1053    sodium chloride flush 0.9 % injection 5-40 mL, 5-40 mL, IntraVENous, 2 times per day, Valentina Borden Jr., MD, 10 mL at 07/05/25 2020    sodium chloride flush 0.9 % injection 5-40 mL, 5-40 mL, IntraVENous, PRN, Suha Valentina E Jr., MD    0.9 % sodium chloride infusion, , IntraVENous, PRN, Valentina Borden Jr.,

## 2025-07-06 NOTE — PLAN OF CARE
Problem: Chronic Conditions and Co-morbidities  Goal: Patient's chronic conditions and co-morbidity symptoms are monitored and maintained or improved  Outcome: Progressing  Flowsheets (Taken 7/5/2025 2033)  Care Plan - Patient's Chronic Conditions and Co-Morbidity Symptoms are Monitored and Maintained or Improved: Monitor and assess patient's chronic conditions and comorbid symptoms for stability, deterioration, or improvement     Problem: Discharge Planning  Goal: Discharge to home or other facility with appropriate resources  Outcome: Progressing  Flowsheets (Taken 7/5/2025 2033)  Discharge to home or other facility with appropriate resources: Identify barriers to discharge with patient and caregiver     Problem: Pain  Goal: Verbalizes/displays adequate comfort level or baseline comfort level  Outcome: Progressing     Problem: Skin/Tissue Integrity  Goal: Skin integrity remains intact  Description: 1.  Monitor for areas of redness and/or skin breakdown  2.  Assess vascular access sites hourly  3.  Every 4-6 hours minimum:  Change oxygen saturation probe site  4.  Every 4-6 hours:  If on nasal continuous positive airway pressure, respiratory therapy assess nares and determine need for appliance change or resting period  Outcome: Progressing     Problem: ABCDS Injury Assessment  Goal: Absence of physical injury  Outcome: Progressing     Problem: Safety - Adult  Goal: Free from fall injury  Outcome: Progressing     Problem: Neurosensory - Adult  Goal: Achieves stable or improved neurological status  Outcome: Progressing  Goal: Achieves maximal functionality and self care  Outcome: Progressing

## 2025-07-06 NOTE — PROGRESS NOTES
NAME:  Darshana Grace  YOB: 1945  MEDICAL RECORD NUMBER:  [id number]    Shift Summary: Neuro status waxing and waning overnight. At change of shift, pt only responsive to painful stimuli and is nonverbal. Neurology has been notified. Believed to be related to delirium, but may get repeat CT in a few hours. VS otherwise stable.     Family updated: No    Rhythm: Sinus bradycardia.     Most recent vitals:   Visit Vitals  BP (!) 146/73   Pulse 51   Temp 97.8 °F (36.6 °C) (Axillary)   Resp 14   Ht 1.702 m (5' 7\")   Wt 70.6 kg (155 lb 10.3 oz)   SpO2 98%   BMI 24.38 kg/m²           No data found.    No data found.      Respiratory support needed (if any):  - RA    Admission weight Weight - Scale: 64.6 kg (142 lb 6.7 oz) (07/01/25 2118)    Today's weight    Wt Readings from Last 1 Encounters:   07/06/25 70.6 kg (155 lb 10.3 oz)        Gama need assessed each shift: N/A - no gama present  UOP >30ml/hr: YES  Last documented BM (in last 48 hrs):  Patient Vitals for the past 48 hrs:   Last BM (including prior to admit) Stool Occurrence   07/04/25 0800 07/03/25 --   07/04/25 2000 07/04/25 --   07/05/25 0649 07/05/25 --   07/05/25 0730 07/05/25 --   07/05/25 1100 07/05/25 1   07/05/25 1700 07/05/25 1                Restraints (in use currently or dc'd in last 12 hrs): No    Order current and documentation up to date? No    Lines/Drains reviewed @ bedside.  Peripheral IV 07/01/25 Left Antecubital (Active)   Number of days: 4         Drip rates at handoff:    sodium chloride         Lab Data:   CBC:   Recent Labs     07/04/25  0811 07/05/25  0649   WBC 9.6 9.3   HGB 14.1 14.5   HCT 41.5 42.8   MCV 90.8 90.7    168     BMP:    Recent Labs     07/04/25  0811 07/05/25  0649    138   K 3.6 3.6   CO2 29 27   BUN 27* 24*   CREATININE 0.9 0.7     ABG: No results for input(s): \"PHART\", \"KBR2BPV\", \"PO2ART\" in the last 72 hours.    Any consults during the shift? No    Any signed and held orders to be  released?  No        4 Eyes Skin Assessment       The patient is being assessed for  Shift Handoff    I agree that at least one RN has performed a thorough Head to Toe Skin Assessment on the patient. ALL assessment sites listed below have been assessed.      Areas assessed by both nurses: Head, Face, Ears, Shoulders, Back, Chest, Arms, Elbows, Hands, Sacrum. Buttock, Coccyx, Ischium, Legs. Feet and Heels, and Under Medical Devices         Does the Patient have a Wound? No noted wound(s)    Wound Care Orders initiated or active by RN: No       Alhaji Prevention initiated or active: Yes    Pressure Injury (Stage 3,4, Unstageable, DTI, NWPT, and Complex wounds): No  If present, place \"IP Wound Care/Ostomy Nurse Eval and Treat\" in : No    Ostomies present: No  If new Ostomy, place \"IP Wound Care/Ostomy Nurse Eval and Treat\" in : No     Nurse 1 eSignature: Electronically signed by Bruno Fontanez RN on 7/6/25 at 7:19 AM EDT    **SHARE this note so that the co-signing nurse can place an eSignature**    Nurse 2 eSignature: Electronically signed by Eloisa Winter RN on 7/6/25 at 5:20 PM EDT

## 2025-07-06 NOTE — PLAN OF CARE
Problem: Chronic Conditions and Co-morbidities  Goal: Patient's chronic conditions and co-morbidity symptoms are monitored and maintained or improved  Outcome: Progressing     Problem: Discharge Planning  Goal: Discharge to home or other facility with appropriate resources  Outcome: Progressing     Problem: Pain  Goal: Verbalizes/displays adequate comfort level or baseline comfort level  Outcome: Progressing     Problem: Skin/Tissue Integrity  Goal: Skin integrity remains intact  Description: 1.  Monitor for areas of redness and/or skin breakdown  2.  Assess vascular access sites hourly  3.  Every 4-6 hours minimum:  Change oxygen saturation probe site  4.  Every 4-6 hours:  If on nasal continuous positive airway pressure, respiratory therapy assess nares and determine need for appliance change or resting period  Outcome: Progressing     Problem: ABCDS Injury Assessment  Goal: Absence of physical injury  Outcome: Progressing     Problem: Safety - Adult  Goal: Free from fall injury  Outcome: Progressing     Problem: Neurosensory - Adult  Goal: Achieves stable or improved neurological status  Outcome: Progressing  Goal: Achieves maximal functionality and self care  Outcome: Progressing

## 2025-07-06 NOTE — PROGRESS NOTES
Spoke with Dr. Harris regarding neuro status changes. Likely r/t delirium. Advised to continue monitoring for now. May need Seroquel on board should symptoms persist.

## 2025-07-06 NOTE — PROGRESS NOTES
Spoke with Dr. Miranda. Believes may be related to waxing/waining with delirium. Monitor vitals for now, may get repeat head CT in the next couple of hours.

## 2025-07-06 NOTE — PROGRESS NOTES
NAME:  Darshana Grace  YOB: 1945  MEDICAL RECORD NUMBER:  [id number]    Shift Summary: Patient had uneventful shift, up to chair with steady once, intermittently compliant with acts of care and assessments, patient has verbal response when compliant, able to move both arms and legs, poor oral intake so supplements ordered    Family updated: Yes:  friends at bedside w POA    Rhythm: Normal Sinus Rhythm     Most recent vitals:   Visit Vitals  /71   Pulse 68   Temp 97.2 °F (36.2 °C) (Oral)   Resp 13   Ht 1.702 m (5' 7\")   Wt 70.6 kg (155 lb 10.3 oz)   SpO2 95%   BMI 24.38 kg/m²           No data found.    No data found.      Respiratory support needed (if any):  - RA    Admission weight Weight - Scale: 64.6 kg (142 lb 6.7 oz) (07/01/25 2118)    Today's weight    Wt Readings from Last 1 Encounters:   07/06/25 70.6 kg (155 lb 10.3 oz)        Gama need assessed each shift: N/A - no gama present  UOP >30ml/hr: NO - provider notified, renal function with acceptable ranges per Dr. mccarthy  Last documented BM (in last 48 hrs):  Patient Vitals for the past 48 hrs:   Last BM (including prior to admit) Stool Occurrence   07/04/25 2000 07/04/25 --   07/05/25 0649 07/05/25 --   07/05/25 0730 07/05/25 --   07/05/25 1100 07/05/25 1   07/05/25 1700 07/05/25 1                Restraints (in use currently or dc'd in last 12 hrs): No    Order current and documentation up to date? No    Lines/Drains reviewed @ bedside.  Peripheral IV 07/01/25 Left Antecubital (Active)   Number of days: 4         Drip rates at handoff:    sodium chloride         Lab Data:   CBC:   Recent Labs     07/05/25  0649 07/06/25  0906   WBC 9.3 8.8   HGB 14.5 14.4   HCT 42.8 41.9   MCV 90.7 89.9    169     BMP:    Recent Labs     07/05/25  0649 07/06/25  0906    135*   K 3.6 3.8   CO2 27 29   BUN 24* 19   CREATININE 0.7 0.8     ABG: No results for input(s): \"PHART\", \"SZO4VAJ\", \"PO2ART\" in the last 72 hours.    Any consults

## 2025-07-06 NOTE — CARE COORDINATION
PARESH reached out to the following facilities to check the status of referrals received:    [facility]-[phone]. SW left message for Jeannette in admissions today.     [facility]-[phone]. PARESH spoke to Cayla in admissions today. She stated that she will check this referral and will call Scarlet (primary CM) with an answer.     Respectfully submitted,    Shonda GONG, YUAN-S  [facility]   [phone]    Electronically signed by FARIDEH Cuevas, DAMIONW on 7/6/2025 at 9:33 AM

## 2025-07-06 NOTE — PROGRESS NOTES
Pt no longer oriented to self. States that she is scared and believes that people are \"coming to get her.\" Pt continues to be unable to answer subsequent orientation questions. Hand  are slightly weaker on left side. On-call neurologist paged at this time.

## 2025-07-07 ENCOUNTER — APPOINTMENT (OUTPATIENT)
Dept: MRI IMAGING | Age: 80
End: 2025-07-07
Payer: MEDICARE

## 2025-07-07 LAB
ANION GAP SERPL CALCULATED.3IONS-SCNC: 10 MMOL/L (ref 3–16)
BASOPHILS # BLD: 0.1 K/UL (ref 0–0.2)
BASOPHILS NFR BLD: 1 %
BUN SERPL-MCNC: 17 MG/DL (ref 7–20)
CALCIUM SERPL-MCNC: 9.2 MG/DL (ref 8.3–10.6)
CHLORIDE SERPL-SCNC: 98 MMOL/L (ref 99–110)
CO2 SERPL-SCNC: 23 MMOL/L (ref 21–32)
CREAT SERPL-MCNC: 0.7 MG/DL (ref 0.6–1.2)
DEPRECATED RDW RBC AUTO: 12.8 % (ref 12.4–15.4)
EOSINOPHIL # BLD: 0 K/UL (ref 0–0.6)
EOSINOPHIL NFR BLD: 0.2 %
GFR SERPLBLD CREATININE-BSD FMLA CKD-EPI: 88 ML/MIN/{1.73_M2}
GLUCOSE SERPL-MCNC: 110 MG/DL (ref 70–99)
HCT VFR BLD AUTO: 41.3 % (ref 36–48)
HGB BLD-MCNC: 14.6 G/DL (ref 12–16)
LYMPHOCYTES # BLD: 1.8 K/UL (ref 1–5.1)
LYMPHOCYTES NFR BLD: 19.2 %
MAGNESIUM SERPL-MCNC: 2.2 MG/DL (ref 1.8–2.4)
MCH RBC QN AUTO: 31.4 PG (ref 26–34)
MCHC RBC AUTO-ENTMCNC: 35.3 G/DL (ref 31–36)
MCV RBC AUTO: 89.1 FL (ref 80–100)
MONOCYTES # BLD: 0.9 K/UL (ref 0–1.3)
MONOCYTES NFR BLD: 9.8 %
NEUTROPHILS # BLD: 6.6 K/UL (ref 1.7–7.7)
NEUTROPHILS NFR BLD: 69.8 %
PLATELET # BLD AUTO: 172 K/UL (ref 135–450)
PMV BLD AUTO: 9.1 FL (ref 5–10.5)
POTASSIUM SERPL-SCNC: 3.4 MMOL/L (ref 3.5–5.1)
RBC # BLD AUTO: 4.64 M/UL (ref 4–5.2)
REASON FOR REJECTION: NORMAL
REJECTED TEST: NORMAL
SODIUM SERPL-SCNC: 131 MMOL/L (ref 136–145)
WBC # BLD AUTO: 9.4 K/UL (ref 4–11)

## 2025-07-07 PROCEDURE — 2580000003 HC RX 258: Performed by: STUDENT IN AN ORGANIZED HEALTH CARE EDUCATION/TRAINING PROGRAM

## 2025-07-07 PROCEDURE — 6360000002 HC RX W HCPCS: Performed by: HOSPITALIST

## 2025-07-07 PROCEDURE — 9990000010 HC NO CHARGE VISIT

## 2025-07-07 PROCEDURE — 36415 COLL VENOUS BLD VENIPUNCTURE: CPT

## 2025-07-07 PROCEDURE — 6370000000 HC RX 637 (ALT 250 FOR IP): Performed by: HOSPITALIST

## 2025-07-07 PROCEDURE — 85025 COMPLETE CBC W/AUTO DIFF WBC: CPT

## 2025-07-07 PROCEDURE — 94761 N-INVAS EAR/PLS OXIMETRY MLT: CPT

## 2025-07-07 PROCEDURE — 92526 ORAL FUNCTION THERAPY: CPT

## 2025-07-07 PROCEDURE — 92507 TX SP LANG VOICE COMM INDIV: CPT

## 2025-07-07 PROCEDURE — 97129 THER IVNTJ 1ST 15 MIN: CPT

## 2025-07-07 PROCEDURE — 2140000000 HC CCU INTERMEDIATE R&B

## 2025-07-07 PROCEDURE — 2500000003 HC RX 250 WO HCPCS: Performed by: HOSPITALIST

## 2025-07-07 PROCEDURE — 80048 BASIC METABOLIC PNL TOTAL CA: CPT

## 2025-07-07 PROCEDURE — 70551 MRI BRAIN STEM W/O DYE: CPT

## 2025-07-07 PROCEDURE — 83735 ASSAY OF MAGNESIUM: CPT

## 2025-07-07 PROCEDURE — 6370000000 HC RX 637 (ALT 250 FOR IP): Performed by: STUDENT IN AN ORGANIZED HEALTH CARE EDUCATION/TRAINING PROGRAM

## 2025-07-07 RX ORDER — LORAZEPAM 1 MG/1
1 TABLET ORAL
Status: COMPLETED | OUTPATIENT
Start: 2025-07-07 | End: 2025-07-07

## 2025-07-07 RX ORDER — SODIUM CHLORIDE 9 MG/ML
INJECTION, SOLUTION INTRAVENOUS CONTINUOUS
Status: ACTIVE | OUTPATIENT
Start: 2025-07-07 | End: 2025-07-07

## 2025-07-07 RX ADMIN — LAMOTRIGINE 25 MG: 25 TABLET ORAL at 23:22

## 2025-07-07 RX ADMIN — ATORVASTATIN CALCIUM 80 MG: 80 TABLET, FILM COATED ORAL at 23:22

## 2025-07-07 RX ADMIN — LISINOPRIL 5 MG: 5 TABLET ORAL at 08:35

## 2025-07-07 RX ADMIN — SERTRALINE 50 MG: 50 TABLET, FILM COATED ORAL at 08:35

## 2025-07-07 RX ADMIN — BACLOFEN 10 MG: 10 TABLET ORAL at 23:22

## 2025-07-07 RX ADMIN — SODIUM CHLORIDE, PRESERVATIVE FREE 10 ML: 5 INJECTION INTRAVENOUS at 08:47

## 2025-07-07 RX ADMIN — LORAZEPAM 1 MG: 1 TABLET ORAL at 12:59

## 2025-07-07 RX ADMIN — Medication 2000 UNITS: at 08:36

## 2025-07-07 RX ADMIN — CLOPIDOGREL BISULFATE 75 MG: 75 TABLET, FILM COATED ORAL at 08:36

## 2025-07-07 RX ADMIN — ENOXAPARIN SODIUM 40 MG: 100 INJECTION SUBCUTANEOUS at 08:36

## 2025-07-07 RX ADMIN — SODIUM CHLORIDE: 0.9 INJECTION, SOLUTION INTRAVENOUS at 08:46

## 2025-07-07 RX ADMIN — SODIUM CHLORIDE: 0.9 INJECTION, SOLUTION INTRAVENOUS at 20:09

## 2025-07-07 RX ADMIN — ASPIRIN 81 MG: 81 TABLET, CHEWABLE ORAL at 08:36

## 2025-07-07 RX ADMIN — PANTOPRAZOLE SODIUM 40 MG: 40 TABLET, DELAYED RELEASE ORAL at 08:36

## 2025-07-07 ASSESSMENT — PAIN SCALES - GENERAL
PAINLEVEL_OUTOF10: 0
PAINLEVEL_OUTOF10: 0

## 2025-07-07 NOTE — PROGRESS NOTES
NAME:  Darshana Grace  YOB: 1945  MEDICAL RECORD NUMBER:  [id number]    Shift Summary: patient started on IV fluids tolerating well,. Repeat MRI completed,      Family updated: Yes:  daughter updated,     Rhythm: Normal Sinus Rhythm     Most recent vitals:   Visit Vitals  BP (!) 149/93   Pulse 54   Temp 97.4 °F (36.3 °C) (Oral)   Resp 14   Ht 1.702 m (5' 7\")   Wt 68.4 kg (150 lb 12.7 oz)   SpO2 97%   BMI 23.62 kg/m²           No data found.    No data found.      Respiratory support needed (if any):  - RA    Admission weight Weight - Scale: 64.6 kg (142 lb 6.7 oz) (07/01/25 2118)    Today's weight    Wt Readings from Last 1 Encounters:   07/07/25 68.4 kg (150 lb 12.7 oz)        Gama need assessed each shift: N/A - no gama present  UOP >30ml/hr:   Last documented BM (in last 48 hrs):  No data found.             Restraints (in use currently or dc'd in last 12 hrs): No    Order current and documentation up to date? No    Lines/Drains reviewed @ bedside.  Peripheral IV 07/01/25 Left Antecubital (Active)   Number of days: 5         Drip rates at handoff:    sodium chloride 100 mL/hr at 07/07/25 1913    sodium chloride         Lab Data:   CBC:   Recent Labs     07/06/25  0906 07/07/25  0739   WBC 8.8 9.4   HGB 14.4 14.6   HCT 41.9 41.3   MCV 89.9 89.1    172     BMP:    Recent Labs     07/06/25  0906 07/07/25  0430   * 131*   K 3.8 3.4*   CO2 29 23   BUN 19 17   CREATININE 0.8 0.7     ABG: No results for input(s): \"PHART\", \"TAT5KQI\", \"PO2ART\" in the last 72 hours.    Any consults during the shift? No    Any signed and held orders to be released?  No        4 Eyes Skin Assessment       The patient is being assessed for  Shift Handoff    I agree that at least one RN has performed a thorough Head to Toe Skin Assessment on the patient. ALL assessment sites listed below have been assessed.      Areas assessed by both nurses: Head, Face, Ears, Shoulders, Back, Chest, Arms, Elbows, Hands,

## 2025-07-07 NOTE — PROGRESS NOTES
Physical Therapy  Attempt Note  Darshana GLEZ Grace  [id number]    The pt currently out of the room at MRI. Will attempt back tomorrow if the pt is available.    If pt. is D/C'd prior to next visit please refer back to last daily progress note for D/C status.  Electronically signed by Louise Edge, PT [id number] on 7/7/2025 at 1:41 PM

## 2025-07-07 NOTE — PLAN OF CARE
Problem: Chronic Conditions and Co-morbidities  Goal: Patient's chronic conditions and co-morbidity symptoms are monitored and maintained or improved  7/7/2025 0510 by Zoila Reed RN  Outcome: Progressing  7/6/2025 1743 by Eloisa Winter RN  Outcome: Progressing     Problem: Discharge Planning  Goal: Discharge to home or other facility with appropriate resources  7/7/2025 0510 by Zoila Reed RN  Outcome: Progressing  7/6/2025 1743 by Eloisa Winter RN  Outcome: Progressing     Problem: Pain  Goal: Verbalizes/displays adequate comfort level or baseline comfort level  7/7/2025 0510 by Zoila Reed RN  Outcome: Progressing  7/6/2025 1743 by Eloisa Winter RN  Outcome: Progressing     Problem: Skin/Tissue Integrity  Goal: Skin integrity remains intact  Description: 1.  Monitor for areas of redness and/or skin breakdown  2.  Assess vascular access sites hourly  3.  Every 4-6 hours minimum:  Change oxygen saturation probe site  4.  Every 4-6 hours:  If on nasal continuous positive airway pressure, respiratory therapy assess nares and determine need for appliance change or resting period  7/7/2025 0510 by Zoila Reed RN  Outcome: Progressing  7/6/2025 1743 by Eloisa Winter RN  Outcome: Progressing     Problem: ABCDS Injury Assessment  Goal: Absence of physical injury  7/7/2025 0510 by Zoila Reed RN  Outcome: Progressing  7/6/2025 1743 by Eloisa Winter RN  Outcome: Progressing     Problem: Safety - Adult  Goal: Free from fall injury  7/7/2025 0510 by Zoila Reed RN  Outcome: Progressing  7/6/2025 1743 by Eloisa Winter RN  Outcome: Progressing     Problem: Neurosensory - Adult  Goal: Achieves stable or improved neurological status  7/7/2025 0510 by Zoila Reed RN  Outcome: Progressing  7/6/2025 1743 by Eloisa Winter RN  Outcome: Progressing  Goal: Achieves maximal functionality and self care  7/7/2025 0510 by Zoila Reed RN  Outcome: Progressing  7/6/2025 1743 by Eloisa Winter  RN  Outcome: Progressing

## 2025-07-07 NOTE — PROGRESS NOTES
Facility/Department: [address] [facility]   DYSPHAGIA THERAPY and SPEECH / LANGUAGE / COGNITIVE-LINGUISTIC TREATMENT    Patient: aDrshana Grace   : 1945   MRN: [id number]      Treatment Date: 2025      Admitting Dx:   TIA (transient ischemic attack) [G45.9]  Stroke-like symptoms [R29.90]   has a past medical history of Arthritis, Bell's palsy, Cerebral artery occlusion with cerebral infarction (HCC), CKD (chronic kidney disease), Claustrophobia, Diabetes mellitus (HCC), Esophageal reflux, blood clots, Hyperlipidemia, Hypertension, Kidney stone, Nervous, Pulmonary embolism (HCC), and Stuttering.   has a past surgical history that includes Hysterectomy; Appendectomy; Cardiac catheterization; Cholecystectomy; Colonoscopy; Tonsillectomy; knee surgery (Right); blepharoplasty (Bilateral, 2021); Abdomen surgery; and Endoscopy, colon, diagnostic.  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 per \"family\"); seen recently during prior acute admission s/p stroke for speech/lang/cog  Dysphagia History including instrumental assessment: rec for easy chew/thin s/p prior stroke  GI history: h/o esophageal reflux  Prior level of function (communication, home/med/finance management, driving, etc) and living status: admitted from home alone following psych admission from acute care s/p stroke - patient reports home alone did not go well; prior to stroke, was independent for ADLs and IADLs, but reduced level of function/independence s/p stroke; was an active  prior to stroke, but not medically cleared to drive post-stroke; 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  Strategies:  Remain Upright 30-45 min , Upright as possible with all PO intake     Eating Assistance Recommendation: Supervision or touching assistance    Continued Treatment recommendations: Receptive/Expressive Language, Cognitive-Linguistic, Motor speech / Voice , Dysphagia  Frequency of treatment: 2-5 times/week    Continued Dysphagia Therapeutic Intervention: Bolus Control Exercise, Diet Tolerance Monitoring , Oral Motor Exercises , Patient/Family Education , Therapeutic Trials with SLP   Continued SLP Therapeutic Intervention: Cognitive-Linguistic, Cognitive-communication, Motor Speech    Anticipated Discharge Recommendations:  Recommend ongoing SLP for speech and dysphagia therapy upon discharge from hospital       GOALS / PLAN     GOALS:  SHORT TERM DYSPHAGIA GOALS  Pt will functionally tolerate recommended diet with no overt clinical s/s of aspiration continue as tolerated  Pt will advance to least restrictive diet as indicated  not met  SHORT TERM SPEECH/LANGUAGE/COGNITIVE GOALS  Patient will demonstrate improved ease of audibile phonation via functional voice exercises for graded utterances with mild cues with use of compensatory strategies PRN max cues  Patient will demonstrate functional   processing for graded yes/no questions, wh?, and/or commands with moderate cues dependent for multi-unit  Patient will demonstrate improved communicative effectiveness for daily needs including topic related conversational exchanges, word retrieval and thought organization for graded naming tasks and concept/event explanation with mild cues mod-max cues  Patient will demonstrate improved short-term recall with moderate cues dependent  Patient will demonstrate improved verbal problem solving across graded tasks with moderate cues unable to address    Above goals reviewed on 07/07/25 . All goals are ongoing at this time unless indicated above.    EDUCATION     Provided education regarding role of SLP, results of

## 2025-07-07 NOTE — PLAN OF CARE
Problem: Chronic Conditions and Co-morbidities  Goal: Patient's chronic conditions and co-morbidity symptoms are monitored and maintained or improved  7/7/2025 1134 by Summer Prakash RN  Outcome: Progressing  Flowsheets (Taken 7/7/2025 0834)  Care Plan - Patient's Chronic Conditions and Co-Morbidity Symptoms are Monitored and Maintained or Improved:   Monitor and assess patient's chronic conditions and comorbid symptoms for stability, deterioration, or improvement   Collaborate with multidisciplinary team to address chronic and comorbid conditions and prevent exacerbation or deterioration   Update acute care plan with appropriate goals if chronic or comorbid symptoms are exacerbated and prevent overall improvement and discharge  7/7/2025 0510 by Zoila Reed RN  Outcome: Progressing     Problem: Discharge Planning  Goal: Discharge to home or other facility with appropriate resources  7/7/2025 1134 by Summer Prakash RN  Outcome: Progressing  Flowsheets (Taken 7/7/2025 0834)  Discharge to home or other facility with appropriate resources:   Identify barriers to discharge with patient and caregiver   Arrange for needed discharge resources and transportation as appropriate  7/7/2025 0510 by Zoila Reed RN  Outcome: Progressing     Problem: Pain  Goal: Verbalizes/displays adequate comfort level or baseline comfort level  7/7/2025 1134 by Summer Prakash RN  Outcome: Progressing  7/7/2025 0510 by Zoila Reed RN  Outcome: Progressing     Problem: Skin/Tissue Integrity  Goal: Skin integrity remains intact  Description: 1.  Monitor for areas of redness and/or skin breakdown  2.  Assess vascular access sites hourly  3.  Every 4-6 hours minimum:  Change oxygen saturation probe site  4.  Every 4-6 hours:  If on nasal continuous positive airway pressure, respiratory therapy assess nares and determine need for appliance change or resting period  7/7/2025 1134 by Summer Prakash RN  Outcome:

## 2025-07-07 NOTE — PROGRESS NOTES
Occupational Therapy Attempt  Darshana S Lesly  7/7/2025  [id number]    Pt attempted, but is currently off the unit for MRI. Will cont to follow while hospitalized.     Electronically signed by YONY Bryson/L [id number] on 7/7/25 at 1:41 PM EDT

## 2025-07-07 NOTE — CARE COORDINATION
Rec'd call from her [facility] Agent, Annabelle Hoyos who wanted an update on SNF.  She reports last time she was in the hospital, she feels like [facility] dropped the ball as she needed therapy to begin but we sent her to the Inpt Psych Unit and she didn't have any therapy there.  She thought she needed SNF at that time.  Informed her that SNF is not able to take a patient who is at risk of suicide due to the fact that no one is watching the patient 24 hours of the day.  Therefore, she needed to go to Inpt Psych before getting there therapy.  She reports she was working on home therapy but had to return to the hospital before it could be set up.    Informed her that she will need more therapy evaluations before either SNF agency will offer to accept and then we will need to engage with insurance to ensure they will cover it.  She verbalized understanding.  Provided her with bedside RN 's number for medical update.    HERNANDEZ Salinas     Case Management   [phone]    7/7/2025  12:48 PM

## 2025-07-07 NOTE — CARE COORDINATION
Nini from [facility] called back.  Currently they do have beds but this referral is too soon.    They need to find out if she is skillable (needs more therapy notes updated), is she still having suicide ideations and who will be assisting them for DC when it is time.  Informed her that she does have a [facility] agent who is Annabelle Hoyos.  Pt's cognition is waxing and waning at this point.   Spoke with bedside RN, Summer to update.    Following for DC disposition.    HERNANDEZ Salinas     Case Management   [phone]    7/7/2025  11:00 AM

## 2025-07-07 NOTE — PROGRESS NOTES
V2.0    [facility] Progress Note      Name:  Darshana Grace /Age/Sex: 1945  (79 y.o. female)   MRN & CSN:  [id number] & [id number] Encounter Date/Time: 2025 8:48 AM EDT   Location:  J3H-0125/1308-01 PCP: Eugenia Lamb MD     Attending:Master Garcia DO       Hospital Day: 7  Brief HPI  Darshana Grace is a 79 y.o. female with pertinent PMHx of CVA, CKD, T2DM, HTN, HLD who presented with altered mental status and trouble speaking concerning for new acute CVA.  Assessment & Plan     Acute CVA  - CT head nonacute x 2  - CTA head and neck with multiple areas of stenosis, no new or worsening stenosis noted on repeat CTA yesterday  - MRI brain with thin cuts to evaluate brainstem due to new CARLITA remains pending, going for MRI today  - Continue DAPT with aspirin 81 mg daily and Plavix 75 mg daily  - Continue atorvastatin 80 mg nightly  - Neuroendovascular surgery consulted, though do not suspect that there would be any intervention recommended, may need outpatient referral  - Neurology following    Altered mental status, improved  - Most likely waxing waning periods of delirium which make accurate assessment neurostatus more challenging    Urinary tract infection, resolved  - Completed antibiotic course with IV Rocephin    Essential hypertension  - Continue lisinopril     Depression  - Continue Lamictal and sertraline      Diet ADULT DIET; Easy to Chew; Low Fat/Low Chol/High Fiber/AZALEA  ADULT ORAL NUTRITION SUPPLEMENT; Breakfast, Lunch, Dinner; Standard High Calorie/High Protein Oral Supplement   DVT Prophylaxis [x] Lovenox, []  Heparin, [] SCDs, [] Ambulation,  [] Eliquis, [] Xarelto  [] Coumadin   Code Status DNR-CCA   Disposition From: Home  Expected Disposition: SNF  Estimated Date of Discharge: - pending MRI           Subjective:     Chief Complaint: Altered mental status    Patient seen and examined today lying in bed  Says she feels okay today, no new complaints or concerns  Mentation  Date/Time    LABA1C 5.4 07/03/2025 06:24 AM     TSH:   Lab Results   Component Value Date/Time    TSH 1.39 06/13/2025 08:23 AM     Troponin: No results found for: \"TROPONINT\"  Lactic Acid: No results for input(s): \"LACTA\" in the last 72 hours.  BNP: No results for input(s): \"PROBNP\" in the last 72 hours.  UA:  Lab Results   Component Value Date/Time    NITRU POSITIVE 07/01/2025 08:51 PM    COLORU Yellow 07/01/2025 08:51 PM    PHUR 5.5 07/01/2025 08:51 PM    WBCUA 3 07/01/2025 08:51 PM    RBCUA 0 07/01/2025 08:51 PM    MUCUS Present 07/01/2025 08:51 PM    BACTERIA 4+ 07/01/2025 08:51 PM    CLARITYU CLOUDY 07/01/2025 08:51 PM    LEUKOCYTESUR TRACE 07/01/2025 08:51 PM    UROBILINOGEN 0.2 07/01/2025 08:51 PM    BILIRUBINUR Negative 07/01/2025 08:51 PM    BLOODU Negative 07/01/2025 08:51 PM    GLUCOSEU Negative 07/01/2025 08:51 PM    KETUA 15 07/01/2025 08:51 PM     Urine Cultures:   Lab Results   Component Value Date/Time    LABURIN >100,000 CFU/ml 06/14/2025 04:30 PM    LABURIN 75,000 CFU/ml 06/14/2025 04:30 PM     Blood Cultures: No results found for: \"BC\"  No results found for: \"BLOODCULT2\"  Organism:   Lab Results   Component Value Date/Time    ORG Escherichia coli 06/14/2025 04:30 PM    ORG Proteus mirabilis 06/14/2025 04:30 PM         Electronically signed by Master Garcia DO on 7/7/2025 at 7:23 AM

## 2025-07-07 NOTE — PROGRESS NOTES
NAME:  Darshana Grace  YOB: 1945  MEDICAL RECORD NUMBER:  [id number]    Shift Summary: Pt had an uneventful night; appeared to have slept well. Pt started off this shift in the chair and was able to transfer to the bed with the stedy X1. No changes in mentation/Neuro.   POC to continue.     Family updated: No    Rhythm: Normal Sinus Rhythm     Most recent vitals:   Visit Vitals  /65   Pulse 50   Temp 98 °F (36.7 °C) (Axillary)   Resp 14   Ht 1.702 m (5' 7\")   Wt 68.4 kg (150 lb 12.7 oz)   SpO2 96%   BMI 23.62 kg/m²           No data found.    No data found.      Respiratory support needed (if any):  - RA    Admission weight Weight - Scale: 64.6 kg (142 lb 6.7 oz) (07/01/25 2118)    Today's weight    Wt Readings from Last 1 Encounters:   07/07/25 68.4 kg (150 lb 12.7 oz)        Gama need assessed each shift: N/A - no gama present  UOP >30ml/hr: YES  Last documented BM (in last 48 hrs):  Patient Vitals for the past 48 hrs:   Last BM (including prior to admit) Stool Occurrence   07/05/25 0730 07/05/25 --   07/05/25 1100 07/05/25 1   07/05/25 1700 07/05/25 1                Restraints (in use currently or dc'd in last 12 hrs): No    Order current and documentation up to date? No    Lines/Drains reviewed @ bedside.  Peripheral IV 07/01/25 Left Antecubital (Active)   Number of days: 5         Drip rates at handoff:    sodium chloride         Lab Data:   CBC:   Recent Labs     07/05/25  0649 07/06/25  0906   WBC 9.3 8.8   HGB 14.5 14.4   HCT 42.8 41.9   MCV 90.7 89.9    169     BMP:    Recent Labs     07/06/25  0906 07/07/25  0430   * 131*   K 3.8 3.4*   CO2 29 23   BUN 19 17   CREATININE 0.8 0.7     ABG: No results for input(s): \"PHART\", \"ZPB0NQP\", \"PO2ART\" in the last 72 hours.    Any consults during the shift? No    Any signed and held orders to be released?  No        4 Eyes Skin Assessment       The patient is being assessed for  Shift Handoff    I agree that at least one RN

## 2025-07-08 PROCEDURE — 6370000000 HC RX 637 (ALT 250 FOR IP): Performed by: HOSPITALIST

## 2025-07-08 PROCEDURE — 94760 N-INVAS EAR/PLS OXIMETRY 1: CPT

## 2025-07-08 PROCEDURE — 92507 TX SP LANG VOICE COMM INDIV: CPT

## 2025-07-08 PROCEDURE — 97129 THER IVNTJ 1ST 15 MIN: CPT

## 2025-07-08 PROCEDURE — 6360000002 HC RX W HCPCS: Performed by: HOSPITALIST

## 2025-07-08 PROCEDURE — 97530 THERAPEUTIC ACTIVITIES: CPT

## 2025-07-08 PROCEDURE — 2500000003 HC RX 250 WO HCPCS: Performed by: STUDENT IN AN ORGANIZED HEALTH CARE EDUCATION/TRAINING PROGRAM

## 2025-07-08 PROCEDURE — 2060000000 HC ICU INTERMEDIATE R&B

## 2025-07-08 PROCEDURE — 6370000000 HC RX 637 (ALT 250 FOR IP): Performed by: STUDENT IN AN ORGANIZED HEALTH CARE EDUCATION/TRAINING PROGRAM

## 2025-07-08 RX ORDER — DICYCLOMINE HYDROCHLORIDE 10 MG/1
10 CAPSULE ORAL
Status: DISCONTINUED | OUTPATIENT
Start: 2025-07-09 | End: 2025-07-11 | Stop reason: HOSPADM

## 2025-07-08 RX ORDER — DICYCLOMINE HYDROCHLORIDE 10 MG/1
10 CAPSULE ORAL
Status: DISCONTINUED | OUTPATIENT
Start: 2025-07-08 | End: 2025-07-08

## 2025-07-08 RX ADMIN — PANTOPRAZOLE SODIUM 40 MG: 40 TABLET, DELAYED RELEASE ORAL at 08:36

## 2025-07-08 RX ADMIN — DICYCLOMINE HYDROCHLORIDE 10 MG: 10 CAPSULE ORAL at 12:18

## 2025-07-08 RX ADMIN — CLOPIDOGREL BISULFATE 75 MG: 75 TABLET, FILM COATED ORAL at 08:36

## 2025-07-08 RX ADMIN — ATORVASTATIN CALCIUM 80 MG: 80 TABLET, FILM COATED ORAL at 21:55

## 2025-07-08 RX ADMIN — ENOXAPARIN SODIUM 40 MG: 100 INJECTION SUBCUTANEOUS at 08:35

## 2025-07-08 RX ADMIN — SODIUM CHLORIDE, PRESERVATIVE FREE 10 ML: 5 INJECTION INTRAVENOUS at 21:56

## 2025-07-08 RX ADMIN — SERTRALINE 50 MG: 50 TABLET, FILM COATED ORAL at 08:36

## 2025-07-08 RX ADMIN — BACLOFEN 10 MG: 10 TABLET ORAL at 21:55

## 2025-07-08 RX ADMIN — ASPIRIN 81 MG: 81 TABLET, CHEWABLE ORAL at 08:35

## 2025-07-08 RX ADMIN — LISINOPRIL 5 MG: 5 TABLET ORAL at 08:36

## 2025-07-08 RX ADMIN — LAMOTRIGINE 25 MG: 25 TABLET ORAL at 21:55

## 2025-07-08 RX ADMIN — Medication 2000 UNITS: at 08:36

## 2025-07-08 RX ADMIN — ONDANSETRON 4 MG: 2 INJECTION, SOLUTION INTRAMUSCULAR; INTRAVENOUS at 14:54

## 2025-07-08 ASSESSMENT — PAIN SCALES - GENERAL
PAINLEVEL_OUTOF10: 0
PAINLEVEL_OUTOF10: 5
PAINLEVEL_OUTOF10: 5
PAINLEVEL_OUTOF10: 0
PAINLEVEL_OUTOF10: 0

## 2025-07-08 ASSESSMENT — PAIN DESCRIPTION - LOCATION
LOCATION: ABDOMEN
LOCATION: ABDOMEN

## 2025-07-08 NOTE — CARE COORDINATION
Chart Reviewed.  Pt participated in therapy evals today and they are recommending SNF for her dc.  Spoke with bedside RN, Summer, who states she is talking but Is difficult to understand due to stuttering.       Updated Two Agency Reps for SNF:  Jeannette at [facility]:  [phone]  Nini at [facility]:  [phone].    Both agencies are entertaining, however, they needed updated therapy notes and need to know her mental health status.  Pt recently dc'd to Inpt Psych and then had been sent home.    HERNANDEZ Salinas     Case Management   [phone]    7/8/2025  9:24 AM

## 2025-07-08 NOTE — PROGRESS NOTES
does not endorse  Family: not present  Staff: reduced intake    Pain:  Did not state    Dentition: Upper Denture, Missing Lower Teeth    Vision: Wears Glasses, Significant visual imps s/p 6/2025 stroke  Hearing: Adequate for assessment/tx needs    Patient Response: Patient pleasant and cooperative; repetitive questions, but emotionally stable this date and remains motivated for tx    TREATMENT SUMMARY / IMPRESSIONS     Dysphagia Impressions/Dysphagia Diagnosis: Oropharyngeal Dysphagia   oropharyngeal dysphagia features characterized by suspected reduced mastication and reduced lingual manipulation with potential for delayed swallow initiation and decreased laryngeal elevation   Respiratory Status: Room Air   Cognitive/behavioral/communication: oriented to self, alert, self-limiting, requires encouragement for participation, verbally responsive, follows one step commands  Patient Positioning: Fully upright, In bed  Dysphagia Tx:   PO Trials: Thin liquids  via straw, no solids  All solids again declined; patient repeatedly politely denies desire to take solids  No apparent overt clinical signs/symptoms   Pt reporting s/s of concern for Esophageal problems: None reported  Dysphagia ex: Not completed   Training in compensatory strategies: dependent  Pt response to ex/training: Needs continued reinforcement/education  Recommendations:   Continue current diet pending ongoing assessment; consider downgrade to minced vs puree if staff concern for reduced oral tolerance.  Impressions of Direct Dysphagia tx: continue as tolerated     Speech Diagnosis Impressions: Cognitive-Linguistic Deficits , Speech Language Deficits   Skilled Treatment this date:  Motor speech: reduced articulatory precision <50% intelligibility at word level - concern for dyskinetic mandibular movement vs tremor impacting intelligibility; spastic vocal quality with reduced breath support for phonation - unable to shape frontal focus or breath support ex  recommendations: Receptive/Expressive Language, Cognitive-Linguistic, Motor speech / Voice , Dysphagia  Frequency of treatment: 2-5 times/week    Continued Dysphagia Therapeutic Intervention: Bolus Control Exercise, Diet Tolerance Monitoring , Oral Motor Exercises , Patient/Family Education , Therapeutic Trials with SLP   Continued SLP Therapeutic Intervention: Cognitive-Linguistic, Cognitive-communication, Motor Speech    Anticipated Discharge Recommendations:  Recommend ongoing SLP for speech and dysphagia therapy upon discharge from hospital       GOALS / PLAN     GOALS:  SHORT TERM DYSPHAGIA GOALS  Pt will functionally tolerate recommended diet with no overt clinical s/s of aspiration continue as tolerated  Pt will advance to least restrictive diet as indicated  not met  SHORT TERM SPEECH/LANGUAGE/COGNITIVE GOALS  Patient will demonstrate improved ease of audibile phonation via functional voice exercises for graded utterances with mild cues with use of compensatory strategies PRN max cues  Patient will demonstrate functional   processing for graded yes/no questions, wh?, and/or commands with moderate cues dependent for multi-unit  Patient will demonstrate improved communicative effectiveness for daily needs including topic related conversational exchanges, word retrieval and thought organization for graded naming tasks and concept/event explanation with mild cues mod cues  Patient will demonstrate improved short-term recall with moderate cues dependent  Patient will demonstrate improved verbal problem solving across graded tasks with moderate cues unable to address    Above goals reviewed on 07/08/25 . All goals are ongoing at this time unless indicated above.    EDUCATION     Provided education regarding role of SLP, results of assessment, recommendations and general speech pathology plan of care.   Patient Response: Needs reinforcement  Family education: Family not present/unavailable  Staff education:

## 2025-07-08 NOTE — CARE COORDINATION
Rec'd phone call from her friend, Annabelle Moulton [phone] who wanted an update on DC planning.    Informed her she had PT/OT evals today and they continue to recommend SNF.  Informed her pt bedside RN that she is stuttering, confused and not able to be fully understood due to the stuttering.    She expresses frustration that she needs therapies but the SNF s haven't accepted her as yet and she is fearful that she won't get the therapy she needs.  Reiterated that SNF agencies cannot accept a person who has active suicidal ideations and they are holding off on accepting the referral until we know her mentation due to the fact she was recently released from inpt psych.    She reports she didn't appreciate that we sent her to the Inpt Psych Unit on her last discharge from hospital and then they sent her home without any services and now she is right back into the hospital.    She also reports she is leaving for vacation on Sunday 7- and will be reachable via phone but states no one will be here to assist her in transition to new place.      HERNANDEZ Salinas     Case Management   [phone]    7/8/2025  11:34 AM

## 2025-07-08 NOTE — DISCHARGE INSTR - COC
Continuity of Care Form    Patient Name: Darshana Grace   :  1945  MRN:  [id number]    Admit date:  2025  Discharge date:  25    Code Status Order: DNR-CCA   Advance Directives:     Admitting Physician:  Valentina Borden Jr., MD  PCP: Eugenia Lamb MD    Discharging Nurse: phoenix clayton  Discharging Hospital Unit/Room#: W1J-8789/1308-01  Discharging Unit Phone Number: [phone]    Emergency Contact:   Extended Emergency Contact Information  Primary Emergency Contact: Annabelle Payne  Mobile Phone: [phone]  Relation: Friend  Secondary Emergency Contact: Maddy Fallon  Mobile Phone: [phone]  Relation: Friend    Past Surgical History:  Past Surgical History:   Procedure Laterality Date    ABDOMEN SURGERY      APPENDECTOMY      BLEPHAROPLASTY Bilateral 2021    BILATERAL UPPER LID BLEPHAROPLASTY performed by Flakita Hull MD at [facility]    CARDIAC CATHETERIZATION      CHOLECYSTECTOMY      COLONOSCOPY      ENDOSCOPY, COLON, DIAGNOSTIC      HYSTERECTOMY (CERVIX STATUS UNKNOWN)      KNEE SURGERY Right     TONSILLECTOMY         Immunization History:   Immunization History   Administered Date(s) Administered    COVID-19, MODERNA BLUE border, Primary or Immunocompromised, (age 12y+), IM, 100 mcg/0.5mL 2021, 2021       Active Problems:  Patient Active Problem List   Diagnosis Code    Stroke-like symptoms R29.90       Isolation/Infection:   Isolation            No Isolation          Patient Infection Status    None to display         Nurse Assessment:  Last Vital Signs: /69   Pulse 95   Temp 97.4 °F (36.3 °C) (Oral)   Resp 17   Ht 1.702 m (5' 7\")   Wt 70 kg (154 lb 5.2 oz)   SpO2 95%   BMI 24.17 kg/m²     Last documented pain score (0-10 scale): Pain Level: 5  Last Weight:   Wt Readings from Last 1 Encounters:   25 70 kg (154 lb 5.2 oz)     Mental Status:  disoriented and alert    IV Access:  - None    Nursing Mobility/ADLs:  Walking

## 2025-07-08 NOTE — PROGRESS NOTES
other micro tests reviewed            Subjective:     Chief Complaint:     Darshana Grace is a 79 y.o. female who presents with   AAO x 3  No acute overnight events  Denies chest pain or sob  Denies fever or chills or vomiting or abdominal pain         Review of Systems:      Pertinent positives and negatives discussed in HPI    Objective:     Intake/Output Summary (Last 24 hours) at 7/8/2025 1302  Last data filed at 7/8/2025 0645  Gross per 24 hour   Intake 1031.41 ml   Output 1250 ml   Net -218.59 ml      Vitals:   Vitals:    07/08/25 0900 07/08/25 1000 07/08/25 1100 07/08/25 1218   BP:       Pulse: (!) 107 81 90 95   Resp: 19 16 20 17   Temp:       TempSrc:       SpO2:    95%   Weight:       Height:             Physical Exam:      General: NAD  Eyes: EOMI  ENT: neck supple  Cardiovascular: Regular rate.  Respiratory: Clear to auscultation  Gastrointestinal: Soft, non tender  Genitourinary: no suprapubic tenderness  Musculoskeletal: No edema  Skin: warm, dry  Neuro: Alert.    Dysarthria +    Facial asymmetry +    R UE and RLE weakness +  Psych: Mood appropriate.         Medications:   Medications:    dicyclomine  10 mg Oral TID AC    aspirin  81 mg Oral Daily    atorvastatin  80 mg Oral Nightly    baclofen  10 mg Oral Nightly    Vitamin D  2,000 Units Oral Daily    lamoTRIgine  25 mg Oral Nightly    lisinopril  5 mg Oral Daily    pantoprazole  40 mg Oral QAM AC    sertraline  50 mg Oral Daily    sodium chloride flush  5-40 mL IntraVENous 2 times per day    enoxaparin  40 mg SubCUTAneous Daily    clopidogrel  75 mg Oral Daily      Infusions:    sodium chloride       PRN Meds: sodium chloride flush, 5-40 mL, PRN  sodium chloride, , PRN  ondansetron, 4 mg, Q8H PRN   Or  ondansetron, 4 mg, Q6H PRN  polyethylene glycol, 17 g, Daily PRN        Labs and Imaging   MRI BRAIN WO CONTRAST  Result Date: 7/7/2025  EXAMINATION: MRI OF THE BRAIN WITHOUT CONTRAST,  7/7/2025 1:19 pm TECHNIQUE: Multiplanar multisequence MRI of  the brain was performed without the administration of intravenous contrast. COMPARISON: 07/03/2025 HISTORY: ORDERING SYSTEM PROVIDED HISTORY: thin cuts to brainstem. CARLITA. FLuctuating neurological exam. Initially with Right sided weakness. now with left. TECHNOLOGIST PROVIDED HISTORY: Reason for Exam:  Thin cuts to brainstem. CARLITA. FLuctuating neurological exam. Initially with Right sided weakness. now with left. What is the sedation requirement?  None Reason for Exam: thin cuts to brainstem. CARLITA. Fluctuating neurological exam. Initially with Right sided weakness. now with left. FINDINGS: INTRACRANIAL STRUCTURES/VENTRICLES: Subacute left posterior cerebral artery territory infarcts are unchanged in extent.  No new area of infarction is identified.  There is no acute hemorrhage, herniation, or hydrocephalus. Moderate partially confluent white matter T2 hyperintense foci are nonspecific but commonly attributed to chronic microvascular ischemia. ORBITS: The visualized portion of the orbits demonstrate no acute abnormality. SINUSES: The visualized paranasal sinuses and mastoid air cells demonstrate no acute abnormality. BONES/SOFT TISSUES: The bone marrow signal intensity appears normal. The soft tissues demonstrate no acute abnormality.     Unchanged subacute left posterior cerebral artery territory infarcts.     CT HEAD WO CONTRAST  Result Date: 7/3/2025  EXAM: CT HEAD WITHOUT CONTRAST 07/03/2025 05:13:37 PM TECHNIQUE: CT of the head was performed without the administration of intravenous contrast. Automated exposure control, iterative reconstruction, and/or weight based adjustment of the mA/kV was utilized to reduce the radiation dose to as low as reasonably achievable. COMPARISON: MRI of the brain dated 07/03/2025. CT scan of the head dated 07/01/2025. CLINICAL HISTORY: Recent acute stroke. FINDINGS: BRAIN AND VENTRICLES: Generalized atrophy and senescent white matter changes are again demonstrated. Decreased

## 2025-07-08 NOTE — PROGRESS NOTES
Neurology    Brain MRI unchanged.   No new recommendations.   Refer to progress note from 7/6.   Call w/ questions.  Thank you.     Chance Edgar NP  [facility]

## 2025-07-08 NOTE — PROGRESS NOTES
NAME:  Darshana Grace  YOB: 1945  MEDICAL RECORD NUMBER:  [id number]    Shift Summary: Patient with uneventful shift. VSS. NIH score remains 16. Speech unchanged.     Family updated: Yes:  sister    Rhythm: Normal Sinus Rhythm     Most recent vitals:   Visit Vitals  /69   Pulse 55   Temp 98.2 °F (36.8 °C) (Axillary)   Resp 16   Ht 1.702 m (5' 7\")   Wt 68.4 kg (150 lb 12.7 oz)   SpO2 95%   BMI 23.62 kg/m²           No data found.    No data found.      Respiratory support needed (if any):  - RA    Admission weight Weight - Scale: 64.6 kg (142 lb 6.7 oz) (07/01/25 2118)    Today's weight    Wt Readings from Last 1 Encounters:   07/07/25 68.4 kg (150 lb 12.7 oz)        Gama need assessed each shift: N/A - no gama present  UOP >30ml/hr: YES  Last documented BM (in last 48 hrs):  No data found.             Restraints (in use currently or dc'd in last 12 hrs): No    Order current and documentation up to date? No    Lines/Drains reviewed @ bedside.  Peripheral IV 07/01/25 Left Antecubital (Active)   Number of days: 6         Drip rates at handoff:    sodium chloride         Lab Data:   CBC:   Recent Labs     07/06/25  0906 07/07/25  0739   WBC 8.8 9.4   HGB 14.4 14.6   HCT 41.9 41.3   MCV 89.9 89.1    172     BMP:    Recent Labs     07/06/25  0906 07/07/25  0430   * 131*   K 3.8 3.4*   CO2 29 23   BUN 19 17   CREATININE 0.8 0.7     ABG: No results for input(s): \"PHART\", \"KVF9BLC\", \"PO2ART\" in the last 72 hours.    Any consults during the shift? No    Any signed and held orders to be released?  No        4 Eyes Skin Assessment       The patient is being assessed for  Shift Handoff    I agree that at least one RN has performed a thorough Head to Toe Skin Assessment on the patient. ALL assessment sites listed below have been assessed.      Areas assessed by both nurses: Head, Face, Ears, Shoulders, Back, Chest, Arms, Elbows, Hands, Sacrum. Buttock, Coccyx, Ischium, Legs. Feet and  Heels, and Under Medical Devices         Does the Patient have a Wound? No noted wound(s)    Wound Care Orders initiated or active by RN: No       Alhaji Prevention initiated or active: Yes    Pressure Injury (Stage 3,4, Unstageable, DTI, NWPT, and Complex wounds): No  If present, place \"IP Wound Care/Ostomy Nurse Eval and Treat\" in : No    Ostomies present: No  If new Ostomy, place \"IP Wound Care/Ostomy Nurse Eval and Treat\" in : No     Nurse 1 eSignature: Electronically signed by ARA GEORGE RN on 7/8/25 at 6:25 AM EDT    **SHARE this note so that the co-signing nurse can place an eSignature**    Nurse 2 eSignature: Electronically signed by Summer Prakash RN on 7/8/25 at 11:59 AM EDT

## 2025-07-08 NOTE — PROGRESS NOTES
4 Eyes Skin Assessment     NAME:  Darshana Grace  YOB: 1945  MEDICAL RECORD NUMBER:  [id number]    The patient is being assessed for  Transfer to New Unit    I agree that at least one RN has performed a thorough Head to Toe Skin Assessment on the patient. ALL assessment sites listed below have been assessed.      Areas assessed by both nurses:    Head, Face, Ears, Shoulders, Back, Chest, Arms, Elbows, Hands, Sacrum. Buttock, Coccyx, Ischium, Legs. Feet and Heels, and Under Medical Devices         Does the Patient have a Wound? No noted wound(s)    -Blanchable redness to coccyx, denuded [place] area d/t incontinence (zinc paste applied), bilat heels bony prominence red/blanchable        Alhaji Prevention initiated by RN: Yes  Wound Care Orders initiated by RN: No    Pressure Injury (Stage 1,2,3,4, Unstageable, DTI, NWPT, and Complex wounds) if present, place Wound referral order by RN under : No    New Ostomies, if present place, Ostomy referral order under : No     Nurse 1 eSignature: Electronically signed by Bridget Barrientos RN on 7/8/25 at 5:03 PM EDT    **SHARE this note so that the co-signing nurse can place an eSignature**    Nurse 2 eSignature: Electronically signed by Fredy Hickey RN on 7/8/25 at 5:37 PM EDT

## 2025-07-08 NOTE — PROGRESS NOTES
Physical Therapy    [facility] - Physical Therapy   Phone: [phone]    Physical Therapy  Facility/Department: [address] CVICU    [] Initial Evaluation            [x] Daily Treatment Note         [] Discharge Summary      Patient: Darshana Grace   : 1945   MRN: [id number]   Date of Service:  2025  Staff Mobility Recommendation: Stedy x 2  AM-PAC score:   Discharge Recommendations: SNF   Darshana Grace scored a  on the AM-PAC short mobility form. Current research shows that an AM-PAC score of 17 or less is typically not associated with a discharge to the patient's home setting. Based on the patient's AM-PAC score and their current functional mobility deficits, it is recommended that the patient have 3-5 sessions per week of Physical Therapy at d/c to increase the patient's independence.  Please see assessment section for further patient specific details.    If patient discharges prior to next session this note will serve as a discharge summary.  Please see below for the latest assessment towards goals.      Admitting Diagnosis: Stroke-like symptoms  Ordering Physician: Dr Borden  Current Admission Summary: 71 y/o female admit 2025 with Slurred Speech, UTI, Elevated Troponin; concern for new acute CVA.  Recent hospital admit -2025 with Acute CVA L PCA territory; d/c to In-Pt Psych due to severe depressing and endorsing active suicidal Ideation (initial d/c plan intended to SNF setting).  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     Past Medical History:  has a past medical history of Arthritis, Bell's palsy, Cerebral artery occlusion with cerebral infarction (HCC), CKD (chronic kidney disease), Claustrophobia, Diabetes mellitus (HCC), Esophageal reflux, Hx of blood clots, Hyperlipidemia, Hypertension, Kidney stone, Nervous, Pulmonary embolism (HCC), and Stuttering.  Past Surgical History:  has a past surgical history that includes Hysterectomy;  indicated above.      Therapy Session Time      Individual Group Co-treatment   Time In     0745   Time Out     0810   Minutes     25       Timed Code Treatment Minutes:  25 Minutes  Total Treatment Minutes:  25 minutes    Minutes per charge:  Therapeutic activity: 25 minutes      Electronically signed by Louise Edge, PT [id number] on 7/8/2025 at 8:20 AM

## 2025-07-08 NOTE — PROGRESS NOTES
Occupational Therapy        [facility] - Occupational Therapy   Phone: [phone]    Occupational Therapy  Facility/Department: [address] CVICU      [] Initial Evaluation            [x] Daily Treatment Note         [] Discharge Summary      Patient: Darshana Grace   : 1945   MRN: [id number]   Date of Service:  2025    Staff Mobility Recommendation: Stedy x2    AM-PAC Score:   Discharge Recommendations: SNF  Darshana Grace scored a  on the AM-PAC ADL Inpatient form. Current research shows that an AM-PAC score of 17 or less is typically not associated with a discharge to the patient's home setting. Based on the patient's AM-PAC score and their current ADL deficits, it is recommended that the patient have 3-5 sessions per week of Occupational Therapy at d/c to increase the patient's independence.  Please see assessment section for further patient specific details.    If patient discharges prior to next session this note will serve as a discharge summary.  Please see below for the latest assessment towards goals.      Admitting Diagnosis:  Stroke-like symptoms  Ordering Physician: Master Garcia DO   Current Admission Summary: Per H&P: \"79f recently admitted with CVA with resultant R facial, upper/lower ext weakness and dysarthria, presents to the ER due to lethargy and \"not speaking\" according to family. \" CT head showed: 1. No acute intracranial abnormality.   2.  Expected evolution of previous left PCA territory infarct. MRI pending       Past Medical History:  has a past medical history of Arthritis, Bell's palsy, Cerebral artery occlusion with cerebral infarction (HCC), CKD (chronic kidney disease), Claustrophobia, Diabetes mellitus (HCC), Esophageal reflux, Hx of blood clots, Hyperlipidemia, Hypertension, Kidney stone, Nervous, Pulmonary embolism (HCC), and Stuttering.  Past Surgical History:  has a past surgical history that includes Hysterectomy; Appendectomy; Cardiac  Cane, Walker - Rolling, Wheelchair - Manual  Has the patient had two or more falls in the past year or any fall with injury in the past year?: Yes  Prior Level of Assist for ADLs: Independent  Prior Level of Assist for Homemaking: Independent  Prior Level of Assist for Ambulation: Independent household ambulator, with or without device (with walker)  Prior Level of Assist for Transfers: Independent  Active : Yes  Mode of Transportation: Car  Occupation: Retired  Additional Comments: Pt admitted to [facility] 6/9-6/18 with CVA, pt made comments about wanting to kill herself by stopping medications. D/C to  ps 6/18- 6/24 and returned home.  Social information obtained from initial admit to acute care 6/2025.    Available Assistance at Discharge: unknown    Examination   Vision:   Vision: Impaired (unable to track waqas)  Vision Exceptions: Wears glasses for reading  Hearing:   Hearing: Within functional limits       Observation:   General Observation:  pleasant; tends to keep L eye closed, however when open observes deviating laterally; slight facial droop (chronic)  Posture:   Fair  Sensation:   denies numbness and tingling  Coordination Testing:   Finger/Thumb Opposition: Impaired on Left    ROM:   BUE decreased, but somewhat functional  Strength:   (L) Hand: +3      (R) Hand: +3    Therapist Clinical Decision Making (Complexity): medium complexity         Activities of Daily Living  Basic Activities of Daily Living  Toileting Comments: Rita Briefs dry, pt denied the need to void  General Comments: She declined all ADLs this date. Pt is anticipated to require up to Max-DEP for full ADLs based on her strength, balance, cognition, and visual deficits  Instrumental Activities of Daily Living  No IADL completed on this date.    Functional Mobility  Bed Mobility:  Supine to Sit: moderate assistance, use of bed rail, head of bed elevated  Comments: increased time to complete, cues throughout, pt ended session

## 2025-07-08 NOTE — PLAN OF CARE
Problem: Chronic Conditions and Co-morbidities  Goal: Patient's chronic conditions and co-morbidity symptoms are monitored and maintained or improved  7/8/2025 1130 by Summer Prakash RN  Outcome: Progressing  Flowsheets (Taken 7/8/2025 0800)  Care Plan - Patient's Chronic Conditions and Co-Morbidity Symptoms are Monitored and Maintained or Improved:   Monitor and assess patient's chronic conditions and comorbid symptoms for stability, deterioration, or improvement   Collaborate with multidisciplinary team to address chronic and comorbid conditions and prevent exacerbation or deterioration   Update acute care plan with appropriate goals if chronic or comorbid symptoms are exacerbated and prevent overall improvement and discharge  7/8/2025 0602 by Suyapa Wise RN  Outcome: Progressing  Flowsheets (Taken 7/7/2025 1600 by Summer Prakash RN)  Care Plan - Patient's Chronic Conditions and Co-Morbidity Symptoms are Monitored and Maintained or Improved:   Monitor and assess patient's chronic conditions and comorbid symptoms for stability, deterioration, or improvement   Collaborate with multidisciplinary team to address chronic and comorbid conditions and prevent exacerbation or deterioration   Update acute care plan with appropriate goals if chronic or comorbid symptoms are exacerbated and prevent overall improvement and discharge     Problem: Discharge Planning  Goal: Discharge to home or other facility with appropriate resources  7/8/2025 1130 by Summer Prakash RN  Outcome: Progressing  Flowsheets (Taken 7/8/2025 0800)  Discharge to home or other facility with appropriate resources:   Identify barriers to discharge with patient and caregiver   Arrange for needed discharge resources and transportation as appropriate  7/8/2025 0602 by Suyapa Wise RN  Outcome: Progressing  Flowsheets (Taken 7/7/2025 1600 by Summer Prakash RN)  Discharge to home or other facility with appropriate resources:   Identify

## 2025-07-08 NOTE — CARE COORDINATION
Called both SNF agencies to update them regarding new PT/OT/SP notes.  Sp indicates pt is motivated for therapies.  HERNANDEZ Salinas     Case Management   [phone]    7/8/2025  2:58 PM    Will need full precert when accepted by a facility.  HERNANDEZ Salinas     Case Management   [phone]    7/8/2025  2:58 PM

## 2025-07-08 NOTE — PLAN OF CARE
Problem: Chronic Conditions and Co-morbidities  Goal: Patient's chronic conditions and co-morbidity symptoms are monitored and maintained or improved  Outcome: Progressing  Flowsheets (Taken 7/7/2025 1600 by Summer Prakash, RN)  Care Plan - Patient's Chronic Conditions and Co-Morbidity Symptoms are Monitored and Maintained or Improved:   Monitor and assess patient's chronic conditions and comorbid symptoms for stability, deterioration, or improvement   Collaborate with multidisciplinary team to address chronic and comorbid conditions and prevent exacerbation or deterioration   Update acute care plan with appropriate goals if chronic or comorbid symptoms are exacerbated and prevent overall improvement and discharge     Problem: Discharge Planning  Goal: Discharge to home or other facility with appropriate resources  Outcome: Progressing  Flowsheets (Taken 7/7/2025 1600 by Summer Prakash, RN)  Discharge to home or other facility with appropriate resources:   Identify barriers to discharge with patient and caregiver   Arrange for needed discharge resources and transportation as appropriate     Problem: Pain  Goal: Verbalizes/displays adequate comfort level or baseline comfort level  Outcome: Progressing  Flowsheets (Taken 7/5/2025 0916 by Jean Ramirez RN)  Verbalizes/displays adequate comfort level or baseline comfort level:   Encourage patient to monitor pain and request assistance   Assess pain using appropriate pain scale   Administer analgesics based on type and severity of pain and evaluate response   Implement non-pharmacological measures as appropriate and evaluate response     Problem: Skin/Tissue Integrity  Goal: Skin integrity remains intact  Description: 1.  Monitor for areas of redness and/or skin breakdown  2.  Assess vascular access sites hourly  3.  Every 4-6 hours minimum:  Change oxygen saturation probe site  4.  Every 4-6 hours:  If on nasal continuous positive airway pressure, respiratory  therapy assess nares and determine need for appliance change or resting period  Outcome: Progressing  Flowsheets (Taken 7/5/2025 0916 by Jean Ramirez, RN)  Skin Integrity Remains Intact:   Monitor for areas of redness and/or skin breakdown   Every 4-6 hours minimum:  Change oxygen saturation probe site   Every 4-6 hours:  If on nasal continuous positive airway pressure, assess nares and determine need for appliance change or resting period   Assess vascular access sites hourly   Turn and reposition as indicated     Problem: ABCDS Injury Assessment  Goal: Absence of physical injury  Outcome: Progressing  Flowsheets (Taken 7/3/2025 1038 by Paty Glaser, RN)  Absence of Physical Injury: Implement safety measures based on patient assessment     Problem: Safety - Adult  Goal: Free from fall injury  Outcome: Progressing  Flowsheets (Taken 7/5/2025 0916 by Jean Ramirez, RN)  Free From Fall Injury: Instruct family/caregiver on patient safety     Problem: Neurosensory - Adult  Goal: Achieves stable or improved neurological status  Outcome: Progressing  Flowsheets (Taken 7/5/2025 0916 by Jean Ramirez, RN)  Achieves stable or improved neurological status:   Assess for and report changes in neurological status   Maintain blood pressure and fluid volume within ordered parameters to optimize cerebral perfusion and minimize risk of hemorrhage   Monitor temperature, glucose, and sodium. Initiate appropriate interventions as ordered  Goal: Achieves maximal functionality and self care  Outcome: Progressing  Flowsheets (Taken 7/5/2025 0916 by Jean Ramirez, RN)  Achieves maximal functionality and self care:   Monitor swallowing and airway patency with patient fatigue and changes in neurological status   Encourage and assist patient to increase activity and self care with guidance from physical therapy/occupational therapy   Encourage visually impaired, hearing impaired and aphasic patients to use

## 2025-07-08 NOTE — PROGRESS NOTES
Patient transferred from CVICU to 5108. Patient's POA Mary updated. Patient cleaned and got up to chair with stedy x2. Patient oriented to room and belongings documented. Patient noted to be missing glasses. I called the CVICU RN to question if glasses had been found, she stated, \"they're probably in MRI.\" I contacted MRI and they do not have the patient's glasses. Charge RN aware. Patient placed in chair with chair alarm on. Educated on how to use call light. Tele resumed and CMU confirmed.

## 2025-07-09 LAB
ANION GAP SERPL CALCULATED.3IONS-SCNC: 11 MMOL/L (ref 3–16)
BUN SERPL-MCNC: 15 MG/DL (ref 7–20)
CALCIUM SERPL-MCNC: 9.3 MG/DL (ref 8.3–10.6)
CHLORIDE SERPL-SCNC: 99 MMOL/L (ref 99–110)
CO2 SERPL-SCNC: 28 MMOL/L (ref 21–32)
CREAT SERPL-MCNC: 0.9 MG/DL (ref 0.6–1.2)
GFR SERPLBLD CREATININE-BSD FMLA CKD-EPI: 65 ML/MIN/{1.73_M2}
GLUCOSE SERPL-MCNC: 122 MG/DL (ref 70–99)
MAGNESIUM SERPL-MCNC: 1.57 MG/DL (ref 1.8–2.4)
POTASSIUM SERPL-SCNC: 3.2 MMOL/L (ref 3.5–5.1)
SODIUM SERPL-SCNC: 138 MMOL/L (ref 136–145)

## 2025-07-09 PROCEDURE — 83735 ASSAY OF MAGNESIUM: CPT

## 2025-07-09 PROCEDURE — 36415 COLL VENOUS BLD VENIPUNCTURE: CPT

## 2025-07-09 PROCEDURE — 80048 BASIC METABOLIC PNL TOTAL CA: CPT

## 2025-07-09 PROCEDURE — 94760 N-INVAS EAR/PLS OXIMETRY 1: CPT

## 2025-07-09 PROCEDURE — 2580000003 HC RX 258: Performed by: HOSPITALIST

## 2025-07-09 PROCEDURE — 6360000002 HC RX W HCPCS: Performed by: HOSPITALIST

## 2025-07-09 PROCEDURE — 2500000003 HC RX 250 WO HCPCS: Performed by: STUDENT IN AN ORGANIZED HEALTH CARE EDUCATION/TRAINING PROGRAM

## 2025-07-09 PROCEDURE — 6370000000 HC RX 637 (ALT 250 FOR IP): Performed by: STUDENT IN AN ORGANIZED HEALTH CARE EDUCATION/TRAINING PROGRAM

## 2025-07-09 PROCEDURE — 97530 THERAPEUTIC ACTIVITIES: CPT

## 2025-07-09 PROCEDURE — 2060000000 HC ICU INTERMEDIATE R&B

## 2025-07-09 PROCEDURE — 6360000002 HC RX W HCPCS: Performed by: STUDENT IN AN ORGANIZED HEALTH CARE EDUCATION/TRAINING PROGRAM

## 2025-07-09 PROCEDURE — 6370000000 HC RX 637 (ALT 250 FOR IP): Performed by: HOSPITALIST

## 2025-07-09 RX ORDER — MAGNESIUM SULFATE IN WATER 40 MG/ML
2000 INJECTION, SOLUTION INTRAVENOUS ONCE
Status: DISCONTINUED | OUTPATIENT
Start: 2025-07-09 | End: 2025-07-09

## 2025-07-09 RX ADMIN — PANTOPRAZOLE SODIUM 40 MG: 40 TABLET, DELAYED RELEASE ORAL at 06:12

## 2025-07-09 RX ADMIN — CLOPIDOGREL BISULFATE 75 MG: 75 TABLET, FILM COATED ORAL at 09:17

## 2025-07-09 RX ADMIN — LAMOTRIGINE 25 MG: 25 TABLET ORAL at 22:21

## 2025-07-09 RX ADMIN — DICYCLOMINE HYDROCHLORIDE 10 MG: 10 CAPSULE ORAL at 15:27

## 2025-07-09 RX ADMIN — ASPIRIN 81 MG: 81 TABLET, CHEWABLE ORAL at 09:18

## 2025-07-09 RX ADMIN — LISINOPRIL 5 MG: 5 TABLET ORAL at 09:17

## 2025-07-09 RX ADMIN — BACLOFEN 10 MG: 10 TABLET ORAL at 22:21

## 2025-07-09 RX ADMIN — SERTRALINE 50 MG: 50 TABLET, FILM COATED ORAL at 09:17

## 2025-07-09 RX ADMIN — SODIUM CHLORIDE, PRESERVATIVE FREE 10 ML: 5 INJECTION INTRAVENOUS at 09:19

## 2025-07-09 RX ADMIN — SODIUM CHLORIDE, PRESERVATIVE FREE 10 ML: 5 INJECTION INTRAVENOUS at 22:22

## 2025-07-09 RX ADMIN — Medication 2000 UNITS: at 09:17

## 2025-07-09 RX ADMIN — ENOXAPARIN SODIUM 40 MG: 100 INJECTION SUBCUTANEOUS at 09:18

## 2025-07-09 RX ADMIN — ATORVASTATIN CALCIUM 80 MG: 80 TABLET, FILM COATED ORAL at 22:21

## 2025-07-09 RX ADMIN — POTASSIUM BICARBONATE 20 MEQ: 391 TABLET, EFFERVESCENT ORAL at 18:28

## 2025-07-09 RX ADMIN — MAGNESIUM SULFATE HEPTAHYDRATE: 500 INJECTION, SOLUTION INTRAMUSCULAR; INTRAVENOUS at 20:15

## 2025-07-09 ASSESSMENT — PAIN SCALES - GENERAL: PAINLEVEL_OUTOF10: 0

## 2025-07-09 NOTE — CARE COORDINATION
Discharge order acknowledged.    [facility]: spoke to [facility], confirmed they can accept patient and will initiate precert.     Called to [facility] #[phone], notified that [facility] can accept and discharge order has been placed.   Notified that insurance approval process initiated by facility.     Plan:  Discharging to Facility/ Agency   Name: [facility]  Address:  [address] [place] [address]   Phone:  [phone]  Fax:  [phone]    NEED: insurance approval, HENS, and notify emergency contact ([facility]) of discharge time.     FARIDEH Mcneill, LSW, Social Work/Case Management   [phone]  Electronically signed by FARIDEH Mcneill on 7/9/2025 at 1:50 PM

## 2025-07-09 NOTE — PLAN OF CARE
Problem: Discharge Planning  Goal: Discharge to home or other facility with appropriate resources  Outcome: Progressing     Problem: Pain  Goal: Verbalizes/displays adequate comfort level or baseline comfort level  Outcome: Progressing     Problem: Skin/Tissue Integrity  Goal: Skin integrity remains intact  Outcome: Progressing     Problem: Safety - Adult  Goal: Free from fall injury  Outcome: Progressing     Problem: Neurosensory - Adult  Goal: Achieves stable or improved neurological status  Outcome: Progressing

## 2025-07-09 NOTE — CARE COORDINATION
Discharge order acknowledged.   Voicemail received from [facility] #[phone], requesting an update on placement in nursing placement.    Skilled nursing facility follow up   [facility]: Called [facility], voicemail left requesting return phone call to verify if they can accept patient. Notified of discharge order.     [facility]: called to Jeannette in admissions, voicemail left requesting return phone call to verify if they can accept patient. Notified of discharge order.     FARIDEH Mcneill, LSW, Social Work/Case Management   [phone]  Electronically signed by FARIDEH Mcneill on 7/9/2025 at 12:00 PM    [facility]: Spoke to Jeannette with admissions, declined due to medical complexity.     Electronically signed by FARIDEH Mcneill on 7/9/2025 at 12:26 PM

## 2025-07-09 NOTE — PLAN OF CARE
Problem: Chronic Conditions and Co-morbidities  Goal: Patient's chronic conditions and co-morbidity symptoms are monitored and maintained or improved  Outcome: Progressing     Problem: Discharge Planning  Goal: Discharge to home or other facility with appropriate resources  7/9/2025 0036 by Priscila Mena RN  Outcome: Progressing     Problem: Pain  Goal: Verbalizes/displays adequate comfort level or baseline comfort level  7/9/2025 0036 by Priscila Mena RN  Outcome: Progressing     Problem: Skin/Tissue Integrity  Goal: Skin integrity remains intact  Description: 1.  Monitor for areas of redness and/or skin breakdown  2.  Assess vascular access sites hourly  3.  Every 4-6 hours minimum:  Change oxygen saturation probe site  4.  Every 4-6 hours:  If on nasal continuous positive airway pressure, respiratory therapy assess nares and determine need for appliance change or resting period  7/9/2025 1017 by Jailene Lau RN  Outcome: Progressing  7/9/2025 0036 by Priscila Mena RN  Outcome: Progressing     Problem: ABCDS Injury Assessment  Goal: Absence of physical injury  Outcome: Progressing     Problem: Safety - Adult  Goal: Free from fall injury  7/9/2025 0036 by Priscila Mena RN  Outcome: Progressing     Problem: Neurosensory - Adult  Goal: Achieves stable or improved neurological status  7/9/2025 1017 by Jailene Lau RN  Outcome: Progressing  7/9/2025 0036 by Priscila Mena RN  Outcome: Progressing  Goal: Achieves maximal functionality and self care  Outcome: Progressing

## 2025-07-09 NOTE — PROGRESS NOTES
Occupational Therapy        [facility] - Occupational Therapy   Phone: [phone]    Occupational Therapy  Facility/Department: [facility] 5W      [] Initial Evaluation            [x] Daily Treatment Note         [] Discharge Summary      Patient: Darshana Grace   : 1945   MRN: [id number]   Date of Service:  2025    Staff Mobility Recommendation: Stedy x1    AM-PAC Score: 13/24  Discharge Recommendations: SNF  Darshana Grace scored a 13/24 on the AM-PAC ADL Inpatient form. Current research shows that an AM-PAC score of 17 or less is typically not associated with a discharge to the patient's home setting. Based on the patient's AM-PAC score and their current ADL deficits, it is recommended that the patient have 3-5 sessions per week of Occupational Therapy at d/c to increase the patient's independence.  Please see assessment section for further patient specific details.    If patient discharges prior to next session this note will serve as a discharge summary.  Please see below for the latest assessment towards goals.      Admitting Diagnosis:  Stroke-like symptoms  Ordering Physician: Master Garcia DO   Current Admission Summary: Per H&P: \"79f recently admitted with CVA with resultant R facial, upper/lower ext weakness and dysarthria, presents to the ER due to lethargy and \"not speaking\" according to family. \" CT head showed: 1. No acute intracranial abnormality.   2.  Expected evolution of previous left PCA territory infarct. MRI pending       Past Medical History:  has a past medical history of Arthritis, Bell's palsy, Cerebral artery occlusion with cerebral infarction (HCC), CKD (chronic kidney disease), Claustrophobia, Diabetes mellitus (HCC), Esophageal reflux, Hx of blood clots, Hyperlipidemia, Hypertension, Kidney stone, Nervous, Pulmonary embolism (HCC), and Stuttering.  Past Surgical History:  has a past surgical history that includes Hysterectomy; Appendectomy; Cardiac

## 2025-07-09 NOTE — DISCHARGE SUMMARY
Hospital Medicine Discharge Summary    Patient ID: Darshana Grace      Patient's PCP: Eugenia Lamb MD    Admit Date: 7/1/2025     Discharge Date:   7/9/25    Admitting Physician: Valentina Borden Jr., MD     Discharge Physician: Eriberto Duong MD     Discharge Diagnoses:       Active Hospital Problems    Diagnosis     Stroke-like symptoms [R29.90]        The patient was seen and examined on day of discharge and this discharge summary is in conjunction with any daily progress note from day of discharge.    Hospital Course:   Darshana Grace is a 79 y.o. female with pertinent PMHx of CVA, CKD, T2DM, HTN, HLD who presented with altered mental status and trouble speaking concerning for new acute CVA.     Acute CVA     Continue DAPT as per neurology recommendation  Continue Lipitor  Continue neurochecks  Continue PT and OT     AMS : improved     UTI : improved  Completed IV Rocephin treatment     Hypertension  : Continue lisinopril       7/9      Clinically stable  Vital signs stable  Denies chest pain or sob        Denies abdominal pain , nausea, vomiting , overt GI bleed , fever , chills       Physical Exam Performed:     /71   Pulse 62   Temp 97.7 °F (36.5 °C) (Oral)   Resp 16   Ht 1.702 m (5' 7\")   Wt 64.5 kg (142 lb 3.2 oz)   SpO2 95%   BMI 22.27 kg/m²       General: NAD  Eyes: EOMI  ENT: neck supple  Cardiovascular: Regular rate.  Respiratory: Clear to auscultation  Gastrointestinal: Soft, non tender  Genitourinary: no suprapubic tenderness  Musculoskeletal: No edema  Skin: warm, dry  Neuro: Alert.     Dysarthria +     Facial asymmetry +     R UE and RLE weakness +  Psych: Mood appropriate.     Labs: For convenience and continuity at follow-up the following most recent labs are provided:      CBC:    Lab Results   Component Value Date/Time    WBC 9.4 07/07/2025 07:39 AM    HGB 14.6 07/07/2025 07:39 AM    HCT 41.3 07/07/2025 07:39 AM     07/07/2025 07:39 AM       Renal:   bedside

## 2025-07-10 PROCEDURE — 6370000000 HC RX 637 (ALT 250 FOR IP): Performed by: STUDENT IN AN ORGANIZED HEALTH CARE EDUCATION/TRAINING PROGRAM

## 2025-07-10 PROCEDURE — 6360000002 HC RX W HCPCS: Performed by: STUDENT IN AN ORGANIZED HEALTH CARE EDUCATION/TRAINING PROGRAM

## 2025-07-10 PROCEDURE — 92507 TX SP LANG VOICE COMM INDIV: CPT

## 2025-07-10 PROCEDURE — 92526 ORAL FUNCTION THERAPY: CPT

## 2025-07-10 PROCEDURE — 2500000003 HC RX 250 WO HCPCS: Performed by: STUDENT IN AN ORGANIZED HEALTH CARE EDUCATION/TRAINING PROGRAM

## 2025-07-10 PROCEDURE — 2060000000 HC ICU INTERMEDIATE R&B

## 2025-07-10 PROCEDURE — 94760 N-INVAS EAR/PLS OXIMETRY 1: CPT

## 2025-07-10 PROCEDURE — 97129 THER IVNTJ 1ST 15 MIN: CPT

## 2025-07-10 PROCEDURE — 97530 THERAPEUTIC ACTIVITIES: CPT

## 2025-07-10 PROCEDURE — 6370000000 HC RX 637 (ALT 250 FOR IP): Performed by: HOSPITALIST

## 2025-07-10 RX ADMIN — CLOPIDOGREL BISULFATE 75 MG: 75 TABLET, FILM COATED ORAL at 09:01

## 2025-07-10 RX ADMIN — Medication 2000 UNITS: at 09:00

## 2025-07-10 RX ADMIN — DICYCLOMINE HYDROCHLORIDE 10 MG: 10 CAPSULE ORAL at 16:18

## 2025-07-10 RX ADMIN — ASPIRIN 81 MG: 81 TABLET, CHEWABLE ORAL at 09:01

## 2025-07-10 RX ADMIN — ENOXAPARIN SODIUM 40 MG: 100 INJECTION SUBCUTANEOUS at 09:01

## 2025-07-10 RX ADMIN — LISINOPRIL 5 MG: 5 TABLET ORAL at 09:00

## 2025-07-10 RX ADMIN — SODIUM CHLORIDE, PRESERVATIVE FREE 10 ML: 5 INJECTION INTRAVENOUS at 09:01

## 2025-07-10 RX ADMIN — DICYCLOMINE HYDROCHLORIDE 10 MG: 10 CAPSULE ORAL at 11:58

## 2025-07-10 RX ADMIN — SERTRALINE 50 MG: 50 TABLET, FILM COATED ORAL at 09:01

## 2025-07-10 RX ADMIN — DICYCLOMINE HYDROCHLORIDE 10 MG: 10 CAPSULE ORAL at 09:00

## 2025-07-10 RX ADMIN — SODIUM CHLORIDE, PRESERVATIVE FREE 10 ML: 5 INJECTION INTRAVENOUS at 20:53

## 2025-07-10 RX ADMIN — PANTOPRAZOLE SODIUM 40 MG: 40 TABLET, DELAYED RELEASE ORAL at 09:00

## 2025-07-10 RX ADMIN — LAMOTRIGINE 25 MG: 25 TABLET ORAL at 20:53

## 2025-07-10 RX ADMIN — BACLOFEN 10 MG: 10 TABLET ORAL at 20:53

## 2025-07-10 RX ADMIN — ATORVASTATIN CALCIUM 80 MG: 80 TABLET, FILM COATED ORAL at 20:53

## 2025-07-10 ASSESSMENT — PAIN SCALES - GENERAL: PAINLEVEL_OUTOF10: 0

## 2025-07-10 NOTE — PROGRESS NOTES
V2.0    [facility] Progress Note      Name:  Darshana Grace /Age/Sex: 1945  (79 y.o. female)   MRN & CSN:  [id number] & [id number] Encounter Date/Time: 7/10/2025 1:02 PM EDT   Location:  [id number]-0529/5110-01 PCP: Eugenia Lamb MD     Attending:Eriberto Duong MD       Hospital Day: 10    Assessment and Recommendations   Darshana Grace is a 79 y.o. female with pmh of  who presents with Stroke-like symptoms      Acute CVA    Continue DAPT as per neurology recommendation  Continue Lipitor  Continue neurochecks  Continue PT and OT    AMS : improved    UTI : improved  Completed IV Rocephin treatment    Hypertension  : Continue lisinopril    Stable for DC        Diet ADULT DIET; Easy to Chew; Low Fat/Low Chol/High Fiber/AZALEA  ADULT ORAL NUTRITION SUPPLEMENT; Breakfast, Lunch, Dinner; Standard High Calorie/High Protein Oral Supplement   DVT Prophylaxis [] Lovenox, []  Heparin, [] SCDs, [] Ambulation,  [] Eliquis, [] Xarelto  [] Coumadin   Code Status DNR-CCA   Disposition From:   Expected Disposition:  Estimated Date of Discharge:   Patient requires continued admission due to    Surrogate Decision Maker/ POA         Personally reviewed Lab Studies and Imaging      Discharge planning discussed with the case management in multidisciplinary round and plan is to discharge to SNF       Telemetry reviewed by myself no ST elevation             Medical Decision Making:  The following items were considered in medical decision making:  Discussion of patient care with other providers  Reviewed clinical lab tests  Reviewed radiology tests  Reviewed other diagnostic tests/interventions  Independent review of radiologic images  Microbiology cultures and other micro tests reviewed            Subjective:     Chief Complaint:     Darshana Grace is a 79 y.o. female who presents with     Patient sitting in chair  Alert awake and able to communicate +  AAO x 3  No acute overnight events  Denies chest pain or  ORG Escherichia coli 06/14/2025 04:30 PM    ORG Proteus mirabilis 06/14/2025 04:30 PM         Electronically signed by Eriberto Duong MD on 7/10/2025 at 12:00 PM

## 2025-07-10 NOTE — PLAN OF CARE
Problem: Chronic Conditions and Co-morbidities  Goal: Patient's chronic conditions and co-morbidity symptoms are monitored and maintained or improved  Outcome: Progressing  Flowsheets (Taken 7/8/2025 0800 by Summer Prakash, RN)  Care Plan - Patient's Chronic Conditions and Co-Morbidity Symptoms are Monitored and Maintained or Improved:   Monitor and assess patient's chronic conditions and comorbid symptoms for stability, deterioration, or improvement   Collaborate with multidisciplinary team to address chronic and comorbid conditions and prevent exacerbation or deterioration   Update acute care plan with appropriate goals if chronic or comorbid symptoms are exacerbated and prevent overall improvement and discharge     Problem: Discharge Planning  Goal: Discharge to home or other facility with appropriate resources  Outcome: Progressing  Flowsheets (Taken 7/8/2025 0800 by Summer Prakash, RN)  Discharge to home or other facility with appropriate resources:   Identify barriers to discharge with patient and caregiver   Arrange for needed discharge resources and transportation as appropriate     Problem: Pain  Goal: Verbalizes/displays adequate comfort level or baseline comfort level  Outcome: Progressing  Flowsheets (Taken 7/5/2025 0916 by Jean Ramirez RN)  Verbalizes/displays adequate comfort level or baseline comfort level:   Encourage patient to monitor pain and request assistance   Assess pain using appropriate pain scale   Administer analgesics based on type and severity of pain and evaluate response   Implement non-pharmacological measures as appropriate and evaluate response     Problem: Skin/Tissue Integrity  Goal: Skin integrity remains intact  Description: 1.  Monitor for areas of redness and/or skin breakdown  2.  Assess vascular access sites hourly  3.  Every 4-6 hours minimum:  Change oxygen saturation probe site  4.  Every 4-6 hours:  If on nasal continuous positive airway pressure, respiratory

## 2025-07-10 NOTE — PROGRESS NOTES
Comprehensive Nutrition Assessment    Type and Reason for Visit:  Initial, LOS    Nutrition Recommendations/Plan:   Diet order as tolerated, currently on easy to chew, monitor need for adjustment  Discontinue Ensure and offer Magic cup bid  Will monitor nutritional adequacy, nutrition-related labs, weights, BMs, and clinical progress       Malnutrition Assessment:  Malnutrition Status:  At risk for malnutrition (07/10/25 5433)    Context:  Acute Illness     Findings of the 6 clinical characteristics of malnutrition:  Energy Intake:  50% or less of estimated energy requirements for 5 or more days  Weight Loss:  Unable to assess (weight fluctuating daily)     Body Fat Loss:  No body fat loss     Muscle Mass Loss:  No muscle mass loss    Fluid Accumulation:  Unable to assess     Strength:  Not Performed    Nutrition Assessment:    LOS assessment.  Currently on an easy to chew textured diet.  Po intake has been mostly less than 50%.  Patient agreed to Magic cup butter pecan, didn't want the Ensure drinks anymore.  Patient's speech slurred, family was able to understand patient's answers during my visit.    Nutrition Related Findings:    K 3.4, Mg 1.57 on 7/9; last BM on 7/8 Wound Type:  (dakota redness)       Current Nutrition Intake & Therapies:    Average Meal Intake: 26-50%  Average Supplements Intake: Refusing to take (modifed suplement order)  ADULT DIET; Easy to Chew; Low Fat/Low Chol/High Fiber/AZALEA  ADULT ORAL NUTRITION SUPPLEMENT; Lunch, Dinner; Frozen Oral Supplement    Anthropometric Measures:  Height: 170.2 cm (5' 7\")  Ideal Body Weight (IBW): 135 lbs (61 kg)       Current Body Weight: 67.3 kg (148 lb 5.9 oz),   IBW. Weight Source: Bed scale  Current BMI (kg/m2): 23.2                             BMI Categories: Normal Weight (BMI 18.5-24.9)    Estimated Daily Nutrient Needs:  Energy Requirements Based On: Kcal/kg  Weight Used for Energy Requirements: Current  Energy (kcal/day): 4065-8576 (25-30 kcal/67.3

## 2025-07-10 NOTE — PROGRESS NOTES
NAME:  Darshana Grace  YOB: 1945  MEDICAL RECORD NUMBER:  [id number]  TODAYS DATE:  7/10/2025    Discussed personal risk factors for Stroke/TIA with patient/family, and ways to reduce the risk for a recurrent stroke. Patient's personal risk factors which were identified are:     [] Alcohol Abuse: check with your physician before any alcohol consumption.   [] Atrial fibrillation: may cause blood clots.  [] Drug Abuse: Seek help, talk with your doctor  [] Clotting Disorder  [] Diabetes  [] Family history of stroke or heart disease  [x] High Blood Pressure/Hypertension: work with your physician.  [x] High cholesterol: monitor cholesterol levels with your physician.   [] Overweight/Obesity: work with your physician for your ideal body weight.  [x] Physical Inactivity: get regular exercise as directed by your physician.   [x] Personal history of previous TIA or stroke  [x] Poor Diet; decrease salt (sodium) in your diet, follow diet directed by physician.   [] Smoking: Cigarette/Cigar: stop smoking.         Advised pt. that you can reduce your risk for stroke/TIA by modifying/controlling the risk factors that you have. Pt.advised to take the medications as prescribed, which will be detailed in the discharge instructions, and to not stop taking them without consulting their physician. In addition, pt. advised to maintain a healthy diet, exercise regularly and to not smoke.      [facility]'s Stroke treatment and prevention, Managing your recovery  notebook  provided and/or reviewed  with patient/family.  The notebook includes, but not limited to, sections addressing warning signs & symptoms of a stroke, which are: sudden numbness or weakness especially on one side of the body, sudden confusion, difficulty speaking or understanding, sudden changes in vision, sudden dizziness or loss of balance/ coordination, sudden severe headache, syncope and seizure.  The need to call EMS (911) immediately if signs

## 2025-07-10 NOTE — PROGRESS NOTES
Occupational Therapy        [facility] - Occupational Therapy   Phone: [phone]    Occupational Therapy  Facility/Department: [facility] 5W      [] Initial Evaluation            [x] Daily Treatment Note         [] Discharge Summary      Patient: Darshana Grace   : 1945   MRN: [id number]   Date of Service:  7/10/2025    Staff Mobility Recommendation: Stedy x1    AM-PAC Score: 13/24  Discharge Recommendations: SNF  Darshana Grace scored a 13/24 on the AM-PAC ADL Inpatient form. Current research shows that an AM-PAC score of 17 or less is typically not associated with a discharge to the patient's home setting. Based on the patient's AM-PAC score and their current ADL deficits, it is recommended that the patient have 3-5 sessions per week of Occupational Therapy at d/c to increase the patient's independence.  Please see assessment section for further patient specific details.    If patient discharges prior to next session this note will serve as a discharge summary.  Please see below for the latest assessment towards goals.      Admitting Diagnosis:  Stroke-like symptoms  Ordering Physician: Master Garcia DO   Current Admission Summary: Per H&P: \"79f recently admitted with CVA with resultant R facial, upper/lower ext weakness and dysarthria, presents to the ER due to lethargy and \"not speaking\" according to family. \" CT head showed: 1. No acute intracranial abnormality.   2.  Expected evolution of previous left PCA territory infarct. MRI pending       Past Medical History:  has a past medical history of Arthritis, Bell's palsy, Cerebral artery occlusion with cerebral infarction (HCC), CKD (chronic kidney disease), Claustrophobia, Diabetes mellitus (HCC), Esophageal reflux, Hx of blood clots, Hyperlipidemia, Hypertension, Kidney stone, Nervous, Pulmonary embolism (HCC), and Stuttering.  Past Surgical History:  has a past surgical history that includes Hysterectomy; Appendectomy; Cardiac  supervision  Patient will complete functional mobility with supervision  Patient to maintain standing with supervision for 5 minutes.    Above goals reviewed on 7/10/2025.  All goals are ongoing at this time unless indicated above.       Therapy Session Time     Individual Group Co-treatment   Time In 923     Time Out 948     Minutes 25          Timed Code Treatment Minutes: 25 Minutes  Total Treatment Minutes:  25 minutes       Minutes per charge:  Therapeutic activity: 25 minutes      Electronically signed by STEVEN Freitas on 7/10/2025 at 9:50 AM

## 2025-07-10 NOTE — PLAN OF CARE
Problem: Chronic Conditions and Co-morbidities  Goal: Patient's chronic conditions and co-morbidity symptoms are monitored and maintained or improved  7/10/2025 1413 by Jacey Candelaria RN  Outcome: Progressing     Problem: Discharge Planning  Goal: Discharge to home or other facility with appropriate resources  7/10/2025 1413 by Jacey Candelaria RN  Outcome: Progressing     Problem: Pain  Goal: Verbalizes/displays adequate comfort level or baseline comfort level  7/10/2025 1413 by Jacey Candelaria RN  Outcome: Progressing     Problem: Skin/Tissue Integrity  Goal: Skin integrity remains intact  Description: 1.  Monitor for areas of redness and/or skin breakdown  2.  Assess vascular access sites hourly  3.  Every 4-6 hours minimum:  Change oxygen saturation probe site  4.  Every 4-6 hours:  If on nasal continuous positive airway pressure, respiratory therapy assess nares and determine need for appliance change or resting period  7/10/2025 1413 by Jacey Candelaria RN  Outcome: Progressing     Problem: ABCDS Injury Assessment  Goal: Absence of physical injury  7/10/2025 1413 by Jacey Candelaria RN  Outcome: Progressing     Problem: Safety - Adult  Goal: Free from fall injury  7/10/2025 1413 by Jacey Candelaria RN  Outcome: Progressing     Problem: Neurosensory - Adult  Goal: Achieves stable or improved neurological status  7/10/2025 1413 by Jacey Candelaria RN  Outcome: Progressing     Problem: Neurosensory - Adult  Goal: Achieves maximal functionality and self care  7/10/2025 1413 by Jacey Candelaria RN  Outcome: Progressing

## 2025-07-10 NOTE — CARE COORDINATION
Discharge Planning:    Per chart review, precert was initiated yesterday for [facility]. Call placed to Cayla, admissions with [facility], to check on status of precert. Left message requesting return call; awaiting response at this time.    Electronically signed by CODY MENDOZA RN on 7/10/2025 at 9:13 AM

## 2025-07-10 NOTE — PROGRESS NOTES
Facility/Department: [address] CVICU   DYSPHAGIA THERAPY and SPEECH / LANGUAGE / COGNITIVE-LINGUISTIC TREATMENT    Patient: Darshana Grace   : 1945   MRN: [id number]      Treatment Date: 7/10/2025      Admitting Dx:   TIA (transient ischemic attack) [G45.9]  Stroke-like symptoms [R29.90]   has a past medical history of Arthritis, Bell's palsy, Cerebral artery occlusion with cerebral infarction (HCC), CKD (chronic kidney disease), Claustrophobia, Diabetes mellitus (HCC), Esophageal reflux, blood clots, Hyperlipidemia, Hypertension, Kidney stone, Nervous, Pulmonary embolism (HCC), and Stuttering.   has a past surgical history that includes Hysterectomy; Appendectomy; Cardiac catheterization; Cholecystectomy; Colonoscopy; Tonsillectomy; knee surgery (Right); blepharoplasty (Bilateral, 2021); Abdomen surgery; and Endoscopy, colon, diagnostic.  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 per \"family\"); seen recently during prior acute admission s/p stroke for speech/lang/cog  Dysphagia History including instrumental assessment: rec for easy chew/thin s/p prior stroke  GI history: h/o esophageal reflux  Prior level of function (communication, home/med/finance management, driving, etc) and living status: admitted from home alone following psych admission from acute care s/p stroke - patient reports home alone did not go well; prior to stroke, was independent for ADLs and IADLs, but reduced level of function/independence s/p stroke; was an active  prior to stroke, but not medically cleared to drive post-stroke; 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

## 2025-07-11 VITALS
SYSTOLIC BLOOD PRESSURE: 111 MMHG | OXYGEN SATURATION: 95 % | TEMPERATURE: 98.6 F | BODY MASS INDEX: 22.25 KG/M2 | HEIGHT: 67 IN | RESPIRATION RATE: 16 BRPM | DIASTOLIC BLOOD PRESSURE: 64 MMHG | WEIGHT: 141.76 LBS | HEART RATE: 63 BPM

## 2025-07-11 PROCEDURE — 6370000000 HC RX 637 (ALT 250 FOR IP): Performed by: STUDENT IN AN ORGANIZED HEALTH CARE EDUCATION/TRAINING PROGRAM

## 2025-07-11 PROCEDURE — 6360000002 HC RX W HCPCS: Performed by: STUDENT IN AN ORGANIZED HEALTH CARE EDUCATION/TRAINING PROGRAM

## 2025-07-11 PROCEDURE — 94760 N-INVAS EAR/PLS OXIMETRY 1: CPT

## 2025-07-11 PROCEDURE — 2500000003 HC RX 250 WO HCPCS: Performed by: STUDENT IN AN ORGANIZED HEALTH CARE EDUCATION/TRAINING PROGRAM

## 2025-07-11 PROCEDURE — 6370000000 HC RX 637 (ALT 250 FOR IP): Performed by: HOSPITALIST

## 2025-07-11 RX ADMIN — ASPIRIN 81 MG: 81 TABLET, CHEWABLE ORAL at 08:51

## 2025-07-11 RX ADMIN — SODIUM CHLORIDE, PRESERVATIVE FREE 10 ML: 5 INJECTION INTRAVENOUS at 08:50

## 2025-07-11 RX ADMIN — CLOPIDOGREL BISULFATE 75 MG: 75 TABLET, FILM COATED ORAL at 08:50

## 2025-07-11 RX ADMIN — Medication 2000 UNITS: at 08:51

## 2025-07-11 RX ADMIN — DICYCLOMINE HYDROCHLORIDE 10 MG: 10 CAPSULE ORAL at 16:40

## 2025-07-11 RX ADMIN — DICYCLOMINE HYDROCHLORIDE 10 MG: 10 CAPSULE ORAL at 12:21

## 2025-07-11 RX ADMIN — ENOXAPARIN SODIUM 40 MG: 100 INJECTION SUBCUTANEOUS at 08:50

## 2025-07-11 RX ADMIN — SERTRALINE 50 MG: 50 TABLET, FILM COATED ORAL at 08:51

## 2025-07-11 RX ADMIN — LISINOPRIL 5 MG: 5 TABLET ORAL at 08:50

## 2025-07-11 RX ADMIN — PANTOPRAZOLE SODIUM 40 MG: 40 TABLET, DELAYED RELEASE ORAL at 08:50

## 2025-07-11 RX ADMIN — DICYCLOMINE HYDROCHLORIDE 10 MG: 10 CAPSULE ORAL at 08:51

## 2025-07-11 NOTE — PROGRESS NOTES
V2.0    [facility] Progress Note      Name:  Darshana Grace /Age/Sex: 1945  (79 y.o. female)   MRN & CSN:  [id number] & [id number] Encounter Date/Time: 2025 1:02 PM EDT   Location:  [place] [address]01 PCP: Eugenia Lamb MD     Attending:Eriberto Duong MD       Hospital Day: 11    Assessment and Recommendations   Darshana Grace is a 79 y.o. female with pmh of  who presents with Stroke-like symptoms      Acute CVA    Continue DAPT as per neurology recommendation  Continue Lipitor  Continue neurochecks  Continue PT and OT    AMS : improved    UTI : improved  Completed IV Rocephin treatment    Hypertension  : Continue lisinopril    Stable for DC    Discharge planning discussed with the case management in multidisciplinary round and plan is to discharge to SNF , awaiting placement          Diet ADULT DIET; Easy to Chew; Low Fat/Low Chol/High Fiber/AZALEA  ADULT ORAL NUTRITION SUPPLEMENT; Lunch, Dinner; Frozen Oral Supplement   DVT Prophylaxis [] Lovenox, []  Heparin, [] SCDs, [] Ambulation,  [] Eliquis, [] Xarelto  [] Coumadin   Code Status DNR-CCA   Disposition From:   Expected Disposition:  Estimated Date of Discharge:   Patient requires continued admission due to    Surrogate Decision Maker/ POA         Personally reviewed Lab Studies and Imaging      Discharge planning discussed with the case management in multidisciplinary round and plan is to discharge to SNF       Telemetry reviewed by myself no ST elevation             Medical Decision Making:  The following items were considered in medical decision making:  Discussion of patient care with other providers  Reviewed clinical lab tests  Reviewed radiology tests  Reviewed other diagnostic tests/interventions  Independent review of radiologic images  Microbiology cultures and other micro tests reviewed            Subjective:     Chief Complaint:     Darshana Grace is a 79 y.o. female who presents with     Patient sitting in  demonstrated. Decreased attenuation is again noted within the left occipital region, related to the recent posterior cerebral artery distribution infarct. The appearance is similar compared  to the prior study. No new areas of acute infarct, acute hemorrhage, or evidence of an intra-axial mass are present. No hydrocephalus or midline shift is present. Empty sella turcica is again demonstrated. ORBITS: No acute abnormality. SINUSES: No acute abnormality. SOFT TISSUES AND SKULL: No acute soft tissue abnormality. No skull fracture.     1. No new areas of acute infarct, acute hemorrhage, or evidence of an intra-axial mass. 2. Stable decreased attenuation in the left occipital region related to the recent posterior cerebral artery distribution infarct. 3. Generalized atrophy and senescent white matter changes.     CTA HEAD NECK W CONTRAST  Result Date: 7/3/2025  EXAMINATION: CTA OF THE HEAD AND NECK WITH CONTRAST 7/3/2025 4:10 pm: TECHNIQUE: CTA of the head and neck was performed with the administration of intravenous contrast. Multiplanar reformatted images are provided for review.  MIP images are provided for review. Stenosis of the internal carotid arteries measured using NASCET criteria. Automated exposure control, iterative reconstruction, and/or weight based adjustment of the mA/kV was utilized to reduce the radiation dose to as low as reasonably achievable. COMPARISON: Brain MRI performed 2 hours prior, CTA head and neck 07/01/2025 HISTORY: ORDERING SYSTEM PROVIDED HISTORY: Recent acute stroke. Fluctuating neurological exam. Please include comparison studies. TECHNOLOGIST PROVIDED HISTORY: Reason for exam:->Recent acute stroke. Fluctuating neurological exam. Please include comparison studies. Has a \"code stroke\" or \"stroke alert\" been called?->No Reason for Exam: Recent acute stroke. Fluctuating neurological exam. Please include comparison studies. FINDINGS: CTA NECK: AORTIC ARCH/ARCH VESSELS: No dissection or

## 2025-07-11 NOTE — CARE COORDINATION
Discharge Planning:    This RN CM placed call to Cayla, suhas with [facility], to check on status of precert. Left message requesting return call; awaiting response at this time. CM to continue to follow for updates.    Electronically signed by CODY MENDOZA RN on 7/11/2025 at 9:36 AM

## 2025-07-11 NOTE — CARE COORDINATION
CASE MANAGEMENT DISCHARGE SUMMARY:    DISCHARGE DATE: 7/11/2025    Discharging to Facility/ Agency   Name: [facility]  Address:  [address] [place], [place] [id number]   Phone:  [phone]  Fax:  [phone]     TRANSPORTATION: [facility]             TIME: 6 pm    INSURANCE PRECERT OBTAINED: via facility    HENS/PASAAR COMPLETED: completed 7/11/2025    COMMENTS: Patient, patient's family, bedside RN, and facility aware of discharge plan and timeline.    Electronically signed by CODY MENDOZA RN on 7/11/2025 at 12:30 PM

## 2025-08-08 ENCOUNTER — HOSPITAL ENCOUNTER (INPATIENT)
Age: 80
LOS: 7 days | Discharge: NURSING, REHAB OR LONG-TERM CARE | End: 2025-08-15
Attending: HOSPITALIST | Admitting: HOSPITALIST
Payer: MEDICARE

## 2025-08-08 ENCOUNTER — APPOINTMENT (OUTPATIENT)
Dept: CT IMAGING | Age: 80
End: 2025-08-08
Payer: MEDICARE

## 2025-08-08 ENCOUNTER — APPOINTMENT (OUTPATIENT)
Dept: GENERAL RADIOLOGY | Age: 80
End: 2025-08-08
Payer: MEDICARE

## 2025-08-08 DIAGNOSIS — I26.99 OTHER PULMONARY EMBOLISM WITHOUT ACUTE COR PULMONALE, UNSPECIFIED CHRONICITY (HCC): Primary | ICD-10-CM

## 2025-08-08 DIAGNOSIS — K62.5 RECTAL BLEEDING: ICD-10-CM

## 2025-08-08 DIAGNOSIS — I26.99 ACUTE PULMONARY EMBOLISM WITHOUT ACUTE COR PULMONALE, UNSPECIFIED PULMONARY EMBOLISM TYPE (HCC): ICD-10-CM

## 2025-08-08 LAB
ABO/RH: NORMAL
ALBUMIN SERPL-MCNC: 3.5 G/DL (ref 3.4–5)
ALBUMIN/GLOB SERPL: 1.8 {RATIO} (ref 1.1–2.2)
ALP SERPL-CCNC: 123 U/L (ref 40–129)
ALT SERPL-CCNC: 27 U/L (ref 10–40)
ANION GAP SERPL CALCULATED.3IONS-SCNC: 13 MMOL/L (ref 3–16)
ANTIBODY SCREEN: NORMAL
APTT BLD: 22.4 SEC (ref 22.8–35.8)
AST SERPL-CCNC: 33 U/L (ref 15–37)
BACTERIA URNS QL MICRO: NORMAL /HPF
BASE EXCESS BLDV CALC-SCNC: -2.6 MMOL/L
BASOPHILS # BLD: 0.1 K/UL (ref 0–0.2)
BASOPHILS NFR BLD: 0.8 %
BILIRUB DIRECT SERPL-MCNC: 0.4 MG/DL (ref 0–0.3)
BILIRUB INDIRECT SERPL-MCNC: 0.5 MG/DL (ref 0–1)
BILIRUB SERPL-MCNC: 0.9 MG/DL (ref 0–1)
BILIRUB UR QL STRIP.AUTO: NEGATIVE
BUN SERPL-MCNC: 20 MG/DL (ref 7–20)
CALCIUM SERPL-MCNC: 9 MG/DL (ref 8.3–10.6)
CHLORIDE SERPL-SCNC: 103 MMOL/L (ref 99–110)
CLARITY UR: CLEAR
CO2 BLDV-SCNC: 25 MMOL/L
CO2 SERPL-SCNC: 25 MMOL/L (ref 21–32)
COHGB MFR BLDV: 1.6 %
COLOR UR: YELLOW
CREAT SERPL-MCNC: 1 MG/DL (ref 0.6–1.2)
DEPRECATED RDW RBC AUTO: 14.5 % (ref 12.4–15.4)
EKG ATRIAL RATE: 82 BPM
EKG DIAGNOSIS: NORMAL
EKG P AXIS: 36 DEGREES
EKG P-R INTERVAL: 170 MS
EKG Q-T INTERVAL: 424 MS
EKG QRS DURATION: 132 MS
EKG QTC CALCULATION (BAZETT): 495 MS
EKG R AXIS: -57 DEGREES
EKG T AXIS: -4 DEGREES
EKG VENTRICULAR RATE: 82 BPM
EOSINOPHIL # BLD: 0 K/UL (ref 0–0.6)
EOSINOPHIL NFR BLD: 0.2 %
EPI CELLS #/AREA URNS AUTO: 1 /HPF (ref 0–5)
GFR SERPLBLD CREATININE-BSD FMLA CKD-EPI: 57 ML/MIN/{1.73_M2}
GLUCOSE BLD-MCNC: 154 MG/DL (ref 70–99)
GLUCOSE SERPL-MCNC: 161 MG/DL (ref 70–99)
GLUCOSE UR STRIP.AUTO-MCNC: NEGATIVE MG/DL
HCO3 BLDV-SCNC: 24 MMOL/L (ref 23–29)
HCT VFR BLD AUTO: 32.4 % (ref 36–48)
HEMOCCULT STL QL: ABNORMAL
HGB BLD-MCNC: 10.8 G/DL (ref 12–16)
HGB UR QL STRIP.AUTO: ABNORMAL
HYALINE CASTS #/AREA URNS AUTO: 0 /LPF (ref 0–8)
INR PPP: 1.03 (ref 0.86–1.14)
KETONES UR STRIP.AUTO-MCNC: NEGATIVE MG/DL
LACTATE BLDV-SCNC: 1.2 MMOL/L (ref 0.4–2)
LACTATE BLDV-SCNC: 2.4 MMOL/L (ref 0.4–2)
LEUKOCYTE ESTERASE UR QL STRIP.AUTO: NEGATIVE
LYMPHOCYTES # BLD: 0.6 K/UL (ref 1–5.1)
LYMPHOCYTES NFR BLD: 6.4 %
MCH RBC QN AUTO: 30.9 PG (ref 26–34)
MCHC RBC AUTO-ENTMCNC: 33.2 G/DL (ref 31–36)
MCV RBC AUTO: 92.8 FL (ref 80–100)
METHGB MFR BLDV: 1.4 %
MONOCYTES # BLD: 0.4 K/UL (ref 0–1.3)
MONOCYTES NFR BLD: 4.6 %
NEUTROPHILS # BLD: 7.6 K/UL (ref 1.7–7.7)
NEUTROPHILS NFR BLD: 88 %
NITRITE UR QL STRIP.AUTO: NEGATIVE
O2 THERAPY: ABNORMAL
PCO2 BLDV: 48.9 MMHG (ref 40–50)
PERFORMED ON: ABNORMAL
PH BLDV: 7.3 [PH] (ref 7.35–7.45)
PH UR STRIP.AUTO: 7.5 [PH] (ref 5–8)
PLATELET # BLD AUTO: 135 K/UL (ref 135–450)
PMV BLD AUTO: 8.8 FL (ref 5–10.5)
PO2 BLDV: <30 MMHG
POTASSIUM SERPL-SCNC: 3.6 MMOL/L (ref 3.5–5.1)
PROT SERPL-MCNC: 5.4 G/DL (ref 6.4–8.2)
PROT UR STRIP.AUTO-MCNC: NEGATIVE MG/DL
PROTHROMBIN TIME: 13.8 SEC (ref 12.1–14.9)
RBC # BLD AUTO: 3.49 M/UL (ref 4–5.2)
RBC CLUMPS #/AREA URNS AUTO: 1 /HPF (ref 0–4)
REASON FOR REJECTION: NORMAL
REJECTED TEST: NORMAL
SAO2 % BLDV: 38 %
SODIUM SERPL-SCNC: 141 MMOL/L (ref 136–145)
SP GR UR STRIP.AUTO: 1.02 (ref 1–1.03)
UA COMPLETE W REFLEX CULTURE PNL UR: ABNORMAL
UA DIPSTICK W REFLEX MICRO PNL UR: YES
URN SPEC COLLECT METH UR: ABNORMAL
UROBILINOGEN UR STRIP-ACNC: 1 E.U./DL
WBC # BLD AUTO: 8.7 K/UL (ref 4–11)
WBC #/AREA URNS AUTO: 0 /HPF (ref 0–5)

## 2025-08-08 PROCEDURE — 85576 BLOOD PLATELET AGGREGATION: CPT

## 2025-08-08 PROCEDURE — 2100000000 HC CCU R&B

## 2025-08-08 PROCEDURE — 2580000003 HC RX 258

## 2025-08-08 PROCEDURE — 74174 CTA ABD&PLVS W/CONTRAST: CPT

## 2025-08-08 PROCEDURE — 82270 OCCULT BLOOD FECES: CPT

## 2025-08-08 PROCEDURE — 99285 EMERGENCY DEPT VISIT HI MDM: CPT

## 2025-08-08 PROCEDURE — 82803 BLOOD GASES ANY COMBINATION: CPT

## 2025-08-08 PROCEDURE — 6360000004 HC RX CONTRAST MEDICATION

## 2025-08-08 PROCEDURE — 70450 CT HEAD/BRAIN W/O DYE: CPT

## 2025-08-08 PROCEDURE — 2500000003 HC RX 250 WO HCPCS

## 2025-08-08 PROCEDURE — P9035 PLATELET PHERES LEUKOREDUCED: HCPCS

## 2025-08-08 PROCEDURE — 86901 BLOOD TYPING SEROLOGIC RH(D): CPT

## 2025-08-08 PROCEDURE — 85730 THROMBOPLASTIN TIME PARTIAL: CPT

## 2025-08-08 PROCEDURE — 83735 ASSAY OF MAGNESIUM: CPT

## 2025-08-08 PROCEDURE — 2500000003 HC RX 250 WO HCPCS: Performed by: HOSPITALIST

## 2025-08-08 PROCEDURE — 96376 TX/PRO/DX INJ SAME DRUG ADON: CPT

## 2025-08-08 PROCEDURE — 86923 COMPATIBILITY TEST ELECTRIC: CPT

## 2025-08-08 PROCEDURE — P9016 RBC LEUKOCYTES REDUCED: HCPCS

## 2025-08-08 PROCEDURE — 6360000002 HC RX W HCPCS

## 2025-08-08 PROCEDURE — 36415 COLL VENOUS BLD VENIPUNCTURE: CPT

## 2025-08-08 PROCEDURE — 2500000003 HC RX 250 WO HCPCS: Performed by: REGISTERED NURSE

## 2025-08-08 PROCEDURE — 86900 BLOOD TYPING SEROLOGIC ABO: CPT

## 2025-08-08 PROCEDURE — 71045 X-RAY EXAM CHEST 1 VIEW: CPT

## 2025-08-08 PROCEDURE — 93005 ELECTROCARDIOGRAM TRACING: CPT

## 2025-08-08 PROCEDURE — 93010 ELECTROCARDIOGRAM REPORT: CPT | Performed by: INTERNAL MEDICINE

## 2025-08-08 PROCEDURE — 30233N1 TRANSFUSION OF NONAUTOLOGOUS RED BLOOD CELLS INTO PERIPHERAL VEIN, PERCUTANEOUS APPROACH: ICD-10-PCS | Performed by: HOSPITALIST

## 2025-08-08 PROCEDURE — 85384 FIBRINOGEN ACTIVITY: CPT

## 2025-08-08 PROCEDURE — 96365 THER/PROPH/DIAG IV INF INIT: CPT

## 2025-08-08 PROCEDURE — 81001 URINALYSIS AUTO W/SCOPE: CPT

## 2025-08-08 PROCEDURE — 30233R1 TRANSFUSION OF NONAUTOLOGOUS PLATELETS INTO PERIPHERAL VEIN, PERCUTANEOUS APPROACH: ICD-10-PCS | Performed by: HOSPITALIST

## 2025-08-08 PROCEDURE — 80053 COMPREHEN METABOLIC PANEL: CPT

## 2025-08-08 PROCEDURE — 83605 ASSAY OF LACTIC ACID: CPT

## 2025-08-08 PROCEDURE — 96366 THER/PROPH/DIAG IV INF ADDON: CPT

## 2025-08-08 PROCEDURE — 6360000002 HC RX W HCPCS: Performed by: REGISTERED NURSE

## 2025-08-08 PROCEDURE — 85610 PROTHROMBIN TIME: CPT

## 2025-08-08 PROCEDURE — 86850 RBC ANTIBODY SCREEN: CPT

## 2025-08-08 PROCEDURE — 71275 CT ANGIOGRAPHY CHEST: CPT

## 2025-08-08 PROCEDURE — 85025 COMPLETE CBC W/AUTO DIFF WBC: CPT

## 2025-08-08 RX ORDER — ONDANSETRON 2 MG/ML
4 INJECTION INTRAMUSCULAR; INTRAVENOUS EVERY 6 HOURS PRN
Status: DISCONTINUED | OUTPATIENT
Start: 2025-08-08 | End: 2025-08-15 | Stop reason: HOSPADM

## 2025-08-08 RX ORDER — ACETAMINOPHEN 650 MG/1
650 SUPPOSITORY RECTAL EVERY 6 HOURS PRN
Status: DISCONTINUED | OUTPATIENT
Start: 2025-08-08 | End: 2025-08-15 | Stop reason: HOSPADM

## 2025-08-08 RX ORDER — ENOXAPARIN SODIUM 100 MG/ML
40 INJECTION SUBCUTANEOUS DAILY
Status: DISCONTINUED | OUTPATIENT
Start: 2025-08-08 | End: 2025-08-08 | Stop reason: CLARIF

## 2025-08-08 RX ORDER — SODIUM CHLORIDE 0.9 % (FLUSH) 0.9 %
5-40 SYRINGE (ML) INJECTION EVERY 12 HOURS SCHEDULED
Status: DISCONTINUED | OUTPATIENT
Start: 2025-08-08 | End: 2025-08-15 | Stop reason: HOSPADM

## 2025-08-08 RX ORDER — IOPAMIDOL 755 MG/ML
75 INJECTION, SOLUTION INTRAVASCULAR
Status: COMPLETED | OUTPATIENT
Start: 2025-08-08 | End: 2025-08-08

## 2025-08-08 RX ORDER — SODIUM CHLORIDE 0.9 % (FLUSH) 0.9 %
5-40 SYRINGE (ML) INJECTION PRN
Status: DISCONTINUED | OUTPATIENT
Start: 2025-08-08 | End: 2025-08-15 | Stop reason: HOSPADM

## 2025-08-08 RX ORDER — HEPARIN SODIUM 1000 [USP'U]/ML
80 INJECTION, SOLUTION INTRAVENOUS; SUBCUTANEOUS PRN
Status: DISCONTINUED | OUTPATIENT
Start: 2025-08-08 | End: 2025-08-08

## 2025-08-08 RX ORDER — ONDANSETRON 4 MG/1
4 TABLET, ORALLY DISINTEGRATING ORAL EVERY 8 HOURS PRN
Status: DISCONTINUED | OUTPATIENT
Start: 2025-08-08 | End: 2025-08-15 | Stop reason: HOSPADM

## 2025-08-08 RX ORDER — POTASSIUM CHLORIDE 7.45 MG/ML
10 INJECTION INTRAVENOUS PRN
Status: DISCONTINUED | OUTPATIENT
Start: 2025-08-08 | End: 2025-08-15 | Stop reason: HOSPADM

## 2025-08-08 RX ORDER — MAGNESIUM SULFATE IN WATER 40 MG/ML
2000 INJECTION, SOLUTION INTRAVENOUS PRN
Status: DISCONTINUED | OUTPATIENT
Start: 2025-08-08 | End: 2025-08-15 | Stop reason: HOSPADM

## 2025-08-08 RX ORDER — HEPARIN SODIUM 1000 [USP'U]/ML
80 INJECTION, SOLUTION INTRAVENOUS; SUBCUTANEOUS ONCE
Status: DISCONTINUED | OUTPATIENT
Start: 2025-08-08 | End: 2025-08-08

## 2025-08-08 RX ORDER — HEPARIN SODIUM 1000 [USP'U]/ML
40 INJECTION, SOLUTION INTRAVENOUS; SUBCUTANEOUS PRN
Status: DISCONTINUED | OUTPATIENT
Start: 2025-08-08 | End: 2025-08-08

## 2025-08-08 RX ORDER — SODIUM CHLORIDE 9 MG/ML
INJECTION, SOLUTION INTRAVENOUS CONTINUOUS
Status: DISCONTINUED | OUTPATIENT
Start: 2025-08-08 | End: 2025-08-11

## 2025-08-08 RX ORDER — 0.9 % SODIUM CHLORIDE 0.9 %
1000 INTRAVENOUS SOLUTION INTRAVENOUS ONCE
Status: DISCONTINUED | OUTPATIENT
Start: 2025-08-08 | End: 2025-08-08

## 2025-08-08 RX ORDER — HEPARIN SODIUM 10000 [USP'U]/100ML
0-4000 INJECTION, SOLUTION INTRAVENOUS CONTINUOUS
Status: DISCONTINUED | OUTPATIENT
Start: 2025-08-08 | End: 2025-08-14

## 2025-08-08 RX ORDER — OLANZAPINE 10 MG/2ML
2.5 INJECTION, POWDER, FOR SOLUTION INTRAMUSCULAR
Status: COMPLETED | OUTPATIENT
Start: 2025-08-08 | End: 2025-08-08

## 2025-08-08 RX ORDER — SODIUM CHLORIDE 9 MG/ML
INJECTION, SOLUTION INTRAVENOUS PRN
Status: DISCONTINUED | OUTPATIENT
Start: 2025-08-08 | End: 2025-08-11

## 2025-08-08 RX ORDER — 0.9 % SODIUM CHLORIDE 0.9 %
1000 INTRAVENOUS SOLUTION INTRAVENOUS ONCE
Status: COMPLETED | OUTPATIENT
Start: 2025-08-08 | End: 2025-08-08

## 2025-08-08 RX ORDER — WATER 10 ML/10ML
INJECTION INTRAMUSCULAR; INTRAVENOUS; SUBCUTANEOUS
Status: COMPLETED
Start: 2025-08-08 | End: 2025-08-08

## 2025-08-08 RX ORDER — POTASSIUM CHLORIDE 1500 MG/1
40 TABLET, EXTENDED RELEASE ORAL PRN
Status: DISCONTINUED | OUTPATIENT
Start: 2025-08-08 | End: 2025-08-15 | Stop reason: HOSPADM

## 2025-08-08 RX ORDER — ENOXAPARIN SODIUM 100 MG/ML
40 INJECTION SUBCUTANEOUS DAILY
Status: DISCONTINUED | OUTPATIENT
Start: 2025-08-08 | End: 2025-08-08 | Stop reason: ALTCHOICE

## 2025-08-08 RX ORDER — HEPARIN SODIUM 1000 [USP'U]/ML
80 INJECTION, SOLUTION INTRAVENOUS; SUBCUTANEOUS ONCE
Status: DISCONTINUED | OUTPATIENT
Start: 2025-08-08 | End: 2025-08-11

## 2025-08-08 RX ORDER — POLYETHYLENE GLYCOL 3350 17 G/17G
17 POWDER, FOR SOLUTION ORAL DAILY PRN
Status: DISCONTINUED | OUTPATIENT
Start: 2025-08-08 | End: 2025-08-15 | Stop reason: HOSPADM

## 2025-08-08 RX ORDER — ACETAMINOPHEN 325 MG/1
650 TABLET ORAL EVERY 6 HOURS PRN
Status: DISCONTINUED | OUTPATIENT
Start: 2025-08-08 | End: 2025-08-15 | Stop reason: HOSPADM

## 2025-08-08 RX ORDER — POTASSIUM CHLORIDE 750 MG/1
10 TABLET, EXTENDED RELEASE ORAL DAILY
COMMUNITY

## 2025-08-08 RX ORDER — HEPARIN SODIUM 10000 [USP'U]/100ML
5-30 INJECTION, SOLUTION INTRAVENOUS CONTINUOUS
Status: DISCONTINUED | OUTPATIENT
Start: 2025-08-08 | End: 2025-08-08

## 2025-08-08 RX ADMIN — SODIUM CHLORIDE, PRESERVATIVE FREE 10 ML: 5 INJECTION INTRAVENOUS at 23:07

## 2025-08-08 RX ADMIN — WATER 20 ML: 1 INJECTION INTRAMUSCULAR; INTRAVENOUS; SUBCUTANEOUS at 16:53

## 2025-08-08 RX ADMIN — PANTOPRAZOLE SODIUM 80 MG: 40 INJECTION, POWDER, FOR SOLUTION INTRAVENOUS at 11:53

## 2025-08-08 RX ADMIN — PANTOPRAZOLE SODIUM 80 MG: 40 INJECTION, POWDER, FOR SOLUTION INTRAVENOUS at 23:10

## 2025-08-08 RX ADMIN — SODIUM CHLORIDE 1000 ML: 0.9 INJECTION, SOLUTION INTRAVENOUS at 11:55

## 2025-08-08 RX ADMIN — PANTOPRAZOLE SODIUM 8 MG/HR: 40 INJECTION, POWDER, FOR SOLUTION INTRAVENOUS at 11:54

## 2025-08-08 RX ADMIN — OLANZAPINE 2.5 MG: 10 INJECTION, POWDER, FOR SOLUTION INTRAMUSCULAR at 16:51

## 2025-08-08 RX ADMIN — IOPAMIDOL 75 ML: 755 INJECTION, SOLUTION INTRAVENOUS at 14:09

## 2025-08-08 RX ADMIN — SODIUM CHLORIDE 1000 ML: 0.9 INJECTION, SOLUTION INTRAVENOUS at 11:54

## 2025-08-08 ASSESSMENT — PAIN SCALES - GENERAL
PAINLEVEL_OUTOF10: 0
PAINLEVEL_OUTOF10: 0

## 2025-08-08 ASSESSMENT — LIFESTYLE VARIABLES
HOW OFTEN DO YOU HAVE A DRINK CONTAINING ALCOHOL: NEVER
HOW MANY STANDARD DRINKS CONTAINING ALCOHOL DO YOU HAVE ON A TYPICAL DAY: PATIENT DOES NOT DRINK

## 2025-08-09 ENCOUNTER — ANESTHESIA EVENT (OUTPATIENT)
Dept: ENDOSCOPY | Age: 80
End: 2025-08-09
Payer: MEDICARE

## 2025-08-09 ENCOUNTER — ANESTHESIA (OUTPATIENT)
Dept: ENDOSCOPY | Age: 80
End: 2025-08-09
Payer: MEDICARE

## 2025-08-09 PROBLEM — D62 ACUTE BLOOD LOSS ANEMIA: Status: ACTIVE | Noted: 2025-08-09

## 2025-08-09 LAB
ALBUMIN SERPL-MCNC: 3 G/DL (ref 3.4–5)
ALBUMIN/GLOB SERPL: 1.8 {RATIO} (ref 1.1–2.2)
ALP SERPL-CCNC: 98 U/L (ref 40–129)
ALT SERPL-CCNC: 21 U/L (ref 10–40)
ANION GAP SERPL CALCULATED.3IONS-SCNC: 11 MMOL/L (ref 3–16)
ANION GAP SERPL CALCULATED.3IONS-SCNC: 7 MMOL/L (ref 3–16)
AST SERPL-CCNC: 25 U/L (ref 15–37)
BASOPHILS # BLD: 0 K/UL (ref 0–0.2)
BASOPHILS # BLD: 0.1 K/UL (ref 0–0.2)
BASOPHILS NFR BLD: 0.3 %
BASOPHILS NFR BLD: 0.5 %
BILIRUB SERPL-MCNC: 0.6 MG/DL (ref 0–1)
BLOOD BANK DISPENSE STATUS: NORMAL
BLOOD BANK PRODUCT CODE: NORMAL
BPU ID: NORMAL
BUN SERPL-MCNC: 27 MG/DL (ref 7–20)
BUN SERPL-MCNC: 29 MG/DL (ref 7–20)
CALCIUM SERPL-MCNC: 7.3 MG/DL (ref 8.3–10.6)
CALCIUM SERPL-MCNC: 8 MG/DL (ref 8.3–10.6)
CHLORIDE SERPL-SCNC: 108 MMOL/L (ref 99–110)
CHLORIDE SERPL-SCNC: 111 MMOL/L (ref 99–110)
CLOSURE TME BLD-IMP: ABNORMAL
CLOSURE TME COLL+ADP BLD: 87 SEC (ref 56–110)
CLOSURE TME COLL+EPINEP BLD: >300 SEC (ref 86–194)
CO2 SERPL-SCNC: 21 MMOL/L (ref 21–32)
CO2 SERPL-SCNC: 23 MMOL/L (ref 21–32)
CREAT SERPL-MCNC: 0.8 MG/DL (ref 0.6–1.2)
CREAT SERPL-MCNC: 0.8 MG/DL (ref 0.6–1.2)
DEPRECATED RDW RBC AUTO: 14.4 % (ref 12.4–15.4)
DEPRECATED RDW RBC AUTO: 17.8 % (ref 12.4–15.4)
DESCRIPTION BLOOD BANK: NORMAL
EOSINOPHIL # BLD: 0 K/UL (ref 0–0.6)
EOSINOPHIL # BLD: 0 K/UL (ref 0–0.6)
EOSINOPHIL NFR BLD: 0 %
EOSINOPHIL NFR BLD: 0 %
FIBRINOGEN PPP-MCNC: 194 MG/DL (ref 220–516)
GFR SERPLBLD CREATININE-BSD FMLA CKD-EPI: 75 ML/MIN/{1.73_M2}
GFR SERPLBLD CREATININE-BSD FMLA CKD-EPI: 75 ML/MIN/{1.73_M2}
GLUCOSE SERPL-MCNC: 118 MG/DL (ref 70–99)
GLUCOSE SERPL-MCNC: 141 MG/DL (ref 70–99)
HCT VFR BLD AUTO: 15.4 % (ref 36–48)
HCT VFR BLD AUTO: 21.1 % (ref 36–48)
HCT VFR BLD AUTO: 23 % (ref 36–48)
HCT VFR BLD AUTO: 25 % (ref 36–48)
HGB BLD-MCNC: 5.2 G/DL (ref 12–16)
HGB BLD-MCNC: 7.2 G/DL (ref 12–16)
HGB BLD-MCNC: 7.6 G/DL (ref 12–16)
HGB BLD-MCNC: 8.2 G/DL (ref 12–16)
INR PPP: 1.21 (ref 0.86–1.14)
LYMPHOCYTES # BLD: 0.8 K/UL (ref 1–5.1)
LYMPHOCYTES # BLD: 1.1 K/UL (ref 1–5.1)
LYMPHOCYTES NFR BLD: 5.7 %
LYMPHOCYTES NFR BLD: 9.5 %
MAGNESIUM SERPL-MCNC: 1.52 MG/DL (ref 1.8–2.4)
MCH RBC QN AUTO: 28.9 PG (ref 26–34)
MCH RBC QN AUTO: 30.5 PG (ref 26–34)
MCHC RBC AUTO-ENTMCNC: 32.8 G/DL (ref 31–36)
MCHC RBC AUTO-ENTMCNC: 32.8 G/DL (ref 31–36)
MCV RBC AUTO: 88.1 FL (ref 80–100)
MCV RBC AUTO: 92.9 FL (ref 80–100)
MONOCYTES # BLD: 0.5 K/UL (ref 0–1.3)
MONOCYTES # BLD: 0.6 K/UL (ref 0–1.3)
MONOCYTES NFR BLD: 4.3 %
MONOCYTES NFR BLD: 4.3 %
NEUTROPHILS # BLD: 12.9 K/UL (ref 1.7–7.7)
NEUTROPHILS # BLD: 9.5 K/UL (ref 1.7–7.7)
NEUTROPHILS NFR BLD: 85.9 %
NEUTROPHILS NFR BLD: 89.5 %
PLATELET # BLD AUTO: 187 K/UL (ref 135–450)
PLATELET # BLD AUTO: 190 K/UL (ref 135–450)
PMV BLD AUTO: 8.6 FL (ref 5–10.5)
PMV BLD AUTO: 8.9 FL (ref 5–10.5)
POTASSIUM SERPL-SCNC: 3.9 MMOL/L (ref 3.5–5.1)
POTASSIUM SERPL-SCNC: 4 MMOL/L (ref 3.5–5.1)
PROT SERPL-MCNC: 4.7 G/DL (ref 6.4–8.2)
PROTHROMBIN TIME: 15.6 SEC (ref 12.1–14.9)
RBC # BLD AUTO: 2.48 M/UL (ref 4–5.2)
RBC # BLD AUTO: 2.84 M/UL (ref 4–5.2)
SODIUM SERPL-SCNC: 140 MMOL/L (ref 136–145)
SODIUM SERPL-SCNC: 141 MMOL/L (ref 136–145)
WBC # BLD AUTO: 11.1 K/UL (ref 4–11)
WBC # BLD AUTO: 14.5 K/UL (ref 4–11)

## 2025-08-09 PROCEDURE — 6360000002 HC RX W HCPCS: Performed by: HOSPITALIST

## 2025-08-09 PROCEDURE — 2580000003 HC RX 258: Performed by: HOSPITALIST

## 2025-08-09 PROCEDURE — 51702 INSERT TEMP BLADDER CATH: CPT

## 2025-08-09 PROCEDURE — C1889 IMPLANT/INSERT DEVICE, NOC: HCPCS | Performed by: INTERNAL MEDICINE

## 2025-08-09 PROCEDURE — 6360000002 HC RX W HCPCS: Performed by: INTERNAL MEDICINE

## 2025-08-09 PROCEDURE — 2709999900 HC NON-CHARGEABLE SUPPLY: Performed by: INTERNAL MEDICINE

## 2025-08-09 PROCEDURE — 2100000000 HC CCU R&B

## 2025-08-09 PROCEDURE — 6360000002 HC RX W HCPCS: Performed by: REGISTERED NURSE

## 2025-08-09 PROCEDURE — 3609013800 HC EGD SUBMUCOSAL/BOTOX INJECTION: Performed by: INTERNAL MEDICINE

## 2025-08-09 PROCEDURE — 80048 BASIC METABOLIC PNL TOTAL CA: CPT

## 2025-08-09 PROCEDURE — 3700000000 HC ANESTHESIA ATTENDED CARE: Performed by: INTERNAL MEDICINE

## 2025-08-09 PROCEDURE — 3700000001 HC ADD 15 MINUTES (ANESTHESIA): Performed by: INTERNAL MEDICINE

## 2025-08-09 PROCEDURE — 36430 TRANSFUSION BLD/BLD COMPNT: CPT

## 2025-08-09 PROCEDURE — 51798 US URINE CAPACITY MEASURE: CPT

## 2025-08-09 PROCEDURE — 85014 HEMATOCRIT: CPT

## 2025-08-09 PROCEDURE — 6370000000 HC RX 637 (ALT 250 FOR IP): Performed by: INTERNAL MEDICINE

## 2025-08-09 PROCEDURE — 99223 1ST HOSP IP/OBS HIGH 75: CPT | Performed by: INTERNAL MEDICINE

## 2025-08-09 PROCEDURE — 2580000003 HC RX 258: Performed by: ANESTHESIOLOGY

## 2025-08-09 PROCEDURE — 6360000002 HC RX W HCPCS: Performed by: ANESTHESIOLOGY

## 2025-08-09 PROCEDURE — 36415 COLL VENOUS BLD VENIPUNCTURE: CPT

## 2025-08-09 PROCEDURE — 3E0G8GC INTRODUCTION OF OTHER THERAPEUTIC SUBSTANCE INTO UPPER GI, VIA NATURAL OR ARTIFICIAL OPENING ENDOSCOPIC: ICD-10-PCS | Performed by: HOSPITALIST

## 2025-08-09 PROCEDURE — 2580000003 HC RX 258: Performed by: REGISTERED NURSE

## 2025-08-09 PROCEDURE — 85025 COMPLETE CBC W/AUTO DIFF WBC: CPT

## 2025-08-09 PROCEDURE — 0W3P8ZZ CONTROL BLEEDING IN GASTROINTESTINAL TRACT, VIA NATURAL OR ARTIFICIAL OPENING ENDOSCOPIC: ICD-10-PCS | Performed by: HOSPITALIST

## 2025-08-09 PROCEDURE — 3609013000 HC EGD TRANSORAL CONTROL BLEEDING ANY METHOD: Performed by: INTERNAL MEDICINE

## 2025-08-09 PROCEDURE — P9047 ALBUMIN (HUMAN), 25%, 50ML: HCPCS | Performed by: REGISTERED NURSE

## 2025-08-09 PROCEDURE — 94760 N-INVAS EAR/PLS OXIMETRY 1: CPT

## 2025-08-09 PROCEDURE — 85018 HEMOGLOBIN: CPT

## 2025-08-09 DEVICE — CLIP ENDOSCP 235CM RESOL 360 ORDER UOM IS EACH: Type: IMPLANTABLE DEVICE | Site: DUODENUM | Status: FUNCTIONAL

## 2025-08-09 RX ORDER — ONDANSETRON 2 MG/ML
4 INJECTION INTRAMUSCULAR; INTRAVENOUS
Status: CANCELLED | OUTPATIENT
Start: 2025-08-09

## 2025-08-09 RX ORDER — LIDOCAINE HYDROCHLORIDE 20 MG/ML
INJECTION, SOLUTION EPIDURAL; INFILTRATION; INTRACAUDAL; PERINEURAL
Status: DISCONTINUED | OUTPATIENT
Start: 2025-08-09 | End: 2025-08-09 | Stop reason: SDUPTHER

## 2025-08-09 RX ORDER — DROPERIDOL 2.5 MG/ML
0.62 INJECTION, SOLUTION INTRAMUSCULAR; INTRAVENOUS
Status: CANCELLED | OUTPATIENT
Start: 2025-08-09

## 2025-08-09 RX ORDER — SIMETHICONE 40MG/0.6ML
SUSPENSION, DROPS(FINAL DOSAGE FORM)(ML) ORAL PRN
Status: DISCONTINUED | OUTPATIENT
Start: 2025-08-09 | End: 2025-08-09 | Stop reason: ALTCHOICE

## 2025-08-09 RX ORDER — SODIUM CHLORIDE 9 MG/ML
INJECTION, SOLUTION INTRAVENOUS PRN
Status: DISCONTINUED | OUTPATIENT
Start: 2025-08-09 | End: 2025-08-11

## 2025-08-09 RX ORDER — PROPOFOL 10 MG/ML
INJECTION, EMULSION INTRAVENOUS
Status: DISCONTINUED | OUTPATIENT
Start: 2025-08-09 | End: 2025-08-09 | Stop reason: SDUPTHER

## 2025-08-09 RX ORDER — ALBUMIN (HUMAN) 12.5 G/50ML
25 SOLUTION INTRAVENOUS
Status: COMPLETED | OUTPATIENT
Start: 2025-08-09 | End: 2025-08-09

## 2025-08-09 RX ORDER — EPINEPHRINE 1 MG/ML
INJECTION, SOLUTION, CONCENTRATE INTRAVENOUS PRN
Status: DISCONTINUED | OUTPATIENT
Start: 2025-08-09 | End: 2025-08-09 | Stop reason: ALTCHOICE

## 2025-08-09 RX ORDER — SODIUM CHLORIDE 0.9 % (FLUSH) 0.9 %
5-40 SYRINGE (ML) INJECTION EVERY 12 HOURS SCHEDULED
Status: CANCELLED | OUTPATIENT
Start: 2025-08-09

## 2025-08-09 RX ORDER — LAMOTRIGINE 25 MG/1
25 TABLET ORAL NIGHTLY
Status: DISCONTINUED | OUTPATIENT
Start: 2025-08-09 | End: 2025-08-15 | Stop reason: HOSPADM

## 2025-08-09 RX ORDER — SODIUM CHLORIDE 9 MG/ML
INJECTION, SOLUTION INTRAVENOUS
Status: DISCONTINUED | OUTPATIENT
Start: 2025-08-09 | End: 2025-08-09 | Stop reason: SDUPTHER

## 2025-08-09 RX ORDER — ALBUMIN (HUMAN) 12.5 G/50ML
25 SOLUTION INTRAVENOUS ONCE
Status: DISCONTINUED | OUTPATIENT
Start: 2025-08-09 | End: 2025-08-09

## 2025-08-09 RX ORDER — SODIUM CHLORIDE 0.9 % (FLUSH) 0.9 %
5-40 SYRINGE (ML) INJECTION PRN
Status: CANCELLED | OUTPATIENT
Start: 2025-08-09

## 2025-08-09 RX ORDER — BACLOFEN 10 MG/1
10 TABLET ORAL NIGHTLY
Status: DISCONTINUED | OUTPATIENT
Start: 2025-08-09 | End: 2025-08-15 | Stop reason: HOSPADM

## 2025-08-09 RX ORDER — SODIUM CHLORIDE 9 MG/ML
INJECTION, SOLUTION INTRAVENOUS PRN
Status: CANCELLED | OUTPATIENT
Start: 2025-08-09

## 2025-08-09 RX ADMIN — PANTOPRAZOLE SODIUM 8 MG/HR: 40 INJECTION, POWDER, FOR SOLUTION INTRAVENOUS at 09:41

## 2025-08-09 RX ADMIN — SODIUM CHLORIDE: 9 INJECTION, SOLUTION INTRAVENOUS at 10:47

## 2025-08-09 RX ADMIN — LIDOCAINE HYDROCHLORIDE 60 MG: 20 INJECTION, SOLUTION EPIDURAL; INFILTRATION; INTRACAUDAL; PERINEURAL at 10:50

## 2025-08-09 RX ADMIN — PANTOPRAZOLE SODIUM 8 MG/HR: 40 INJECTION, POWDER, FOR SOLUTION INTRAVENOUS at 19:06

## 2025-08-09 RX ADMIN — PHENYLEPHRINE HYDROCHLORIDE 200 MCG: 10 INJECTION INTRAVENOUS at 10:54

## 2025-08-09 RX ADMIN — PHENYLEPHRINE HYDROCHLORIDE 100 MCG: 10 INJECTION INTRAVENOUS at 10:53

## 2025-08-09 RX ADMIN — SERTRALINE 75 MG: 50 TABLET, FILM COATED ORAL at 14:08

## 2025-08-09 RX ADMIN — SODIUM CHLORIDE: 0.9 INJECTION, SOLUTION INTRAVENOUS at 10:33

## 2025-08-09 RX ADMIN — SODIUM CHLORIDE: 0.9 INJECTION, SOLUTION INTRAVENOUS at 00:21

## 2025-08-09 RX ADMIN — MAGNESIUM SULFATE HEPTAHYDRATE 2000 MG: 40 INJECTION, SOLUTION INTRAVENOUS at 02:42

## 2025-08-09 RX ADMIN — LAMOTRIGINE 25 MG: 25 TABLET ORAL at 20:57

## 2025-08-09 RX ADMIN — SODIUM CHLORIDE: 0.9 INJECTION, SOLUTION INTRAVENOUS at 02:41

## 2025-08-09 RX ADMIN — ALBUMIN (HUMAN) 25 G: 0.25 INJECTION, SOLUTION INTRAVENOUS at 14:16

## 2025-08-09 RX ADMIN — PROPOFOL 50 MG: 10 INJECTION, EMULSION INTRAVENOUS at 10:50

## 2025-08-09 RX ADMIN — PROPOFOL 150 MCG/KG/MIN: 10 INJECTION, EMULSION INTRAVENOUS at 10:51

## 2025-08-09 RX ADMIN — PANTOPRAZOLE SODIUM 8 MG/HR: 40 INJECTION, POWDER, FOR SOLUTION INTRAVENOUS at 00:28

## 2025-08-09 RX ADMIN — SODIUM CHLORIDE: 0.9 INJECTION, SOLUTION INTRAVENOUS at 19:59

## 2025-08-09 RX ADMIN — BACLOFEN 10 MG: 10 TABLET ORAL at 20:57

## 2025-08-09 ASSESSMENT — PAIN SCALES - GENERAL
PAINLEVEL_OUTOF10: 1
PAINLEVEL_OUTOF10: 0

## 2025-08-09 ASSESSMENT — PAIN DESCRIPTION - LOCATION: LOCATION: GENERALIZED

## 2025-08-09 ASSESSMENT — PAIN DESCRIPTION - DESCRIPTORS: DESCRIPTORS: ACHING

## 2025-08-09 ASSESSMENT — PAIN - FUNCTIONAL ASSESSMENT: PAIN_FUNCTIONAL_ASSESSMENT: 0-10

## 2025-08-10 ENCOUNTER — APPOINTMENT (OUTPATIENT)
Dept: VASCULAR LAB | Age: 80
End: 2025-08-10
Attending: INTERNAL MEDICINE
Payer: MEDICARE

## 2025-08-10 LAB
ANION GAP SERPL CALCULATED.3IONS-SCNC: 6 MMOL/L (ref 3–16)
BASOPHILS # BLD: 0.1 K/UL (ref 0–0.2)
BASOPHILS NFR BLD: 0.6 %
BUN SERPL-MCNC: 27 MG/DL (ref 7–20)
CALCIUM SERPL-MCNC: 7.7 MG/DL (ref 8.3–10.6)
CHLORIDE SERPL-SCNC: 111 MMOL/L (ref 99–110)
CO2 SERPL-SCNC: 21 MMOL/L (ref 21–32)
CREAT SERPL-MCNC: 0.7 MG/DL (ref 0.6–1.2)
DEPRECATED RDW RBC AUTO: 17.1 % (ref 12.4–15.4)
EOSINOPHIL # BLD: 0.1 K/UL (ref 0–0.6)
EOSINOPHIL NFR BLD: 0.6 %
GFR SERPLBLD CREATININE-BSD FMLA CKD-EPI: 88 ML/MIN/{1.73_M2}
GLUCOSE SERPL-MCNC: 93 MG/DL (ref 70–99)
HCT VFR BLD AUTO: 20.3 % (ref 36–48)
HCT VFR BLD AUTO: 21.4 % (ref 36–48)
HCT VFR BLD AUTO: 22 % (ref 36–48)
HGB BLD-MCNC: 7.2 G/DL (ref 12–16)
HGB BLD-MCNC: 7.2 G/DL (ref 12–16)
HGB BLD-MCNC: 7.6 G/DL (ref 12–16)
LYMPHOCYTES # BLD: 2.8 K/UL (ref 1–5.1)
LYMPHOCYTES NFR BLD: 27.3 %
MCH RBC QN AUTO: 30.8 PG (ref 26–34)
MCHC RBC AUTO-ENTMCNC: 35.3 G/DL (ref 31–36)
MCV RBC AUTO: 87.3 FL (ref 80–100)
MONOCYTES # BLD: 0.8 K/UL (ref 0–1.3)
MONOCYTES NFR BLD: 7.7 %
NEUTROPHILS # BLD: 6.5 K/UL (ref 1.7–7.7)
NEUTROPHILS NFR BLD: 63.8 %
PLATELET # BLD AUTO: 125 K/UL (ref 135–450)
PMV BLD AUTO: 8.4 FL (ref 5–10.5)
POTASSIUM SERPL-SCNC: 3.3 MMOL/L (ref 3.5–5.1)
POTASSIUM SERPL-SCNC: 3.9 MMOL/L (ref 3.5–5.1)
RBC # BLD AUTO: 2.32 M/UL (ref 4–5.2)
SODIUM SERPL-SCNC: 138 MMOL/L (ref 136–145)
WBC # BLD AUTO: 10.2 K/UL (ref 4–11)

## 2025-08-10 PROCEDURE — 2580000003 HC RX 258: Performed by: INTERNAL MEDICINE

## 2025-08-10 PROCEDURE — 6370000000 HC RX 637 (ALT 250 FOR IP): Performed by: INTERNAL MEDICINE

## 2025-08-10 PROCEDURE — 84132 ASSAY OF SERUM POTASSIUM: CPT

## 2025-08-10 PROCEDURE — 2700000000 HC OXYGEN THERAPY PER DAY

## 2025-08-10 PROCEDURE — 94761 N-INVAS EAR/PLS OXIMETRY MLT: CPT

## 2025-08-10 PROCEDURE — 99233 SBSQ HOSP IP/OBS HIGH 50: CPT | Performed by: INTERNAL MEDICINE

## 2025-08-10 PROCEDURE — 93970 EXTREMITY STUDY: CPT

## 2025-08-10 PROCEDURE — 6360000002 HC RX W HCPCS: Performed by: INTERNAL MEDICINE

## 2025-08-10 PROCEDURE — 36415 COLL VENOUS BLD VENIPUNCTURE: CPT

## 2025-08-10 PROCEDURE — 85014 HEMATOCRIT: CPT

## 2025-08-10 PROCEDURE — 85018 HEMOGLOBIN: CPT

## 2025-08-10 PROCEDURE — 85025 COMPLETE CBC W/AUTO DIFF WBC: CPT

## 2025-08-10 PROCEDURE — 2100000000 HC CCU R&B

## 2025-08-10 PROCEDURE — 80048 BASIC METABOLIC PNL TOTAL CA: CPT

## 2025-08-10 RX ADMIN — LAMOTRIGINE 25 MG: 25 TABLET ORAL at 20:22

## 2025-08-10 RX ADMIN — PANTOPRAZOLE SODIUM 8 MG/HR: 40 INJECTION, POWDER, FOR SOLUTION INTRAVENOUS at 06:25

## 2025-08-10 RX ADMIN — BACLOFEN 10 MG: 10 TABLET ORAL at 20:20

## 2025-08-10 RX ADMIN — SODIUM CHLORIDE: 0.9 INJECTION, SOLUTION INTRAVENOUS at 06:24

## 2025-08-10 RX ADMIN — PANTOPRAZOLE SODIUM 8 MG/HR: 40 INJECTION, POWDER, FOR SOLUTION INTRAVENOUS at 17:04

## 2025-08-10 RX ADMIN — POTASSIUM BICARBONATE 40 MEQ: 782 TABLET, EFFERVESCENT ORAL at 06:11

## 2025-08-10 RX ADMIN — SERTRALINE 75 MG: 50 TABLET, FILM COATED ORAL at 09:04

## 2025-08-10 RX ADMIN — SODIUM CHLORIDE: 0.9 INJECTION, SOLUTION INTRAVENOUS at 17:04

## 2025-08-10 ASSESSMENT — PAIN SCALES - GENERAL
PAINLEVEL_OUTOF10: 0

## 2025-08-11 LAB
ANION GAP SERPL CALCULATED.3IONS-SCNC: 9 MMOL/L (ref 3–16)
BASOPHILS # BLD: 0.1 K/UL (ref 0–0.2)
BASOPHILS NFR BLD: 0.6 %
BUN SERPL-MCNC: 12 MG/DL (ref 7–20)
CALCIUM SERPL-MCNC: 8.1 MG/DL (ref 8.3–10.6)
CHLORIDE SERPL-SCNC: 109 MMOL/L (ref 99–110)
CO2 SERPL-SCNC: 24 MMOL/L (ref 21–32)
CREAT SERPL-MCNC: 0.6 MG/DL (ref 0.6–1.2)
DEPRECATED RDW RBC AUTO: 16.9 % (ref 12.4–15.4)
ECHO BSA: 1.7 M2
EOSINOPHIL # BLD: 0.1 K/UL (ref 0–0.6)
EOSINOPHIL NFR BLD: 1.1 %
GFR SERPLBLD CREATININE-BSD FMLA CKD-EPI: >90 ML/MIN/{1.73_M2}
GLUCOSE SERPL-MCNC: 95 MG/DL (ref 70–99)
HCT VFR BLD AUTO: 23 % (ref 36–48)
HCT VFR BLD AUTO: 24.5 % (ref 36–48)
HCT VFR BLD AUTO: 24.7 % (ref 36–48)
HGB BLD-MCNC: 7.9 G/DL (ref 12–16)
HGB BLD-MCNC: 8.1 G/DL (ref 12–16)
HGB BLD-MCNC: 8.5 G/DL (ref 12–16)
LYMPHOCYTES # BLD: 1.1 K/UL (ref 1–5.1)
LYMPHOCYTES NFR BLD: 12.1 %
MAGNESIUM SERPL-MCNC: 1.66 MG/DL (ref 1.8–2.4)
MCH RBC QN AUTO: 30.2 PG (ref 26–34)
MCHC RBC AUTO-ENTMCNC: 34.1 G/DL (ref 31–36)
MCV RBC AUTO: 88.4 FL (ref 80–100)
MONOCYTES # BLD: 0.6 K/UL (ref 0–1.3)
MONOCYTES NFR BLD: 7.3 %
NEUTROPHILS # BLD: 7 K/UL (ref 1.7–7.7)
NEUTROPHILS NFR BLD: 78.9 %
PHOSPHATE SERPL-MCNC: 1.8 MG/DL (ref 2.5–4.9)
PLATELET # BLD AUTO: 140 K/UL (ref 135–450)
PMV BLD AUTO: 8.5 FL (ref 5–10.5)
POTASSIUM SERPL-SCNC: 3.5 MMOL/L (ref 3.5–5.1)
RBC # BLD AUTO: 2.6 M/UL (ref 4–5.2)
SODIUM SERPL-SCNC: 142 MMOL/L (ref 136–145)
WBC # BLD AUTO: 8.9 K/UL (ref 4–11)

## 2025-08-11 PROCEDURE — 2580000003 HC RX 258: Performed by: NURSE PRACTITIONER

## 2025-08-11 PROCEDURE — 6370000000 HC RX 637 (ALT 250 FOR IP): Performed by: REGISTERED NURSE

## 2025-08-11 PROCEDURE — 2500000003 HC RX 250 WO HCPCS: Performed by: INTERNAL MEDICINE

## 2025-08-11 PROCEDURE — 2500000003 HC RX 250 WO HCPCS: Performed by: NURSE PRACTITIONER

## 2025-08-11 PROCEDURE — 6370000000 HC RX 637 (ALT 250 FOR IP): Performed by: NURSE PRACTITIONER

## 2025-08-11 PROCEDURE — 6360000002 HC RX W HCPCS: Performed by: NURSE PRACTITIONER

## 2025-08-11 PROCEDURE — 6370000000 HC RX 637 (ALT 250 FOR IP): Performed by: INTERNAL MEDICINE

## 2025-08-11 PROCEDURE — 94761 N-INVAS EAR/PLS OXIMETRY MLT: CPT

## 2025-08-11 PROCEDURE — 83735 ASSAY OF MAGNESIUM: CPT

## 2025-08-11 PROCEDURE — 80048 BASIC METABOLIC PNL TOTAL CA: CPT

## 2025-08-11 PROCEDURE — 6360000002 HC RX W HCPCS: Performed by: INTERNAL MEDICINE

## 2025-08-11 PROCEDURE — 99233 SBSQ HOSP IP/OBS HIGH 50: CPT | Performed by: INTERNAL MEDICINE

## 2025-08-11 PROCEDURE — 93970 EXTREMITY STUDY: CPT | Performed by: SURGERY

## 2025-08-11 PROCEDURE — 6360000002 HC RX W HCPCS: Performed by: REGISTERED NURSE

## 2025-08-11 PROCEDURE — 36415 COLL VENOUS BLD VENIPUNCTURE: CPT

## 2025-08-11 PROCEDURE — 2060000000 HC ICU INTERMEDIATE R&B

## 2025-08-11 PROCEDURE — 85025 COMPLETE CBC W/AUTO DIFF WBC: CPT

## 2025-08-11 PROCEDURE — 2580000003 HC RX 258: Performed by: INTERNAL MEDICINE

## 2025-08-11 PROCEDURE — 2700000000 HC OXYGEN THERAPY PER DAY

## 2025-08-11 PROCEDURE — APPNB15 APP NON BILLABLE TIME 0-15 MINS: Performed by: NURSE PRACTITIONER

## 2025-08-11 PROCEDURE — 84100 ASSAY OF PHOSPHORUS: CPT

## 2025-08-11 PROCEDURE — 85018 HEMOGLOBIN: CPT

## 2025-08-11 PROCEDURE — 85014 HEMATOCRIT: CPT

## 2025-08-11 RX ORDER — CLONIDINE HYDROCHLORIDE 0.1 MG/1
0.1 TABLET ORAL EVERY 8 HOURS PRN
Status: DISCONTINUED | OUTPATIENT
Start: 2025-08-11 | End: 2025-08-11

## 2025-08-11 RX ORDER — METOPROLOL TARTRATE 1 MG/ML
5 INJECTION, SOLUTION INTRAVENOUS ONCE
Status: COMPLETED | OUTPATIENT
Start: 2025-08-11 | End: 2025-08-11

## 2025-08-11 RX ORDER — CALCIUM GLUCONATE 20 MG/ML
1000 INJECTION, SOLUTION INTRAVENOUS ONCE
Status: COMPLETED | OUTPATIENT
Start: 2025-08-11 | End: 2025-08-11

## 2025-08-11 RX ORDER — HYDROXYZINE PAMOATE 25 MG/1
25 CAPSULE ORAL EVERY 8 HOURS PRN
Status: DISCONTINUED | OUTPATIENT
Start: 2025-08-11 | End: 2025-08-15 | Stop reason: HOSPADM

## 2025-08-11 RX ORDER — CLONIDINE HYDROCHLORIDE 0.1 MG/1
0.1 TABLET ORAL 3 TIMES DAILY
Status: DISCONTINUED | OUTPATIENT
Start: 2025-08-11 | End: 2025-08-15 | Stop reason: HOSPADM

## 2025-08-11 RX ORDER — MAGNESIUM SULFATE IN WATER 40 MG/ML
4000 INJECTION, SOLUTION INTRAVENOUS ONCE
Status: COMPLETED | OUTPATIENT
Start: 2025-08-11 | End: 2025-08-11

## 2025-08-11 RX ADMIN — SODIUM CHLORIDE, PRESERVATIVE FREE 10 ML: 5 INJECTION INTRAVENOUS at 20:53

## 2025-08-11 RX ADMIN — PANTOPRAZOLE SODIUM 8 MG/HR: 40 INJECTION, POWDER, FOR SOLUTION INTRAVENOUS at 00:22

## 2025-08-11 RX ADMIN — POTASSIUM PHOSPHATE, MONOBASIC POTASSIUM PHOSPHATE, DIBASIC 20 MMOL: 224; 236 INJECTION, SOLUTION, CONCENTRATE INTRAVENOUS at 10:51

## 2025-08-11 RX ADMIN — HYDROXYZINE PAMOATE 25 MG: 25 CAPSULE ORAL at 01:48

## 2025-08-11 RX ADMIN — LAMOTRIGINE 25 MG: 25 TABLET ORAL at 20:52

## 2025-08-11 RX ADMIN — CLONIDINE HYDROCHLORIDE 0.1 MG: 0.1 TABLET ORAL at 20:51

## 2025-08-11 RX ADMIN — CALCIUM GLUCONATE 1000 MG: 20 INJECTION, SOLUTION INTRAVENOUS at 08:33

## 2025-08-11 RX ADMIN — BACLOFEN 10 MG: 10 TABLET ORAL at 20:51

## 2025-08-11 RX ADMIN — HYDROXYZINE PAMOATE 25 MG: 25 CAPSULE ORAL at 13:44

## 2025-08-11 RX ADMIN — SODIUM CHLORIDE: 0.9 INJECTION, SOLUTION INTRAVENOUS at 03:31

## 2025-08-11 RX ADMIN — MAGNESIUM SULFATE HEPTAHYDRATE 4000 MG: 40 INJECTION, SOLUTION INTRAVENOUS at 10:43

## 2025-08-11 RX ADMIN — METOPROLOL TARTRATE 5 MG: 5 INJECTION INTRAVENOUS at 01:48

## 2025-08-11 RX ADMIN — PANTOPRAZOLE SODIUM 8 MG/HR: 40 INJECTION, POWDER, FOR SOLUTION INTRAVENOUS at 12:06

## 2025-08-11 RX ADMIN — CLONIDINE HYDROCHLORIDE 0.1 MG: 0.1 TABLET ORAL at 13:44

## 2025-08-11 ASSESSMENT — PAIN SCALES - PAIN ASSESSMENT IN ADVANCED DEMENTIA (PAINAD)
BODYLANGUAGE: TENSE, DISTRESSED PACING, FIDGETING
BREATHING: NORMAL
BODYLANGUAGE: RIGID, FISTS CLENCHED, KNEES UP, PUSHING/PULLING AWAY, STRIKES OUT
CONSOLABILITY: DISTRACTED OR REASSURED BY VOICE/TOUCH
TOTALSCORE: 7
BREATHING: NORMAL
TOTALSCORE: 2
FACIALEXPRESSION: SAD, FRIGHTENED, FROWN
NEGVOCALIZATION: REPEATED TROUBLED CALLING OUT, LOUD MOANING/GROANING, CRYING
CONSOLABILITY: UNABLE TO CONSOLE, DISTRACT OR REASSURE
FACIALEXPRESSION: SMILING OR INEXPRESSIVE

## 2025-08-11 ASSESSMENT — PAIN SCALES - GENERAL
PAINLEVEL_OUTOF10: 0
PAINLEVEL_OUTOF10: 7
PAINLEVEL_OUTOF10: 0
PAINLEVEL_OUTOF10: 2
PAINLEVEL_OUTOF10: 0

## 2025-08-12 LAB
ANION GAP SERPL CALCULATED.3IONS-SCNC: 10 MMOL/L (ref 3–16)
BASOPHILS # BLD: 0.1 K/UL (ref 0–0.2)
BASOPHILS NFR BLD: 0.8 %
BUN SERPL-MCNC: 11 MG/DL (ref 7–20)
CALCIUM SERPL-MCNC: 8.5 MG/DL (ref 8.3–10.6)
CHLORIDE SERPL-SCNC: 103 MMOL/L (ref 99–110)
CO2 SERPL-SCNC: 25 MMOL/L (ref 21–32)
CREAT SERPL-MCNC: 0.7 MG/DL (ref 0.6–1.2)
DEPRECATED RDW RBC AUTO: 17 % (ref 12.4–15.4)
EOSINOPHIL # BLD: 0.1 K/UL (ref 0–0.6)
EOSINOPHIL NFR BLD: 1.1 %
GFR SERPLBLD CREATININE-BSD FMLA CKD-EPI: 88 ML/MIN/{1.73_M2}
GLUCOSE SERPL-MCNC: 105 MG/DL (ref 70–99)
HCT VFR BLD AUTO: 24.7 % (ref 36–48)
HCT VFR BLD AUTO: 28.5 % (ref 36–48)
HGB BLD-MCNC: 8.6 G/DL (ref 12–16)
HGB BLD-MCNC: 9.6 G/DL (ref 12–16)
LYMPHOCYTES # BLD: 1.4 K/UL (ref 1–5.1)
LYMPHOCYTES NFR BLD: 18.7 %
MAGNESIUM SERPL-MCNC: 2.51 MG/DL (ref 1.8–2.4)
MCH RBC QN AUTO: 30.7 PG (ref 26–34)
MCHC RBC AUTO-ENTMCNC: 34.8 G/DL (ref 31–36)
MCV RBC AUTO: 88.4 FL (ref 80–100)
MONOCYTES # BLD: 0.5 K/UL (ref 0–1.3)
MONOCYTES NFR BLD: 7.2 %
NEUTROPHILS # BLD: 5.5 K/UL (ref 1.7–7.7)
NEUTROPHILS NFR BLD: 72.2 %
PHOSPHATE SERPL-MCNC: 3.4 MG/DL (ref 2.5–4.9)
PLATELET # BLD AUTO: 152 K/UL (ref 135–450)
PMV BLD AUTO: 8.5 FL (ref 5–10.5)
POTASSIUM SERPL-SCNC: 3.9 MMOL/L (ref 3.5–5.1)
RBC # BLD AUTO: 2.8 M/UL (ref 4–5.2)
SODIUM SERPL-SCNC: 138 MMOL/L (ref 136–145)
WBC # BLD AUTO: 7.6 K/UL (ref 4–11)

## 2025-08-12 PROCEDURE — 85014 HEMATOCRIT: CPT

## 2025-08-12 PROCEDURE — 84100 ASSAY OF PHOSPHORUS: CPT

## 2025-08-12 PROCEDURE — 85025 COMPLETE CBC W/AUTO DIFF WBC: CPT

## 2025-08-12 PROCEDURE — 6370000000 HC RX 637 (ALT 250 FOR IP): Performed by: HOSPITALIST

## 2025-08-12 PROCEDURE — 6360000002 HC RX W HCPCS: Performed by: NURSE PRACTITIONER

## 2025-08-12 PROCEDURE — 6370000000 HC RX 637 (ALT 250 FOR IP): Performed by: INTERNAL MEDICINE

## 2025-08-12 PROCEDURE — 2500000003 HC RX 250 WO HCPCS: Performed by: INTERNAL MEDICINE

## 2025-08-12 PROCEDURE — 94760 N-INVAS EAR/PLS OXIMETRY 1: CPT

## 2025-08-12 PROCEDURE — 36415 COLL VENOUS BLD VENIPUNCTURE: CPT

## 2025-08-12 PROCEDURE — 83735 ASSAY OF MAGNESIUM: CPT

## 2025-08-12 PROCEDURE — 92610 EVALUATE SWALLOWING FUNCTION: CPT

## 2025-08-12 PROCEDURE — 80048 BASIC METABOLIC PNL TOTAL CA: CPT

## 2025-08-12 PROCEDURE — 85018 HEMOGLOBIN: CPT

## 2025-08-12 PROCEDURE — 2060000000 HC ICU INTERMEDIATE R&B

## 2025-08-12 PROCEDURE — 2500000003 HC RX 250 WO HCPCS: Performed by: NURSE PRACTITIONER

## 2025-08-12 RX ORDER — HALOPERIDOL 5 MG/ML
1 INJECTION INTRAMUSCULAR EVERY 4 HOURS PRN
Status: DISCONTINUED | OUTPATIENT
Start: 2025-08-12 | End: 2025-08-15 | Stop reason: HOSPADM

## 2025-08-12 RX ORDER — METOPROLOL TARTRATE 1 MG/ML
5 INJECTION, SOLUTION INTRAVENOUS EVERY 8 HOURS
Status: DISCONTINUED | OUTPATIENT
Start: 2025-08-12 | End: 2025-08-12

## 2025-08-12 RX ADMIN — Medication 40 MG: at 19:55

## 2025-08-12 RX ADMIN — Medication 40 MG: at 12:21

## 2025-08-12 RX ADMIN — CLONIDINE HYDROCHLORIDE 0.1 MG: 0.1 TABLET ORAL at 12:21

## 2025-08-12 RX ADMIN — CLONIDINE HYDROCHLORIDE 0.1 MG: 0.1 TABLET ORAL at 16:18

## 2025-08-12 RX ADMIN — ACETAMINOPHEN 650 MG: 325 TABLET ORAL at 12:21

## 2025-08-12 RX ADMIN — SODIUM CHLORIDE, PRESERVATIVE FREE 10 ML: 5 INJECTION INTRAVENOUS at 19:58

## 2025-08-12 RX ADMIN — LAMOTRIGINE 25 MG: 25 TABLET ORAL at 19:55

## 2025-08-12 RX ADMIN — SERTRALINE 75 MG: 50 TABLET, FILM COATED ORAL at 12:21

## 2025-08-12 RX ADMIN — BACLOFEN 10 MG: 10 TABLET ORAL at 19:55

## 2025-08-12 RX ADMIN — SODIUM CHLORIDE, PRESERVATIVE FREE 10 ML: 5 INJECTION INTRAVENOUS at 14:01

## 2025-08-12 ASSESSMENT — PAIN DESCRIPTION - LOCATION: LOCATION: NECK

## 2025-08-12 ASSESSMENT — PAIN - FUNCTIONAL ASSESSMENT
PAIN_FUNCTIONAL_ASSESSMENT: 0-10
PAIN_FUNCTIONAL_ASSESSMENT: FACE, LEGS, ACTIVITY, CRY, AND CONSOLABILITY (FLACC)
PAIN_FUNCTIONAL_ASSESSMENT: ACTIVITIES ARE NOT PREVENTED

## 2025-08-12 ASSESSMENT — PAIN SCALES - WONG BAKER: WONGBAKER_NUMERICALRESPONSE: NO HURT

## 2025-08-13 LAB
ANION GAP SERPL CALCULATED.3IONS-SCNC: 9 MMOL/L (ref 3–16)
ANTI-XA UNFRAC HEPARIN: 0.74 IU/ML (ref 0.3–0.7)
BUN SERPL-MCNC: 16 MG/DL (ref 7–20)
CALCIUM SERPL-MCNC: 8.5 MG/DL (ref 8.3–10.6)
CHLORIDE SERPL-SCNC: 104 MMOL/L (ref 99–110)
CO2 SERPL-SCNC: 25 MMOL/L (ref 21–32)
CREAT SERPL-MCNC: 0.9 MG/DL (ref 0.6–1.2)
GFR SERPLBLD CREATININE-BSD FMLA CKD-EPI: 65 ML/MIN/{1.73_M2}
GLUCOSE SERPL-MCNC: 94 MG/DL (ref 70–99)
MAGNESIUM SERPL-MCNC: 2.22 MG/DL (ref 1.8–2.4)
PHOSPHATE SERPL-MCNC: 3.5 MG/DL (ref 2.5–4.9)
POTASSIUM SERPL-SCNC: 4 MMOL/L (ref 3.5–5.1)
SODIUM SERPL-SCNC: 138 MMOL/L (ref 136–145)

## 2025-08-13 PROCEDURE — 2060000000 HC ICU INTERMEDIATE R&B

## 2025-08-13 PROCEDURE — 92526 ORAL FUNCTION THERAPY: CPT

## 2025-08-13 PROCEDURE — 6370000000 HC RX 637 (ALT 250 FOR IP): Performed by: HOSPITALIST

## 2025-08-13 PROCEDURE — 83735 ASSAY OF MAGNESIUM: CPT

## 2025-08-13 PROCEDURE — 6360000002 HC RX W HCPCS: Performed by: NURSE PRACTITIONER

## 2025-08-13 PROCEDURE — 2500000003 HC RX 250 WO HCPCS: Performed by: INTERNAL MEDICINE

## 2025-08-13 PROCEDURE — 80048 BASIC METABOLIC PNL TOTAL CA: CPT

## 2025-08-13 PROCEDURE — 84100 ASSAY OF PHOSPHORUS: CPT

## 2025-08-13 PROCEDURE — 2500000003 HC RX 250 WO HCPCS: Performed by: NURSE PRACTITIONER

## 2025-08-13 PROCEDURE — 36415 COLL VENOUS BLD VENIPUNCTURE: CPT

## 2025-08-13 PROCEDURE — 94760 N-INVAS EAR/PLS OXIMETRY 1: CPT

## 2025-08-13 PROCEDURE — 85520 HEPARIN ASSAY: CPT

## 2025-08-13 RX ORDER — PANTOPRAZOLE SODIUM 40 MG/1
40 TABLET, DELAYED RELEASE ORAL 2 TIMES DAILY
Status: DISCONTINUED | OUTPATIENT
Start: 2025-08-13 | End: 2025-08-15 | Stop reason: HOSPADM

## 2025-08-13 RX ADMIN — CLONIDINE HYDROCHLORIDE 0.1 MG: 0.1 TABLET ORAL at 15:41

## 2025-08-13 RX ADMIN — Medication 40 MG: at 08:50

## 2025-08-13 RX ADMIN — SERTRALINE 75 MG: 50 TABLET, FILM COATED ORAL at 08:50

## 2025-08-13 RX ADMIN — SODIUM CHLORIDE, PRESERVATIVE FREE 10 ML: 5 INJECTION INTRAVENOUS at 08:50

## 2025-08-13 RX ADMIN — CLONIDINE HYDROCHLORIDE 0.1 MG: 0.1 TABLET ORAL at 08:50

## 2025-08-13 ASSESSMENT — PAIN SCALES - GENERAL: PAINLEVEL_OUTOF10: 0

## 2025-08-14 LAB
ANION GAP SERPL CALCULATED.3IONS-SCNC: 9 MMOL/L (ref 3–16)
ANTI-XA UNFRAC HEPARIN: >1.1 IU/ML (ref 0.3–0.7)
ANTI-XA UNFRAC HEPARIN: >1.1 IU/ML (ref 0.3–0.7)
BASOPHILS # BLD: 0.1 K/UL (ref 0–0.2)
BASOPHILS NFR BLD: 1 %
BUN SERPL-MCNC: 16 MG/DL (ref 7–20)
CALCIUM SERPL-MCNC: 8.5 MG/DL (ref 8.3–10.6)
CHLORIDE SERPL-SCNC: 103 MMOL/L (ref 99–110)
CO2 SERPL-SCNC: 27 MMOL/L (ref 21–32)
CREAT SERPL-MCNC: 1 MG/DL (ref 0.6–1.2)
DEPRECATED RDW RBC AUTO: 17.5 % (ref 12.4–15.4)
EOSINOPHIL # BLD: 0 K/UL (ref 0–0.6)
EOSINOPHIL NFR BLD: 0.5 %
GFR SERPLBLD CREATININE-BSD FMLA CKD-EPI: 57 ML/MIN/{1.73_M2}
GLUCOSE BLD-MCNC: 104 MG/DL (ref 70–99)
GLUCOSE SERPL-MCNC: 106 MG/DL (ref 70–99)
HCT VFR BLD AUTO: 24.5 % (ref 36–48)
HGB BLD-MCNC: 8.3 G/DL (ref 12–16)
LYMPHOCYTES # BLD: 1.8 K/UL (ref 1–5.1)
LYMPHOCYTES NFR BLD: 22.2 %
MAGNESIUM SERPL-MCNC: 2.11 MG/DL (ref 1.8–2.4)
MCH RBC QN AUTO: 30.2 PG (ref 26–34)
MCHC RBC AUTO-ENTMCNC: 33.6 G/DL (ref 31–36)
MCV RBC AUTO: 89.9 FL (ref 80–100)
MONOCYTES # BLD: 0.6 K/UL (ref 0–1.3)
MONOCYTES NFR BLD: 7.4 %
NEUTROPHILS # BLD: 5.5 K/UL (ref 1.7–7.7)
NEUTROPHILS NFR BLD: 68.9 %
PERFORMED ON: ABNORMAL
PHOSPHATE SERPL-MCNC: 3.5 MG/DL (ref 2.5–4.9)
PLATELET # BLD AUTO: 198 K/UL (ref 135–450)
PMV BLD AUTO: 9.1 FL (ref 5–10.5)
POTASSIUM SERPL-SCNC: 3.6 MMOL/L (ref 3.5–5.1)
RBC # BLD AUTO: 2.73 M/UL (ref 4–5.2)
REASON FOR REJECTION: NORMAL
REJECTED TEST: NORMAL
SODIUM SERPL-SCNC: 139 MMOL/L (ref 136–145)
WBC # BLD AUTO: 7.9 K/UL (ref 4–11)

## 2025-08-14 PROCEDURE — 6370000000 HC RX 637 (ALT 250 FOR IP): Performed by: HOSPITALIST

## 2025-08-14 PROCEDURE — 36415 COLL VENOUS BLD VENIPUNCTURE: CPT

## 2025-08-14 PROCEDURE — 97530 THERAPEUTIC ACTIVITIES: CPT

## 2025-08-14 PROCEDURE — 97166 OT EVAL MOD COMPLEX 45 MIN: CPT

## 2025-08-14 PROCEDURE — 84100 ASSAY OF PHOSPHORUS: CPT

## 2025-08-14 PROCEDURE — 80048 BASIC METABOLIC PNL TOTAL CA: CPT

## 2025-08-14 PROCEDURE — 6360000002 HC RX W HCPCS: Performed by: HOSPITALIST

## 2025-08-14 PROCEDURE — 85025 COMPLETE CBC W/AUTO DIFF WBC: CPT

## 2025-08-14 PROCEDURE — 94760 N-INVAS EAR/PLS OXIMETRY 1: CPT

## 2025-08-14 PROCEDURE — 97162 PT EVAL MOD COMPLEX 30 MIN: CPT

## 2025-08-14 PROCEDURE — 83735 ASSAY OF MAGNESIUM: CPT

## 2025-08-14 PROCEDURE — 85520 HEPARIN ASSAY: CPT

## 2025-08-14 PROCEDURE — 2060000000 HC ICU INTERMEDIATE R&B

## 2025-08-14 RX ORDER — PANTOPRAZOLE SODIUM 40 MG/1
40 TABLET, DELAYED RELEASE ORAL 2 TIMES DAILY
Qty: 30 TABLET | Refills: 1
Start: 2025-08-14

## 2025-08-14 RX ORDER — ASPIRIN 81 MG/1
81 TABLET, CHEWABLE ORAL DAILY
Status: DISCONTINUED | OUTPATIENT
Start: 2025-08-14 | End: 2025-08-15 | Stop reason: HOSPADM

## 2025-08-14 RX ADMIN — PANTOPRAZOLE SODIUM 40 MG: 40 TABLET, DELAYED RELEASE ORAL at 09:30

## 2025-08-14 RX ADMIN — APIXABAN 5 MG: 5 TABLET, FILM COATED ORAL at 21:52

## 2025-08-14 RX ADMIN — HEPARIN SODIUM AND DEXTROSE 1000 UNITS/HR: 10000; 5 INJECTION INTRAVENOUS at 05:42

## 2025-08-14 RX ADMIN — APIXABAN 5 MG: 5 TABLET, FILM COATED ORAL at 09:30

## 2025-08-14 RX ADMIN — LAMOTRIGINE 25 MG: 25 TABLET ORAL at 21:52

## 2025-08-14 RX ADMIN — SERTRALINE 75 MG: 50 TABLET, FILM COATED ORAL at 09:30

## 2025-08-14 RX ADMIN — PANTOPRAZOLE SODIUM 40 MG: 40 TABLET, DELAYED RELEASE ORAL at 21:52

## 2025-08-14 RX ADMIN — ASPIRIN 81 MG: 81 TABLET, CHEWABLE ORAL at 09:30

## 2025-08-14 RX ADMIN — BACLOFEN 10 MG: 10 TABLET ORAL at 21:52

## 2025-08-14 ASSESSMENT — PAIN SCALES - GENERAL
PAINLEVEL_OUTOF10: 0
PAINLEVEL_OUTOF10: 0

## 2025-08-15 VITALS
SYSTOLIC BLOOD PRESSURE: 102 MMHG | HEIGHT: 67 IN | OXYGEN SATURATION: 98 % | RESPIRATION RATE: 18 BRPM | BODY MASS INDEX: 25.29 KG/M2 | HEART RATE: 72 BPM | DIASTOLIC BLOOD PRESSURE: 59 MMHG | TEMPERATURE: 97.8 F | WEIGHT: 161.16 LBS

## 2025-08-15 PROCEDURE — 6370000000 HC RX 637 (ALT 250 FOR IP): Performed by: HOSPITALIST

## 2025-08-15 PROCEDURE — 97116 GAIT TRAINING THERAPY: CPT

## 2025-08-15 PROCEDURE — 92526 ORAL FUNCTION THERAPY: CPT

## 2025-08-15 PROCEDURE — 97535 SELF CARE MNGMENT TRAINING: CPT

## 2025-08-15 PROCEDURE — 97530 THERAPEUTIC ACTIVITIES: CPT

## 2025-08-15 PROCEDURE — 94760 N-INVAS EAR/PLS OXIMETRY 1: CPT

## 2025-08-15 PROCEDURE — 6370000000 HC RX 637 (ALT 250 FOR IP): Performed by: INTERNAL MEDICINE

## 2025-08-15 RX ADMIN — PANTOPRAZOLE SODIUM 40 MG: 40 TABLET, DELAYED RELEASE ORAL at 10:12

## 2025-08-15 RX ADMIN — APIXABAN 5 MG: 5 TABLET, FILM COATED ORAL at 10:12

## 2025-08-15 RX ADMIN — ASPIRIN 81 MG: 81 TABLET, CHEWABLE ORAL at 10:12

## 2025-08-15 RX ADMIN — SERTRALINE 75 MG: 50 TABLET, FILM COATED ORAL at 10:12

## 2025-08-15 RX ADMIN — ONDANSETRON 4 MG: 4 TABLET, ORALLY DISINTEGRATING ORAL at 10:12

## 2025-08-15 ASSESSMENT — PAIN SCALES - GENERAL
PAINLEVEL_OUTOF10: 0

## 2025-08-15 ASSESSMENT — PAIN SCALES - PAIN ASSESSMENT IN ADVANCED DEMENTIA (PAINAD): BREATHING: NORMAL

## (undated) DEVICE — SOLUTION IV IRRIG 250ML ST LF 0.9% SODIUM 2F7122

## (undated) DEVICE — NEEDLE HYPO 30GA L0.5IN BGE POLYPR HUB S STL REG BVL STR

## (undated) DEVICE — INSTRUMENT COVER: Brand: CONVERTORS

## (undated) DEVICE — Z DISCONTINUED USE 2131664 WIPE INSTR W3XL3IN NONLINTING

## (undated) DEVICE — CORD ES L12FT BPLR FRCP

## (undated) DEVICE — Device

## (undated) DEVICE — COVER LT HNDL BLU PLAS

## (undated) DEVICE — 6 ML SYRINGE LUER-LOCK TIP: Brand: MONOJECT

## (undated) DEVICE — APPLICATOR,COTTON-TIP,WOOD,3,STRL: Brand: MEDLINE

## (undated) DEVICE — INTENDED FOR TISSUE SEPARATION, AND OTHER PROCEDURES THAT REQUIRE A SHARP SURGICAL BLADE TO PUNCTURE OR CUT.: Brand: BARD-PARKER ® STAINLESS STEEL BLADES

## (undated) DEVICE — NEEDLE 25GAX5.0MM INJ CARR LOCKE

## (undated) DEVICE — SOLUTION IRRIG 250ML STRL H2O PLAS POUR BTL USP

## (undated) DEVICE — HOLDER RESTRAINT LIMB UNIV FOAM PR D RNG SINGLE STRP LF

## (undated) DEVICE — GAUZE SPONGES,12 PLY: Brand: CURITY

## (undated) DEVICE — GLOVE SURG UNDERGLOVE 6.5 PF BLU BIOGEL PI MIC LF

## (undated) DEVICE — ENT I-LF: Brand: MEDLINE INDUSTRIES, INC.

## (undated) DEVICE — BITE BLOCK ENDOSCP AD 60 FR W/ ADJ STRP PLAS GRN BLOX

## (undated) DEVICE — SUTURE PROL 7-0 L18IN NONABSORBABLE BLU P-1 L11MM 3/8 CIR 8696G